# Patient Record
Sex: FEMALE | Race: WHITE | NOT HISPANIC OR LATINO | Employment: OTHER | URBAN - METROPOLITAN AREA
[De-identification: names, ages, dates, MRNs, and addresses within clinical notes are randomized per-mention and may not be internally consistent; named-entity substitution may affect disease eponyms.]

---

## 2017-06-22 ENCOUNTER — TRANSFERRED RECORDS (OUTPATIENT)
Dept: HEALTH INFORMATION MANAGEMENT | Facility: CLINIC | Age: 67
End: 2017-06-22

## 2018-06-28 ENCOUNTER — OFFICE VISIT (OUTPATIENT)
Dept: PEDIATRICS | Facility: CLINIC | Age: 68
End: 2018-06-28
Payer: COMMERCIAL

## 2018-06-28 ENCOUNTER — RADIANT APPOINTMENT (OUTPATIENT)
Dept: MAMMOGRAPHY | Facility: CLINIC | Age: 68
End: 2018-06-28
Attending: NURSE PRACTITIONER
Payer: COMMERCIAL

## 2018-06-28 VITALS
OXYGEN SATURATION: 97 % | WEIGHT: 108 LBS | TEMPERATURE: 97.5 F | HEART RATE: 68 BPM | SYSTOLIC BLOOD PRESSURE: 180 MMHG | DIASTOLIC BLOOD PRESSURE: 80 MMHG | HEIGHT: 56 IN | BODY MASS INDEX: 24.3 KG/M2

## 2018-06-28 DIAGNOSIS — Z12.31 ENCOUNTER FOR SCREENING MAMMOGRAM FOR BREAST CANCER: ICD-10-CM

## 2018-06-28 DIAGNOSIS — I10 HYPERTENSION WITH GOAL BLOOD PRESSURE LESS THAN 140/90: Primary | ICD-10-CM

## 2018-06-28 DIAGNOSIS — M81.0 OSTEOPOROSIS WITHOUT CURRENT PATHOLOGICAL FRACTURE, UNSPECIFIED OSTEOPOROSIS TYPE: ICD-10-CM

## 2018-06-28 DIAGNOSIS — E78.5 HYPERLIPIDEMIA WITH TARGET LDL LESS THAN 130: ICD-10-CM

## 2018-06-28 DIAGNOSIS — D50.9 IRON DEFICIENCY ANEMIA, UNSPECIFIED IRON DEFICIENCY ANEMIA TYPE: ICD-10-CM

## 2018-06-28 LAB
ALBUMIN SERPL-MCNC: 3.6 G/DL (ref 3.4–5)
ALP SERPL-CCNC: 78 U/L (ref 40–150)
ALT SERPL W P-5'-P-CCNC: 28 U/L (ref 0–50)
ANION GAP SERPL CALCULATED.3IONS-SCNC: 7 MMOL/L (ref 3–14)
AST SERPL W P-5'-P-CCNC: 23 U/L (ref 0–45)
BILIRUB SERPL-MCNC: 0.4 MG/DL (ref 0.2–1.3)
BUN SERPL-MCNC: 19 MG/DL (ref 7–30)
CALCIUM SERPL-MCNC: 8.7 MG/DL (ref 8.5–10.1)
CHLORIDE SERPL-SCNC: 106 MMOL/L (ref 94–109)
CHOLEST SERPL-MCNC: 208 MG/DL
CO2 SERPL-SCNC: 28 MMOL/L (ref 20–32)
CREAT SERPL-MCNC: 0.73 MG/DL (ref 0.52–1.04)
CREAT UR-MCNC: 88 MG/DL
ERYTHROCYTE [DISTWIDTH] IN BLOOD BY AUTOMATED COUNT: 12.4 % (ref 10–15)
FERRITIN SERPL-MCNC: 78 NG/ML (ref 8–252)
GFR SERPL CREATININE-BSD FRML MDRD: 79 ML/MIN/1.7M2
GLUCOSE SERPL-MCNC: 96 MG/DL (ref 70–99)
HCT VFR BLD AUTO: 39.4 % (ref 35–47)
HDLC SERPL-MCNC: 70 MG/DL
HGB BLD-MCNC: 12.6 G/DL (ref 11.7–15.7)
LDLC SERPL CALC-MCNC: 115 MG/DL
MCH RBC QN AUTO: 29.4 PG (ref 26.5–33)
MCHC RBC AUTO-ENTMCNC: 32 G/DL (ref 31.5–36.5)
MCV RBC AUTO: 92 FL (ref 78–100)
MICROALBUMIN UR-MCNC: 13 MG/L
MICROALBUMIN/CREAT UR: 14.94 MG/G CR (ref 0–25)
NONHDLC SERPL-MCNC: 138 MG/DL
PLATELET # BLD AUTO: 217 10E9/L (ref 150–450)
POTASSIUM SERPL-SCNC: 3.7 MMOL/L (ref 3.4–5.3)
PROT SERPL-MCNC: 7.7 G/DL (ref 6.8–8.8)
RBC # BLD AUTO: 4.28 10E12/L (ref 3.8–5.2)
SODIUM SERPL-SCNC: 141 MMOL/L (ref 133–144)
T4 FREE SERPL-MCNC: 0.96 NG/DL (ref 0.76–1.46)
TRIGL SERPL-MCNC: 113 MG/DL
TSH SERPL DL<=0.005 MIU/L-ACNC: 4.67 MU/L (ref 0.4–4)
WBC # BLD AUTO: 4.8 10E9/L (ref 4–11)

## 2018-06-28 PROCEDURE — 85027 COMPLETE CBC AUTOMATED: CPT | Performed by: NURSE PRACTITIONER

## 2018-06-28 PROCEDURE — 36415 COLL VENOUS BLD VENIPUNCTURE: CPT | Performed by: NURSE PRACTITIONER

## 2018-06-28 PROCEDURE — 82728 ASSAY OF FERRITIN: CPT | Performed by: NURSE PRACTITIONER

## 2018-06-28 PROCEDURE — 82043 UR ALBUMIN QUANTITATIVE: CPT | Performed by: NURSE PRACTITIONER

## 2018-06-28 PROCEDURE — 99204 OFFICE O/P NEW MOD 45 MIN: CPT | Performed by: NURSE PRACTITIONER

## 2018-06-28 PROCEDURE — 80061 LIPID PANEL: CPT | Performed by: NURSE PRACTITIONER

## 2018-06-28 PROCEDURE — 80053 COMPREHEN METABOLIC PANEL: CPT | Performed by: NURSE PRACTITIONER

## 2018-06-28 PROCEDURE — 84439 ASSAY OF FREE THYROXINE: CPT | Performed by: NURSE PRACTITIONER

## 2018-06-28 PROCEDURE — 77067 SCR MAMMO BI INCL CAD: CPT | Performed by: STUDENT IN AN ORGANIZED HEALTH CARE EDUCATION/TRAINING PROGRAM

## 2018-06-28 PROCEDURE — 84443 ASSAY THYROID STIM HORMONE: CPT | Performed by: NURSE PRACTITIONER

## 2018-06-28 RX ORDER — MOMETASONE FUROATE 1 MG/ML
SOLUTION TOPICAL
COMMUNITY
Start: 2015-06-02 | End: 2022-04-21

## 2018-06-28 RX ORDER — LISINOPRIL 10 MG/1
10 TABLET ORAL DAILY
Qty: 30 TABLET | Refills: 1 | Status: SHIPPED | OUTPATIENT
Start: 2018-06-28 | End: 2018-07-06

## 2018-06-28 RX ORDER — HYDROCHLOROTHIAZIDE 12.5 MG/1
CAPSULE ORAL
COMMUNITY
Start: 2007-09-28 | End: 2018-06-28

## 2018-06-28 RX ORDER — LYSINE HCL 500 MG
0.5 TABLET ORAL
COMMUNITY
Start: 2018-04-10 | End: 2018-11-09 | Stop reason: ALTCHOICE

## 2018-06-28 RX ORDER — LOVASTATIN 20 MG
20 TABLET ORAL AT BEDTIME
Qty: 90 TABLET | Refills: 1 | Status: SHIPPED | OUTPATIENT
Start: 2018-06-28 | End: 2018-07-09

## 2018-06-28 RX ORDER — LISINOPRIL 10 MG/1
10 TABLET ORAL DAILY
Qty: 30 TABLET | Refills: 1 | Status: SHIPPED | OUTPATIENT
Start: 2018-06-28 | End: 2018-06-28

## 2018-06-28 RX ORDER — LOVASTATIN 20 MG
20 TABLET ORAL
COMMUNITY
Start: 2018-03-29 | End: 2018-06-28

## 2018-06-28 NOTE — PROGRESS NOTES
SUBJECTIVE:   Bree Carrillo is a 67 year old female who presents to clinic today for the following health issues:    New Patient/Transfer of Care    Hyperlipidemia Follow-Up      Rate your low fat/cholesterol diet?: not monitoring fat    Taking statin?  Yes, no muscle aches from statin    Other lipid medications/supplements?:  none    Hypertension Follow-up      Outpatient blood pressures are not being checked. Patient has been off on BP medications for the last 2-3 years.    Low Salt Diet: low salt  Was on medications several years ago but has been off for several years       Amount of exercise or physical activity: house work    Problems taking medications regularly: No    Medication side effects: none    Diet: regular (no restrictions)      Problem list and histories reviewed & adjusted, as indicated.  Additional history: as documented    Patient Active Problem List   Diagnosis     Anemia     Closed right cuboid fracture     Falls     GERD (gastroesophageal reflux disease)     GI bleed     Hyperlipidemia     Hypertension     Iron deficiency anemia     Osteoporosis     Right foot pain     Vitamin D deficiency     No past surgical history on file.    Social History   Substance Use Topics     Smoking status: Never Smoker     Smokeless tobacco: Never Used     Alcohol use No     Family History   Problem Relation Age of Onset     No Known Problems Mother      No Known Problems Father      No Known Problems Maternal Grandmother      No Known Problems Maternal Grandfather      No Known Problems Paternal Grandmother      No Known Problems Paternal Grandfather      Coronary Artery Disease Brother      No Known Problems Sister      No Known Problems Son      No Known Problems Daughter      No Known Problems Maternal Half-Brother      No Known Problems Maternal Half-Sister      No Known Problems Paternal Half-Brother      No Known Problems Paternal Half-Sister      No Known Problems Niece      No Known Problems Nephew       No Known Problems Cousin      No Known Problems Other      Diabetes No family hx of      Hypertension No family hx of      Hyperlipidemia No family hx of      Cerebrovascular Disease No family hx of      Breast Cancer No family hx of      Colon Cancer No family hx of      Prostate Cancer No family hx of      Other Cancer No family hx of      Depression No family hx of      Anxiety Disorder No family hx of      Mental Illness No family hx of      Substance Abuse No family hx of      Anesthesia Reaction No family hx of      Asthma No family hx of      Osteoperosis No family hx of      Genetic Disorder No family hx of      Thyroid Disease No family hx of      Obesity No family hx of      Unknown/Adopted No family hx of          Current Outpatient Prescriptions   Medication Sig Dispense Refill     Calcium Carbonate-Vit D-Min (CALCIUM 600+D PLUS MINERALS) 600-400 MG-UNIT TABS Take 0.5 tablets by mouth       lovastatin (MEVACOR) 20 MG tablet Take 20 mg by mouth       denosumab (PROLIA) 60 MG/ML SOLN injection Inject 60 mg Subcutaneous       hydrochlorothiazide (MICROZIDE) 12.5 MG capsule        mometasone (ELOCON) 0.1 % solution (lotion)        Allergies   Allergen Reactions     Iohexol Hives     Pt experienced sneeze, itching followed by hives post 100cc Omni 350 for CT Scan.   Observed with IV in place x 45 minutes.   Given 25mg PO Benadryl with OK for 50mg to be taken every 8 hours for the next 24 hours per Dr Rausch.     JRS     BP Readings from Last 3 Encounters:   06/28/18 180/80    Wt Readings from Last 3 Encounters:   06/28/18 108 lb (49 kg)                  Labs reviewed in EPIC    Reviewed and updated as needed this visit by clinical staff  Tobacco  Allergies  Meds  Problems       Reviewed and updated as needed this visit by Provider  Problems         ROS:  CONSTITUTIONAL:NEGATIVE for fever, chills, change in weight  ENT/MOUTH: NEGATIVE for ear, mouth and throat problems  RESP:NEGATIVE for significant  "cough or SOB  CV: NEGATIVE for chest pain, palpitations or peripheral edema  GI: NEGATIVE for nausea, abdominal pain, heartburn, or change in bowel habits  MUSCULOSKELETAL: NEGATIVE for significant arthralgias or myalgia  NEURO: NEGATIVE for weakness, dizziness or paresthesias  ENDOCRINE: NEGATIVE for temperature intolerance, skin/hair changes  HEME/ALLERGY/IMMUNE: NEGATIVE for bleeding problems    OBJECTIVE:     /80 (BP Location: Right arm, Patient Position: Sitting, Cuff Size: Adult Regular)  Pulse 68  Temp 97.5  F (36.4  C) (Temporal)  Ht 4' 7.5\" (1.41 m)  Wt 108 lb (49 kg)  SpO2 97%  Breastfeeding? No  BMI 24.65 kg/m2  Body mass index is 24.65 kg/(m^2).   Wt Readings from Last 4 Encounters:   06/28/18 108 lb (49 kg)       GENERAL: Patient is well nourished, well developed, thin, frail,in no apparent distress, non-toxic, in no respiratory distress and acyanotic, alert, cooperative and well hydrated  EYES: Eyes grossly normal to inspection and conjunctivae and sclerae normal  HENT:ear canals and TM's normal and nose and mouth without ulcers or lesions   NECK:normal, supple and no adenopathy  CARDIAC:regular rates and rhythm, no murmur, click or rub and no irregular beats  without LE edema bilaterally  RESP: normal respiratory rate and rhythm, lungs clear to auscultation  unlabored respirations, no intercostal retractions or accessory muscle use  ABD:soft, nontender  SKIN: Skin color, texture, turgor normal. No rashes or lesions.  MS: extremities normal- no gross deformities noted, gait normal and normal muscle tone  NEURO: mentation intact and speech normal  PSYCH: Alert, oriented, thought content appropriate,mentation appears normal., affect and mood normal      Diagnostic Test Results:  Results for orders placed or performed in visit on 06/28/18 (from the past 24 hour(s))   Lipid panel reflex to direct LDL Fasting   Result Value Ref Range    Cholesterol 208 (H) <200 mg/dL    Triglycerides 113 <150 " mg/dL    HDL Cholesterol 70 >49 mg/dL    LDL Cholesterol Calculated 115 (H) <100 mg/dL    Non HDL Cholesterol 138 (H) <130 mg/dL   Comprehensive metabolic panel   Result Value Ref Range    Sodium 141 133 - 144 mmol/L    Potassium 3.7 3.4 - 5.3 mmol/L    Chloride 106 94 - 109 mmol/L    Carbon Dioxide 28 20 - 32 mmol/L    Anion Gap 7 3 - 14 mmol/L    Glucose 96 70 - 99 mg/dL    Urea Nitrogen 19 7 - 30 mg/dL    Creatinine 0.73 0.52 - 1.04 mg/dL    GFR Estimate 79 >60 mL/min/1.7m2    GFR Estimate If Black >90 >60 mL/min/1.7m2    Calcium 8.7 8.5 - 10.1 mg/dL    Bilirubin Total 0.4 0.2 - 1.3 mg/dL    Albumin 3.6 3.4 - 5.0 g/dL    Protein Total 7.7 6.8 - 8.8 g/dL    Alkaline Phosphatase 78 40 - 150 U/L    ALT 28 0 - 50 U/L    AST 23 0 - 45 U/L   TSH with free T4 reflex   Result Value Ref Range    TSH 4.67 (H) 0.40 - 4.00 mU/L   CBC with platelets   Result Value Ref Range    WBC 4.8 4.0 - 11.0 10e9/L    RBC Count 4.28 3.8 - 5.2 10e12/L    Hemoglobin 12.6 11.7 - 15.7 g/dL    Hematocrit 39.4 35.0 - 47.0 %    MCV 92 78 - 100 fl    MCH 29.4 26.5 - 33.0 pg    MCHC 32.0 31.5 - 36.5 g/dL    RDW 12.4 10.0 - 15.0 %    Platelet Count 217 150 - 450 10e9/L   Ferritin   Result Value Ref Range    Ferritin 78 8 - 252 ng/mL   T4 free   Result Value Ref Range    T4 Free 0.96 0.76 - 1.46 ng/dL   Albumin Random Urine Quantitative with Creat Ratio   Result Value Ref Range    Creatinine Urine 88 mg/dL    Albumin Urine mg/L 13 mg/L    Albumin Urine mg/g Cr 14.94 0 - 25 mg/g Cr       ASSESSMENT/PLAN:     Bree was seen today for establish care and lipids.    Diagnoses and all orders for this visit:    Hypertension with goal blood pressure less than 140/90  -     JUST IN CASE  -     Comprehensive metabolic panel  -     Albumin Random Urine Quantitative with Creat Ratio  -     Discontinue: lisinopril (PRINIVIL/ZESTRIL) 10 MG tablet; Take 1 tablet (10 mg) by mouth daily  -     lisinopril (PRINIVIL/ZESTRIL) 10 MG tablet; Take 1 tablet (10 mg) by  mouth daily  -     T4 free  HTN Plan:  1)  Medication: begin: lisinopril   2)  Dietary sodium restriction  3)  Regular aerobic exercise  4)  Recheck in 1 week, sooner should new symptoms or   problems arise.  5) See todays orders.    Patient Education: Reviewed risks of hypertension and principles of   treatment.      Hyperlipidemia with target LDL less than 130  -     lovastatin (MEVACOR) 20 MG tablet; Take 1 tablet (20 mg) by mouth At Bedtime  -     Lipid panel reflex to direct LDL Fasting  -     JUST IN CASE  -     TSH with free T4 reflex  Hyperlipidemia Plan:  Regular exercise and a low fat diet are encouraged.  A fasting lipid profile is obtained today.  Side effects of cholesterol medications discussed.  Patient is to follow-up in 12 months for a fasting lipid profile.    Iron deficiency anemia, unspecified iron deficiency anemia type  -     CBC with platelets  -     Ferritin  Will follow up and/or notify patient on results via phone or mail to determine further need for followup      Encounter for screening mammogram for breast cancer  -     MA Screening Digital Bilateral; Future    Osteoporosis without current pathological fracture, unspecified osteoporosis type  -     ENDOCRINOLOGY ADULT REFERRAL      PLAN:    Patient needs to follow up in if no improvement,or sooner if worsening of symptoms or other symptoms develop.  FURTHER TESTING:       - mammogram  CONSULTATION/REFERRAL to Endocrinology  Will follow up and/or notify patient on results via phone or mail to determine further need for followup  Schedule physical exam   Blood pressure check only in 1 week.   See Patient Instructions    AYANNA Bowser CNP  [facility]

## 2018-06-28 NOTE — NURSING NOTE
"Chief Complaint   Patient presents with     Establish Care     Lipids     fasting today       Initial /80 (BP Location: Right arm, Patient Position: Sitting, Cuff Size: Adult Regular)  Pulse 68  Temp 97.5  F (36.4  C) (Temporal)  Ht 4' 7.5\" (1.41 m)  Wt 108 lb (49 kg)  SpO2 97%  Breastfeeding? No  BMI 24.65 kg/m2 Estimated body mass index is 24.65 kg/(m^2) as calculated from the following:    Height as of this encounter: 4' 7.5\" (1.41 m).    Weight as of this encounter: 108 lb (49 kg).  Medication Reconciliation: complete      TG Kang      "

## 2018-06-28 NOTE — PATIENT INSTRUCTIONS
PLAN:   1.   Symptomatic therapy suggested: will start on lisinopril once a day.  2.  Orders Placed This Encounter   Medications     mometasone (ELOCON) 0.1 % solution (lotion)     denosumab (PROLIA) 60 MG/ML SOLN injection     Sig: Inject 60 mg Subcutaneous     DISCONTD: lovastatin (MEVACOR) 20 MG tablet     Sig: Take 20 mg by mouth     Calcium Carbonate-Vit D-Min (CALCIUM 600+D PLUS MINERALS) 600-400 MG-UNIT TABS     Sig: Take 0.5 tablets by mouth     DISCONTD: hydrochlorothiazide (MICROZIDE) 12.5 MG capsule     lovastatin (MEVACOR) 20 MG tablet     Sig: Take 1 tablet (20 mg) by mouth At Bedtime     Dispense:  90 tablet     Refill:  1     DISCONTD: lisinopril (PRINIVIL/ZESTRIL) 10 MG tablet     Sig: Take 1 tablet (10 mg) by mouth daily     Dispense:  30 tablet     Refill:  1     lisinopril (PRINIVIL/ZESTRIL) 10 MG tablet     Sig: Take 1 tablet (10 mg) by mouth daily     Dispense:  30 tablet     Refill:  1     Orders Placed This Encounter   Procedures     MA Screening Digital Bilateral     Lipid panel reflex to direct LDL Fasting     JUST IN CASE     Comprehensive metabolic panel     TSH with free T4 reflex     Albumin Random Urine Quantitative with Creat Ratio     CBC with platelets     Ferritin     ENDOCRINOLOGY ADULT REFERRAL       3. Patient needs to follow up in if no improvement,or sooner if worsening of symptoms or other symptoms develop.  FURTHER TESTING:       - mammogram  CONSULTATION/REFERRAL to Endocrinology  Will follow up and/or notify patient on results via phone or mail to determine further need for followup  Schedule physical exam   Blood pressure check only in 1 week.     It was a pleasure seeing you today at the [facility]. Thank you for allowing us to care for you today. We truly hope we provided you with the excellent service you deserve. Please let us know if there is anything else we can do for you so we can be sure you are leaving completley satisfied with  your care experience.       General information about your clinic   Clinic Hours Lab Hours (Appointments are required)   Mon-Thurs: 7:30 AM - 7 PM Mon-Thurs: 7:30 AM - 7 PM   Fri: 7:30 AM - 5 PM Fri: 7:30 AM - 5 PM        After Hours Nurse Advise & Appts:  [facility]: [phone]  [facility] On Call: to make appointments anytime: [phone] On Call Physician: call [phone] and answering service will page the on call physician.        For urgent appointments, please call [phone] and ask for the triage nurse or your care team clinic nurse.  How to contact my care team:  MyChart: [email or web address]   Phone: [phone]   Fax: [phone]       [facility]:   Phone: [phone]  Hours: 8:00 AM - 6:00 PM  Medication Refills:  Call your pharmacy and they will forward the refill to us. Please allow 3 business days for your refills to be completed.       Normal or non-critical lab and imaging results will be communicated to you by MyChart, letter or phone within 7 days.  If you do not hear from us within 10 days, please call the clinic. If you have a critical or abnormal lab result, we will notify you by phone as soon as possible.       We now have PWIC (Pediatric Walk in Care)  Monday-Friday from 7:30-4. Simply walk in and be seen for your urgent needs like cough, fever, rash, diarrhea or vomiting, pink eye, UTI. No appointments needed. Ask one of the team for more information      -Your Care Team:    Dr. Teresa Davis - Internal Medicine/Pediatrics   Dr. Tobias Carlos - Family Medicine  Dr. Minna Finley - Pediatrics  Dr. Paula Parada - Pediatrics  Reina Corley CNP - Family Practice Nurse Practitioner

## 2018-06-28 NOTE — MR AVS SNAPSHOT
After Visit Summary   6/28/2018    Bree Carrillo    MRN: [id number]           Patient Information     Date Of Birth          1950        Visit Information        Provider Department      6/28/2018 7:50 AM Reina Corley APRN CNP [facility]        Today's Diagnoses     Hypertension with goal blood pressure less than 140/90    -  1    Hyperlipidemia with target LDL less than 130        Iron deficiency anemia, unspecified iron deficiency anemia type        Encounter for screening mammogram for breast cancer        Osteoporosis without current pathological fracture, unspecified osteoporosis type          Care Instructions    PLAN:   1.   Symptomatic therapy suggested: will start on lisinopril once a day.  2.  Orders Placed This Encounter   Medications     mometasone (ELOCON) 0.1 % solution (lotion)     denosumab (PROLIA) 60 MG/ML SOLN injection     Sig: Inject 60 mg Subcutaneous     DISCONTD: lovastatin (MEVACOR) 20 MG tablet     Sig: Take 20 mg by mouth     Calcium Carbonate-Vit D-Min (CALCIUM 600+D PLUS MINERALS) 600-400 MG-UNIT TABS     Sig: Take 0.5 tablets by mouth     DISCONTD: hydrochlorothiazide (MICROZIDE) 12.5 MG capsule     lovastatin (MEVACOR) 20 MG tablet     Sig: Take 1 tablet (20 mg) by mouth At Bedtime     Dispense:  90 tablet     Refill:  1     DISCONTD: lisinopril (PRINIVIL/ZESTRIL) 10 MG tablet     Sig: Take 1 tablet (10 mg) by mouth daily     Dispense:  30 tablet     Refill:  1     lisinopril (PRINIVIL/ZESTRIL) 10 MG tablet     Sig: Take 1 tablet (10 mg) by mouth daily     Dispense:  30 tablet     Refill:  1     Orders Placed This Encounter   Procedures     MA Screening Digital Bilateral     Lipid panel reflex to direct LDL Fasting     JUST IN CASE     Comprehensive metabolic panel     TSH with free T4 reflex     Albumin Random Urine Quantitative with Creat Ratio     CBC with platelets     Ferritin     ENDOCRINOLOGY ADULT REFERRAL       3. Patient needs to  follow up in if no improvement,or sooner if worsening of symptoms or other symptoms develop.  FURTHER TESTING:       - mammogram  CONSULTATION/REFERRAL to Endocrinology  Will follow up and/or notify patient on results via phone or mail to determine further need for followup  Schedule physical exam   Blood pressure check only in 1 week.     It was a pleasure seeing you today at the [facility]. Thank you for allowing us to care for you today. We truly hope we provided you with the excellent service you deserve. Please let us know if there is anything else we can do for you so we can be sure you are leaving completley satisfied with your care experience.       General information about your clinic   Clinic Hours Lab Hours (Appointments are required)   Mon-Thurs: 7:30 AM - 7 PM Mon-Thurs: 7:30 AM - 7 PM   Fri: 7:30 AM - 5 PM Fri: 7:30 AM - 5 PM        After Hours Nurse Advise & Appts:  [facility] Advisors: [phone]  [facility] On Call: to make appointments anytime: [phone] On Call Physician: call [phone] and answering service will page the on call physician.        For urgent appointments, please call [phone] and ask for the triage nurse or your care team clinic nurse.  How to contact my care team:  MyChart: [email or web address]   Phone: [phone]   Fax: [phone]       [facility]:   Phone: [phone]  Hours: 8:00 AM - 6:00 PM  Medication Refills:  Call your pharmacy and they will forward the refill to us. Please allow 3 business days for your refills to be completed.       Normal or non-critical lab and imaging results will be communicated to you by MyChart, letter or phone within 7 days.  If you do not hear from us within 10 days, please call the clinic. If you have a critical or abnormal lab result, we will notify you by phone as soon as possible.       We now have PWIC (Pediatric Walk in Care)  Monday-Friday from 7:30-4. Simply walk in and be  seen for your urgent needs like cough, fever, rash, diarrhea or vomiting, pink eye, UTI. No appointments needed. Ask one of the team for more information      -Your Care Team:    Dr. Teresa Davis - Internal Medicine/Pediatrics   Dr. Tobias Carlos - Family Medicine  Dr. Minna Finley - Pediatrics  Dr. Paula Parada - Pediatrics  Reina Corley CNP - Family Practice Nurse Practitioner                         Follow-ups after your visit        Additional Services     ENDOCRINOLOGY ADULT REFERRAL       Your provider has referred you to: [facility]: [facility] [phone]   [email or web address]      Please be aware that coverage of these services is subject to the terms and limitations of your health insurance plan.  Call member services at your health plan with any benefit or coverage questions.      Please bring the following to your appointment:    >>   Any x-rays, CTs or MRIs which have been performed.  Contact the facility where they were done to arrange for  prior to your scheduled appointment.    >>   List of current medications   >>   This referral request   >>   Any documents/labs given to you for this referral                  Future tests that were ordered for you today     Open Future Orders        Priority Expected Expires Ordered    MA Screening Digital Bilateral Routine  6/28/2019 6/28/2018            Who to contact     If you have questions or need follow up information about today's clinic visit or your schedule please contact [facility] directly at [phone].  Normal or non-critical lab and imaging results will be communicated to you by MyChart, letter or phone within 4 business days after the clinic has received the results. If you do not hear from us within 7 days, please contact the clinic through MyChart or phone. If you have a critical or abnormal lab result, we will notify you by phone as soon  "as possible.  Submit refill requests through [employer] or call your pharmacy and they will forward the refill request to us. Please allow 3 business days for your refill to be completed.          Additional Information About Your Visit        Undo Software Information     [employer] is an electronic gateway that provides easy, online access to your medical records. With [employer], you can request a clinic appointment, read your test results, renew a prescription or communicate with your care team.     To sign up for [employer] visit the website at [email or web address] ([email or web address])/[email or web address] Like   You will be asked to enter the access code listed below, as well as some personal information. Please follow the directions to create your username and password.     Your access code is: [email or web address]  Expires: 2018  8:36 AM     Your access code will  in 90 days. If you need help or a new code, please contact your [facility] or call [phone] for assistance.        Care EveryWhere ID     This is your Care EveryWhere ID. This could be used by other organizations to access your [place] medical records  [id number]        Your Vitals Were     Pulse Temperature Height Pulse Oximetry Breastfeeding? BMI (Body Mass Index)    68 97.5  F (36.4  C) (Temporal) 4' 7.5\" (1.41 m) 97% No 24.65 kg/m2       Blood Pressure from Last 3 Encounters:   18 180/80    Weight from Last 3 Encounters:   18 108 lb (49 kg)              We Performed the Following     Albumin Random Urine Quantitative with Creat Ratio     CBC with platelets     Comprehensive metabolic panel     ENDOCRINOLOGY ADULT REFERRAL     Ferritin     JUST IN CASE     Lipid panel reflex to direct LDL Fasting     TSH with free T4 reflex          Today's Medication Changes          These changes are accurate as of 18  8:36 AM.  If you have any questions, ask your nurse or doctor.               Start taking these medicines.        Dose/Directions    lisinopril " 10 MG tablet   Commonly known as:  PRINIVIL/ZESTRIL   Used for:  Hypertension with goal blood pressure less than 140/90   Started by:  Reina Corley APRN CNP        Dose:  10 mg   Take 1 tablet (10 mg) by mouth daily   Quantity:  30 tablet   Refills:  1         These medicines have changed or have updated prescriptions.        Dose/Directions    lovastatin 20 MG tablet   Commonly known as:  MEVACOR   This may have changed:  when to take this   Used for:  Hyperlipidemia with target LDL less than 130   Changed by:  Reina Corley APRN CNP        Dose:  20 mg   Take 1 tablet (20 mg) by mouth At Bedtime   Quantity:  90 tablet   Refills:  1            Where to get your medicines      These medications were sent to [id number] - [place], [place] - [address]  [address], [facility] [address]     Phone:  [phone]     lisinopril 10 MG tablet         These medications were sent to [facility] - [facility] [address]  [address], [facility] [address]     Phone:  [phone]     lovastatin 20 MG tablet                Primary Care Provider Office Phone # Fax #    [facility] [phone]       [address]  [facility] [address]        Equal Access to Services     TAMIKO CHUNG AH: Hadii aad ku hadasho Soomaali, waaxda luqadaha, qaybta kaalmada adeegyada, waxay idiin hayaan josué khsherman strickland. So [facility] [phone].    ATENCIÓN: Si habla español, tiene a juarez disposición servicios gratuitos de asistencia lingüística. Llame al [phone].    We comply with applicable federal civil rights laws and [place] laws. We do not discriminate on the basis of race, color, national origin, age, disability, sex, sexual orientation, or gender identity.            Thank you!     Thank you for choosing [facility]  for your care. Our goal is always to provide you with excellent care. Hearing  back from our patients is one way we can continue to improve our services. Please take a few minutes to complete the written survey that you may receive in the mail after your visit with us. Thank you!             Your Updated Medication List - Protect others around you: Learn how to safely use, store and throw away your medicines at www.disposemymeds.org.          This list is accurate as of 6/28/18  8:36 AM.  Always use your most recent med list.                   Brand Name Dispense Instructions for use Diagnosis    CALCIUM 600+D PLUS MINERALS 600-400 MG-UNIT Tabs      Take 0.5 tablets by mouth        denosumab 60 MG/ML Soln injection    PROLIA     Inject 60 mg Subcutaneous        lisinopril 10 MG tablet    PRINIVIL/ZESTRIL    30 tablet    Take 1 tablet (10 mg) by mouth daily    Hypertension with goal blood pressure less than 140/90       lovastatin 20 MG tablet    MEVACOR    90 tablet    Take 1 tablet (20 mg) by mouth At Bedtime    Hyperlipidemia with target LDL less than 130       mometasone 0.1 % solution (lotion)    ELOCON

## 2018-06-29 ENCOUNTER — TELEPHONE (OUTPATIENT)
Dept: PEDIATRICS | Facility: CLINIC | Age: 68
End: 2018-06-29

## 2018-06-29 NOTE — TELEPHONE ENCOUNTER
Notes Recorded by Reina Corley, AYANNA CNP on 6/28/2018 at 11:52 PM  Please call   Labs are all in good range except thyroid slightly off will recheck at physical   Continue current medications.  Reina Corley, NP, APRN CNP    Ref Range & Units 1d ago        Creatinine Urine mg/dL 88     Albumin Urine mg/L mg/L 13     Albumin Urine mg/g Cr 0 - 25 mg/g Cr 14.94     Resulting Agency  [facility]          Specimen Collected: 06/28/18  9:34 AM Last Resulted: 06/28/18 10:04 AM         Ref Range & Units 1d ago        T4 Free 0.76 - 1.46 ng/dL 0.96     Resulting Agency  [facility]          Specimen Collected: 06/28/18  9:28 AM Last Resulted: 06/28/18 10:14 AM         Ref Range & Units 1d ago        TSH 0.40 - 4.00 mU/L 4.67 (H)     Resulting Agency  [facility]          Specimen Collected: 06/28/18  9:28 AM Last Resulted: 06/28/18 10:01 AM         Ref Range & Units 1d ago        Ferritin 8 - 252 ng/mL 78     Resulting Agency  [facility]          Specimen Collected: 06/28/18  9:28 AM Last Resulted: 06/28/18  9:56 AM         Ref Range & Units 1d ago        Cholesterol <200 mg/dL 208 (H)     Comments: Desirable:       <200 mg/dl     Triglycerides <150 mg/dL 113     Comments: Fasting specimen     HDL Cholesterol >49 mg/dL 70     LDL Cholesterol Calculated <100 mg/dL 115 (H)     Comments: Above desirable:  100-129 mg/dl   Borderline High:  130-159 mg/dL   High:             160-189 mg/dL   Very high:       >189 mg/dl        Non HDL Cholesterol <130 mg/dL 138 (H)     Comments: Above Desirable:  130-159 mg/dl   Borderline high:  160-189 mg/dl   High:             190-219 mg/dl   Very high:       >219 mg/dl        Resulting Agency  [facility]          Specimen Collected: 06/28/18  9:28 AM Last Resulted: 06/28/18  9:55 AM         Ref Range & Units 1d ago        Sodium 133 - 144 mmol/L 141     Potassium 3.4 - 5.3 mmol/L 3.7     Chloride 94 - 109 mmol/L 106     Carbon Dioxide 20 - 32 mmol/L 28     Anion Gap 3 - 14 mmol/L 7     Glucose 70 - 99 mg/dL 96      Comments: Fasting specimen     Urea Nitrogen 7 - 30 mg/dL 19     Creatinine 0.52 - 1.04 mg/dL 0.73     GFR Estimate >60 mL/min/1.7m2 79     Comments: Non  GFR Calc     GFR Estimate If Black >60 mL/min/1.7m2 >90     Comments:  GFR Calc     Calcium 8.5 - 10.1 mg/dL 8.7     Bilirubin Total 0.2 - 1.3 mg/dL 0.4     Albumin 3.4 - 5.0 g/dL 3.6     Protein Total 6.8 - 8.8 g/dL 7.7     Alkaline Phosphatase 40 - 150 U/L 78     ALT 0 - 50 U/L 28     AST 0 - 45 U/L 23     Resulting Agency  [facility]          Specimen Collected: 06/28/18  9:28 AM Last Resulted: 06/28/18  9:55 AM         Ref Range & Units 1d ago        WBC 4.0 - 11.0 10e9/L 4.8     RBC Count 3.8 - 5.2 10e12/L 4.28     Hemoglobin 11.7 - 15.7 g/dL 12.6     Hematocrit 35.0 - 47.0 % 39.4     MCV 78 - 100 fl 92     MCH 26.5 - 33.0 pg 29.4     MCHC 31.5 - 36.5 g/dL 32.0     RDW 10.0 - 15.0 % 12.4     Platelet Count 150 - 450 10e9/L 217     Resulting Agency  [facility]          Specimen Collected: 06/28/18  9:28 AM Last Resulted: 06/28/18  9:37 AM

## 2018-06-29 NOTE — TELEPHONE ENCOUNTER
Notified patient of lab result note below. Patient stated understanding and has scheduled annual physical with Reina Corley CNP on 07/23/18.     Appointment notes for 07/23/18 appointment have been updated to recheck TSH at that time.  Shadia Lunsford CMA

## 2018-07-02 ENCOUNTER — TELEPHONE (OUTPATIENT)
Dept: PEDIATRICS | Facility: CLINIC | Age: 68
End: 2018-07-02

## 2018-07-02 NOTE — TELEPHONE ENCOUNTER
[facility]    Phone Message    May a detailed message be left on voicemail: yes    Reason for Call: Patient called wanting to know the results of her Mammogram she had done on 6/28/18. Patient requesting a call back to go over those results. Thank you    Action Taken: Message routed to:  Primary Care p [phone]

## 2018-07-03 ENCOUNTER — TELEPHONE (OUTPATIENT)
Dept: PEDIATRICS | Facility: CLINIC | Age: 68
End: 2018-07-03

## 2018-07-03 DIAGNOSIS — I10 HYPERTENSION WITH GOAL BLOOD PRESSURE LESS THAN 140/90: Primary | ICD-10-CM

## 2018-07-03 NOTE — TELEPHONE ENCOUNTER
Received fax from pharmacy in regards to new rx for lisinopril 10 mg tab. Patient reports lisinopril tab 10 mg tab allergy and there is a potential for cross-sensitivity. Please confirm if okay to fill for this medication? Yes or no? Please advise    TG Kang

## 2018-07-03 NOTE — TELEPHONE ENCOUNTER
Recent Results (from the past 744 hour(s))   MA Screening Digital Bilateral    Narrative    Examination: Bilateral digital screening mammography with computer  aided detection.    Comparison: 6/22/2017, 6/22/2016, 6/17/2015    History/Family history: No symptoms, routine screening.    BREAST DENSITY: Heterogeneously dense.    COMMENTS: No significant change.      Impression    IMPRESSION: BI-RADS CATEGORY: 1 -  NEGATIVE.    RECOMMENDED FOLLOW-UP: Annual Mammography    Results to be sent to the patient.     LORIE LACEY MD     Mammogram is normal

## 2018-07-04 NOTE — TELEPHONE ENCOUNTER
Can we call and validate this and what type of issue she had with lisinopril as she said nothing of it at the visit   If just a cough we could use losartan

## 2018-07-05 ENCOUNTER — ALLIED HEALTH/NURSE VISIT (OUTPATIENT)
Dept: PEDIATRICS | Facility: CLINIC | Age: 68
End: 2018-07-05
Payer: COMMERCIAL

## 2018-07-05 VITALS — DIASTOLIC BLOOD PRESSURE: 72 MMHG | OXYGEN SATURATION: 97 % | HEART RATE: 65 BPM | SYSTOLIC BLOOD PRESSURE: 148 MMHG

## 2018-07-05 DIAGNOSIS — I10 HYPERTENSION WITH GOAL BLOOD PRESSURE LESS THAN 140/90: Primary | ICD-10-CM

## 2018-07-05 PROCEDURE — 99207 ZZC NO CHARGE NURSE ONLY: CPT

## 2018-07-05 NOTE — PROGRESS NOTES
Subjective:   Bree Carrillo is a 67 year old female with hypertension.    Current Outpatient Prescriptions   Medication Sig Dispense Refill     Calcium Carbonate-Vit D-Min (CALCIUM 600+D PLUS MINERALS) 600-400 MG-UNIT TABS Take 0.5 tablets by mouth       denosumab (PROLIA) 60 MG/ML SOLN injection Inject 60 mg Subcutaneous       lisinopril (PRINIVIL/ZESTRIL) 10 MG tablet Take 1 tablet (10 mg) by mouth daily 30 tablet 1     lovastatin (MEVACOR) 20 MG tablet Take 1 tablet (20 mg) by mouth At Bedtime 90 tablet 1     mometasone (ELOCON) 0.1 % solution (lotion)           Hypertension ROS: taking medications as instructed (patient states she takes  her lisinopril 10 mg every morning as directed) , no medication side effects noted, patient does not perform home BP monitoring,  Patient does have a home BP monitor but she states she does not know how to use it so she will bring it in to her next appointment and I advised her we will help show her how to use it and compare BP readings.  Patient stated understanding.  No TIA's, no chest pain on exertion, no dyspnea on exertion, no swelling of ankles.     New concerns: No.       /72 (BP Location: Right arm, Patient Position: Sitting, Cuff Size: Adult Regular)  Pulse 65  SpO2 97%     Misty Rausch CMA    Message routed to Senait Corley/provider nilo

## 2018-07-05 NOTE — MR AVS SNAPSHOT
After Visit Summary   7/5/2018    Bree Carrillo    MRN: [id number]           Patient Information     Date Of Birth          1950        Visit Information        Provider Department      7/5/2018 11:00 AM [facility]        Today's Diagnoses     Hypertension with goal blood pressure less than 140/90    -  1       Follow-ups after your visit        Your next 10 appointments already scheduled     Jul 23, 2018 10:10 AM CDT   PHYSICAL with AYANNA Bowser CNP   [facility])    [address] [place] [address]   [phone]            Aug 14, 2018  9:00 AM CDT   New Visit with Kenny Arcos MD, [facility] NURSE ONLY   [facility])    [address] [place] [id number] [phone]            Nov 09, 2018  2:15 PM CST   New Visit with Deanna Lundberg MD   [facility])    [address] [place] [id number] [phone]              Who to contact     If you have questions or need follow up information about today's clinic visit or your schedule please contact [facility] directly at [phone].  Normal or non-critical lab and imaging results will be communicated to you by Lumifichart, letter or phone within 4 business days after the clinic has received the results. If you do not hear from us within 7 days, please contact the clinic through MyChart or phone. If you have a critical or abnormal lab result, we will notify you by phone as soon as possible.  Submit refill requests through [employer] or call your pharmacy and they will forward the refill request to us. Please allow 3 business days for your refill to be completed.          Additional Information About Your Visit        LumificharCicer[employer]s Information     [employer] is an electronic gateway that provides easy, online access  to your medical records. With [employer], you can request a clinic appointment, read your test results, renew a prescription or communicate with your care team.     To sign up for [employer] visit the website at [email or web address]   You will be asked to enter the access code listed below, as well as some personal information. Please follow the directions to create your username and password.     Your access code is: [email or web address]  Expires: 2018  8:36 AM     Your access code will  in 90 days. If you need help or a new code, please contact your [facility] or call [phone] for assistance.        Care EveryWhere ID     This is your Care EveryWhere ID. This could be used by other organizations to access your [place] medical records  [id number]        Your Vitals Were     Pulse Pulse Oximetry                65 97%           Blood Pressure from Last 3 Encounters:   18 148/72   18 180/80    Weight from Last 3 Encounters:   18 108 lb (49 kg)              Today, you had the following     No orders found for display       Primary Care Provider Office Phone # Fax #    [facility] [phone]       [address]  [facility] [address]        Equal Access to Services     TAMIKO CHUNG : Hadii aad ku hadasho Soomaali, waaxda luqadaha, qaybta kaalmada adeegyada, waxay idiin hayaan adeeg kharaerin la'karlee strickland. So [facility] [phone].    ATENCIÓN: Si habla español, tiene a juarez disposición servicios gratuitos de asistencia lingüística. Llame al [phone].    We comply with applicable federal civil rights laws and [place] laws. We do not discriminate on the basis of race, color, national origin, age, disability, sex, sexual orientation, or gender identity.            Thank you!     Thank you for choosing [facility]  for your care. Our goal is always to provide you with excellent care. Hearing back from our patients  is one way we can continue to improve our services. Please take a few minutes to complete the written survey that you may receive in the mail after your visit with us. Thank you!             Your Updated Medication List - Protect others around you: Learn how to safely use, store and throw away your medicines at www.disposemymeds.org.          This list is accurate as of 7/5/18 11:02 AM.  Always use your most recent med list.                   Brand Name Dispense Instructions for use Diagnosis    CALCIUM 600+D PLUS MINERALS 600-400 MG-UNIT Tabs      Take 0.5 tablets by mouth        denosumab 60 MG/ML Soln injection    PROLIA     Inject 60 mg Subcutaneous        lisinopril 10 MG tablet    PRINIVIL/ZESTRIL    30 tablet    Take 1 tablet (10 mg) by mouth daily    Hypertension with goal blood pressure less than 140/90       lovastatin 20 MG tablet    MEVACOR    90 tablet    Take 1 tablet (20 mg) by mouth At Bedtime    Hyperlipidemia with target LDL less than 130       mometasone 0.1 % solution (lotion)    ELOCON

## 2018-07-05 NOTE — PROGRESS NOTES
Patient advised of information below per Senait Corley.  Patient states she has enough Lisinopril 10 mg to take (2) 10 mg pills in the am and that will get her through until she comes in for an ancillary BP check in 1-2 weeks and then she will ask for a refill.  Patient wants to make sure the lisinopril works before getting a refill.  Patient stated understanding.    Misty Rausch CMA

## 2018-07-05 NOTE — NURSING NOTE
Bree Gerardo comes into clinic today at the request of Reina Corley Ordering Provider for BP Check: BP at visit: 148/72.        This service provided today was under the supervising provider of the day Dr. Teresa Davis, who was available if needed.    Misty Rausch

## 2018-07-05 NOTE — Clinical Note
/72 (BP Location: Right arm, Patient Position: Sitting, Cuff Size: Adult Regular)  Pulse 65  SpO2 97%  Misty Rausch CMA

## 2018-07-06 RX ORDER — LISINOPRIL 20 MG/1
20 TABLET ORAL DAILY
Qty: 30 TABLET | Refills: 1 | Status: SHIPPED | OUTPATIENT
Start: 2018-07-06 | End: 2018-07-09

## 2018-07-06 NOTE — TELEPHONE ENCOUNTER
Patient advised of information below per Senait Corley.  Patient states she does have a slight cough during the day/night with the lisinopril but she states it is not bothering her and she would like to stay on it and then discuss it with Senait Corley at her appt on 07/23/18.    Patient was seen for a BP check on 07/03/18 and was advised to increase her Lisinopril to 20 mg daily and recheck BP in 1-2 weeks.  Patient is currently taking (2) 10 mg tablets but states she will not have enough to get her through until her appt with Senait Corley on 07/23/18.  She would like a new script for Lisinopril 20 mg sent to [facility].    Please call patient when script has been sent to [facility] pharmacy.    Misty Rausch [facility]    Message routed to Senait Corley

## 2018-07-06 NOTE — TELEPHONE ENCOUNTER
Patient advised Lisinopril 20 mg script was sent to [facility] pharmacy MG per Senait Corley.  Patient states she still has Lisinopril 10 mg left, about 15 days.  I advised patient she can take two 10 mg tablets every am until they run out and then when she picks up the new script I reminded her that it will be Lisinopril 20 mg tablets so she will only need to take one 20 mg tablet daily.  Patient stated understanding.      Misty Rausch CMA

## 2018-07-06 NOTE — TELEPHONE ENCOUNTER
[facility] Call Center    Phone Message    May a detailed message be left on voicemail: yes    Reason for Call: Other: Patient is calling about her lisinopril (PRINIVIL/ZESTRIL) 10 MG tablet [[id number]] (Order [id number])  and want Rx sent to [facility]. Please advise.  [id number] - [place], [place] - [address]    Action Taken: Message routed to:  Primary Care p [phone]

## 2018-07-09 DIAGNOSIS — E78.5 HYPERLIPIDEMIA WITH TARGET LDL LESS THAN 130: ICD-10-CM

## 2018-07-09 DIAGNOSIS — I10 HYPERTENSION WITH GOAL BLOOD PRESSURE LESS THAN 140/90: ICD-10-CM

## 2018-07-09 NOTE — TELEPHONE ENCOUNTER
Patient called wanting the refills sent to [facility] and refills/scripts to be faxed to [phone]. Thank you

## 2018-07-10 RX ORDER — LOVASTATIN 20 MG
20 TABLET ORAL AT BEDTIME
Qty: 90 TABLET | Refills: 0 | Status: SHIPPED | OUTPATIENT
Start: 2018-07-10 | End: 2018-07-23

## 2018-07-10 RX ORDER — LISINOPRIL 20 MG/1
20 TABLET ORAL DAILY
Qty: 90 TABLET | Refills: 0 | Status: SHIPPED | OUTPATIENT
Start: 2018-07-10 | End: 2018-07-23 | Stop reason: SINTOL

## 2018-07-10 NOTE — TELEPHONE ENCOUNTER
lisinopril (PRINIVIL/ZESTRIL) 20 MG tablet 30 tablet 1 7/6/2018  No   Sig - Route: Take 1 tablet (20 mg) by mouth daily - Oral     Last OV with Reina Corley CNP: 6/28/2018    Next 5 appointments (look out 90 days)     Jul 23, 2018 10:10 AM CDT   PHYSICAL with AYNANA Bowser CNP   [facility]    [address] [place] [address] [phone]                BP Readings from Last 3 Encounters:   07/05/18 148/72   06/28/18 180/80     Creatinine   Date Value Ref Range Status   06/28/2018 0.73 0.52 - 1.04 mg/dL Final     Potassium   Date Value Ref Range Status   06/28/2018 3.7 3.4 - 5.3 mmol/L Final     ACE Inhibitors (Including Combos) Protocol Failed7/10 9:55 AM   Blood pressure under 140/90 in past 12 months    Recent (12 mo) or future (30 days) visit within the authorizing provider's specialty    Patient is age 18 or older    No active pregnancy on record    Normal serum creatinine on file in past 12 months    Normal serum potassium on file in past 12 months    No positive pregnancy test in past 12 months     lovastatin (MEVACOR) 20 MG tablet 90 tablet 1 6/28/2018  No   Sig - Route: Take 1 tablet (20 mg) by mouth At Bedtime - Oral   Class: E-Prescribe     LDL Cholesterol Calculated   Date Value Ref Range Status   06/28/2018 115 (H) <100 mg/dL Final     Comment:     Above desirable:  100-129 mg/dl  Borderline High:  130-159 mg/dL  High:             160-189 mg/dL  Very high:       >189 mg/dl       Statins Protocol Passed7/10 9:55 AM   LDL on file in past 12 months    No abnormal creatine kinase in past 12 months    Recent (12 mo) or future (30 days) visit within the authorizing provider's specialty    Patient is age 18 or older    No active pregnancy on record    No positive pregnancy test in past 12 months     Refills forwarded to [facility] pharmacy per patient request.     Pau Fountain RN

## 2018-07-23 ENCOUNTER — OFFICE VISIT (OUTPATIENT)
Dept: PEDIATRICS | Facility: CLINIC | Age: 68
End: 2018-07-23
Payer: COMMERCIAL

## 2018-07-23 VITALS
BODY MASS INDEX: 24.32 KG/M2 | OXYGEN SATURATION: 100 % | SYSTOLIC BLOOD PRESSURE: 140 MMHG | DIASTOLIC BLOOD PRESSURE: 70 MMHG | TEMPERATURE: 97.7 F | WEIGHT: 108.1 LBS | HEIGHT: 56 IN | HEART RATE: 66 BPM

## 2018-07-23 DIAGNOSIS — I10 HYPERTENSION WITH GOAL BLOOD PRESSURE LESS THAN 140/90: ICD-10-CM

## 2018-07-23 DIAGNOSIS — Z11.59 NEED FOR HEPATITIS C SCREENING TEST: ICD-10-CM

## 2018-07-23 DIAGNOSIS — M81.0 OSTEOPOROSIS WITHOUT CURRENT PATHOLOGICAL FRACTURE, UNSPECIFIED OSTEOPOROSIS TYPE: ICD-10-CM

## 2018-07-23 DIAGNOSIS — E78.5 HYPERLIPIDEMIA LDL GOAL <130: Primary | ICD-10-CM

## 2018-07-23 DIAGNOSIS — Z00.00 ENCOUNTER FOR ROUTINE ADULT HEALTH EXAMINATION WITHOUT ABNORMAL FINDINGS: Primary | ICD-10-CM

## 2018-07-23 DIAGNOSIS — E78.5 HYPERLIPIDEMIA WITH TARGET LDL LESS THAN 130: ICD-10-CM

## 2018-07-23 DIAGNOSIS — D50.9 IRON DEFICIENCY ANEMIA, UNSPECIFIED IRON DEFICIENCY ANEMIA TYPE: ICD-10-CM

## 2018-07-23 DIAGNOSIS — E55.9 VITAMIN D DEFICIENCY: ICD-10-CM

## 2018-07-23 LAB
ALBUMIN SERPL-MCNC: 3.5 G/DL (ref 3.4–5)
ALP SERPL-CCNC: 77 U/L (ref 40–150)
ALT SERPL W P-5'-P-CCNC: 31 U/L (ref 0–50)
ANION GAP SERPL CALCULATED.3IONS-SCNC: 5 MMOL/L (ref 3–14)
AST SERPL W P-5'-P-CCNC: 24 U/L (ref 0–45)
BILIRUB SERPL-MCNC: 0.4 MG/DL (ref 0.2–1.3)
BUN SERPL-MCNC: 20 MG/DL (ref 7–30)
CALCIUM SERPL-MCNC: 8.3 MG/DL (ref 8.5–10.1)
CHLORIDE SERPL-SCNC: 107 MMOL/L (ref 94–109)
CHOLEST SERPL-MCNC: 185 MG/DL
CO2 SERPL-SCNC: 29 MMOL/L (ref 20–32)
CREAT SERPL-MCNC: 0.7 MG/DL (ref 0.52–1.04)
CREAT UR-MCNC: 124 MG/DL
ERYTHROCYTE [DISTWIDTH] IN BLOOD BY AUTOMATED COUNT: 12.8 % (ref 10–15)
GFR SERPL CREATININE-BSD FRML MDRD: 83 ML/MIN/1.7M2
GLUCOSE SERPL-MCNC: 90 MG/DL (ref 70–99)
HCT VFR BLD AUTO: 37.5 % (ref 35–47)
HDLC SERPL-MCNC: 78 MG/DL
HGB BLD-MCNC: 12.2 G/DL (ref 11.7–15.7)
LDLC SERPL CALC-MCNC: 92 MG/DL
MCH RBC QN AUTO: 29.8 PG (ref 26.5–33)
MCHC RBC AUTO-ENTMCNC: 32.5 G/DL (ref 31.5–36.5)
MCV RBC AUTO: 92 FL (ref 78–100)
MICROALBUMIN UR-MCNC: 12 MG/L
MICROALBUMIN/CREAT UR: 10 MG/G CR (ref 0–25)
NONHDLC SERPL-MCNC: 107 MG/DL
PLATELET # BLD AUTO: 225 10E9/L (ref 150–450)
POTASSIUM SERPL-SCNC: 4 MMOL/L (ref 3.4–5.3)
PROT SERPL-MCNC: 7.5 G/DL (ref 6.8–8.8)
RBC # BLD AUTO: 4.1 10E12/L (ref 3.8–5.2)
SODIUM SERPL-SCNC: 141 MMOL/L (ref 133–144)
TRIGL SERPL-MCNC: 76 MG/DL
TSH SERPL DL<=0.005 MIU/L-ACNC: 2.35 MU/L (ref 0.4–4)
WBC # BLD AUTO: 4.3 10E9/L (ref 4–11)

## 2018-07-23 PROCEDURE — G0439 PPPS, SUBSEQ VISIT: HCPCS | Performed by: NURSE PRACTITIONER

## 2018-07-23 PROCEDURE — 80053 COMPREHEN METABOLIC PANEL: CPT | Performed by: NURSE PRACTITIONER

## 2018-07-23 PROCEDURE — 82043 UR ALBUMIN QUANTITATIVE: CPT | Performed by: NURSE PRACTITIONER

## 2018-07-23 PROCEDURE — 85027 COMPLETE CBC AUTOMATED: CPT | Performed by: NURSE PRACTITIONER

## 2018-07-23 PROCEDURE — 80061 LIPID PANEL: CPT | Performed by: NURSE PRACTITIONER

## 2018-07-23 PROCEDURE — 86803 HEPATITIS C AB TEST: CPT | Performed by: NURSE PRACTITIONER

## 2018-07-23 PROCEDURE — 82306 VITAMIN D 25 HYDROXY: CPT | Performed by: NURSE PRACTITIONER

## 2018-07-23 PROCEDURE — 36415 COLL VENOUS BLD VENIPUNCTURE: CPT | Performed by: NURSE PRACTITIONER

## 2018-07-23 PROCEDURE — 84443 ASSAY THYROID STIM HORMONE: CPT | Performed by: NURSE PRACTITIONER

## 2018-07-23 RX ORDER — LOSARTAN POTASSIUM 25 MG/1
25 TABLET ORAL DAILY
Qty: 30 TABLET | Refills: 1 | Status: SHIPPED | OUTPATIENT
Start: 2018-07-23 | End: 2018-08-06

## 2018-07-23 RX ORDER — LOVASTATIN 20 MG
20 TABLET ORAL AT BEDTIME
Qty: 90 TABLET | Refills: 3 | Status: SHIPPED | OUTPATIENT
Start: 2018-07-23 | End: 2018-10-11

## 2018-07-23 NOTE — MR AVS SNAPSHOT
After Visit Summary   7/23/2018    Bree Carrillo    MRN: [id number]           Patient Information     Date Of Birth          1950        Visit Information        Provider Department      7/23/2018 10:10 AM Reina Corley APRN CNP [facility]        Today's Diagnoses     Encounter for routine adult health examination without abnormal findings    -  1    Iron deficiency anemia, unspecified iron deficiency anemia type        Vitamin D deficiency        Hypertension with goal blood pressure less than 140/90        Hyperlipidemia with target LDL less than 130        Need for hepatitis C screening test        Osteoporosis without current pathological fracture, unspecified osteoporosis type          Care Instructions    PLAN:   1.   Symptomatic therapy suggested: stop lisinopril and start losartan.    2.  Orders Placed This Encounter   Medications     Multiple Vitamins-Minerals (ALIVE WOMENS GUMMY PO)     losartan (COZAAR) 25 MG tablet     Sig: Take 1 tablet (25 mg) by mouth daily     Dispense:  30 tablet     Refill:  1     lovastatin (MEVACOR) 20 MG tablet     Sig: Take 1 tablet (20 mg) by mouth At Bedtime     Dispense:  90 tablet     Refill:  3     Orders Placed This Encounter   Procedures     Lipid panel reflex to direct LDL Fasting     Comprehensive metabolic panel     TSH with free T4 reflex     Vitamin D Deficiency     JUST IN CASE     Albumin Random Urine Quantitative with Creat Ratio     CBC with platelets     **Hepatitis C Screen Reflex to RNA FUTURE anytime     ENDOCRINOLOGY ADULT REFERRAL       3. Patient needs to follow up in if no improvement,or sooner if worsening of symptoms or other symptoms develop.  Will follow up and/or notify patient on results via phone or mail to determine further need for followup  Referral to endocrinology   Follow up nurse visit in 2 weeks for blood pressure check.  Follow up office visit in one year for annual health maintenance exam,  sooner PRN.    Preventive Health Recommendations  Female Ages 65 +    Yearly exam:     See your health care provider every year in order to  o Review health changes.   o Discuss preventive care.    o Review your medicines if your doctor has prescribed any.      You no longer need a yearly Pap test unless you've had an abnormal Pap test in the past 10 years. If you have vaginal symptoms, such as bleeding or discharge, be sure to talk with your provider about a Pap test.      Every 1 to 2 years, have a mammogram.  If you are over 69, talk with your health care provider about whether or not you want to continue having screening mammograms.      Every 10 years, have a colonoscopy. Or, have a yearly FIT test (stool test). These exams will check for colon cancer.       Have a cholesterol test every 5 years, or more often if your doctor advises it.       Have a diabetes test (fasting glucose) every three years. If you are at risk for diabetes, you should have this test more often.       At age 65, have a bone density scan (DEXA) to check for osteoporosis (brittle bone disease).    Shots:    Get a flu shot each year.    Get a tetanus shot every 10 years.    Talk to your doctor about your pneumonia vaccines. There are now two you should receive - Pneumovax (PPSV 23) and Prevnar (PCV 13).    Talk to your pharmacist about the shingles vaccine.    Talk to your doctor about the hepatitis B vaccine.    Nutrition:     Eat at least 5 servings of fruits and vegetables each day.      Eat whole-grain bread, whole-wheat pasta and brown rice instead of white grains and rice.      Get adequate about Calcium and Vitamin D.     Lifestyle    Exercise at least 150 minutes a week (30 minutes a day, 5 days a week). This will help you control your weight and prevent disease.      Limit alcohol to one drink per day.      No smoking.       Wear sunscreen to prevent skin cancer.       See your dentist twice a year for an exam and  cleaning.      See your eye doctor every 1 to 2 years to screen for conditions such as glaucoma, macular degeneration, cataracts, etc   It was a pleasure seeing you today at the [facility] - Primary [facility]. Thank you for allowing us to care for you today. We truly hope we provided you with the excellent service you deserve. Please let us know if there is anything else we can do for you so we can be sure you are leaving completley satisfied with your care experience.       General information about your clinic   Clinic Hours Lab Hours (Appointments are required)   Mon-Thurs: 7:30 AM - 7 PM Mon-Thurs: 7:30 AM - 7 PM   Fri: 7:30 AM - 5 PM Fri: 7:30 AM - 5 PM        After Hours Nurse Advise & Appts:  [facility] Nurse Advisors: [phone]  [facility] On Call: to make appointments anytime: [phone] On Call Physician: call [phone] and answering service will page the on call physician.        For urgent appointments, please call [phone] and ask for the triage nurse or your care team clinic nurse.  How to contact my care team:  MyChart: [email or web address]   Phone: [phone]   Fax: [phone]       [facility]:   Phone: [phone]  Hours: 8:00 AM - 6:00 PM  Medication Refills:  Call your pharmacy and they will forward the refill to us. Please allow 3 business days for your refills to be completed.       Normal or non-critical lab and imaging results will be communicated to you by MyChart, letter or phone within 7 days.  If you do not hear from us within 10 days, please call the clinic. If you have a critical or abnormal lab result, we will notify you by phone as soon as possible.       We now have PWIC (Pediatric Walk in Care)  Monday-Friday from 7:30-4. Simply walk in and be seen for your urgent needs like cough, fever, rash, diarrhea or vomiting, pink eye, UTI. No appointments needed. Ask one of the team for more information      -Your Care Team:    Dr. Teresa Davis - Internal  Medicine/Pediatrics   Dr. Tobias Carlos - Family Medicine  Dr. Minna Finley - Pediatrics  Dr. Paula Parada - Pediatrics  Reina Corley CNP - Family Practice Nurse Practitioner                         Follow-ups after your visit        Additional Services     ENDOCRINOLOGY ADULT REFERRAL       Your provider has referred you to: [facility]: [place] [phone]   [email or web address]      Please be aware that coverage of these services is subject to the terms and limitations of your health insurance plan.  Call member services at your health plan with any benefit or coverage questions.      Please bring the following to your appointment:    >>   Any x-rays, CTs or MRIs which have been performed.  Contact the facility where they were done to arrange for  prior to your scheduled appointment.    >>   List of current medications   >>   This referral request   >>   Any documents/labs given to you for this referral                  Your next 10 appointments already scheduled     Jul 23, 2018 10:50 AM CDT   LAB with LAB FIRST FLOOR [facility])    [address] [place] [address] [phone]           Please do not eat 10-12 hours before your appointment if you are coming in fasting for labs on lipids, cholesterol, or glucose (sugar). This does not apply to pregnant women. Water, hot tea and black coffee (with nothing added) are okay. Do not drink other fluids, diet soda or chew gum.            Aug 14, 2018  9:00 AM CDT   New Visit with Kenny Arcos MD, [facility] NURSE ONLY   [facility])    [address] [place] [id number] [phone]            Nov 09, 2018  2:15 PM CST   New Visit with Deanna Lundberg MD   [facility])    [address] [place] [address]  "[place]  [facility] [id number] [phone]              Who to contact     If you have questions or need follow up information about today's clinic visit or your schedule please contact [facility] directly at [phone].  Normal or non-critical lab and imaging results will be communicated to you by MyChart, letter or phone within 4 business days after the clinic has received the results. If you do not hear from us within 7 days, please contact the clinic through MyChart or phone. If you have a critical or abnormal lab result, we will notify you by phone as soon as possible.  Submit refill requests through [employer] or call your pharmacy and they will forward the refill request to us. Please allow 3 business days for your refill to be completed.          Additional Information About Your Visit        [employer] Information     [employer] is an electronic gateway that provides easy, online access to your medical records. With [employer], you can request a clinic appointment, read your test results, renew a prescription or communicate with your care team.     To sign up for [employer] visit the website at [email or web address]   You will be asked to enter the access code listed below, as well as some personal information. Please follow the directions to create your username and password.     Your access code is: [email or web address]  Expires: 2018  8:36 AM     Your access code will  in 90 days. If you need help or a new code, please contact your [facility] or call [phone] for assistance.        Care EveryWhere ID     This is your Care EveryWhere ID. This could be used by other organizations to access your [facility] medical records  [id number]        Your Vitals Were     Pulse Temperature Height Pulse Oximetry Breastfeeding? BMI (Body Mass Index)    66 97.7  F (36.5  C) (Temporal) 4' 7.5\" (1.41 m) 100% No 24.67 kg/m2       Blood Pressure from Last 3 Encounters: "   07/23/18 140/70   07/05/18 148/72   06/28/18 180/80    Weight from Last 3 Encounters:   07/23/18 108 lb 1.6 oz (49 kg)   06/28/18 108 lb (49 kg)              We Performed the Following     **Hepatitis C Screen Reflex to RNA FUTURE anytime     Albumin Random Urine Quantitative with Creat Ratio     CBC with platelets     Comprehensive metabolic panel     ENDOCRINOLOGY ADULT REFERRAL     JUST IN CASE     Lipid panel reflex to direct LDL Fasting     TSH with free T4 reflex     Vitamin D Deficiency          Today's Medication Changes          These changes are accurate as of 7/23/18 10:48 AM.  If you have any questions, ask your nurse or doctor.               Start taking these medicines.        Dose/Directions    losartan 25 MG tablet   Commonly known as:  COZAAR   Used for:  Hyperlipidemia with target LDL less than 130   Started by:  Reina Corley APRN CNP        Dose:  25 mg   Take 1 tablet (25 mg) by mouth daily   Quantity:  30 tablet   Refills:  1         Stop taking these medicines if you haven't already. Please contact your care team if you have questions.     lisinopril 20 MG tablet   Commonly known as:  PRINIVIL/ZESTRIL   Stopped by:  Reina Corley APRN CNP                Where to get your medicines      These medications were sent to [id number] - [place], [place] - [address] [address]  [address], [facility] [address]     Phone:  [phone]     losartan 25 MG tablet    lovastatin 20 MG tablet                Primary Care Provider Office Phone # Fax #    [facility] [phone]       [address]  [facility] [address]        Equal Access to Services     [facility]: Hadii aad ku hadasho Soomaali, waaxda luqadaha, qaybta kaalmada adeegyada, humphrey morley . So [facility] [phone].    ATENCIÓN: Si habla español, tiene a juarez disposición servicios gratuitos de asistencia lingüística. Llame al  [phone].    We comply with applicable federal civil rights laws and [place] laws. We do not discriminate on the basis of race, color, national origin, age, disability, sex, sexual orientation, or gender identity.            Thank you!     Thank you for choosing [facility]  for your care. Our goal is always to provide you with excellent care. Hearing back from our patients is one way we can continue to improve our services. Please take a few minutes to complete the written survey that you may receive in the mail after your visit with us. Thank you!             Your Updated Medication List - Protect others around you: Learn how to safely use, store and throw away your medicines at www.disposemymeds.org.          This list is accurate as of 7/23/18 10:48 AM.  Always use your most recent med list.                   Brand Name Dispense Instructions for use Diagnosis    ALIVE WOMENS GUMMY PO           CALCIUM 600+D PLUS MINERALS 600-400 MG-UNIT Tabs      Take 0.5 tablets by mouth        denosumab 60 MG/ML Soln injection    PROLIA     Inject 60 mg Subcutaneous        losartan 25 MG tablet    COZAAR    30 tablet    Take 1 tablet (25 mg) by mouth daily    Hyperlipidemia with target LDL less than 130       lovastatin 20 MG tablet    MEVACOR    90 tablet    Take 1 tablet (20 mg) by mouth At Bedtime    Hyperlipidemia with target LDL less than 130       mometasone 0.1 % solution (lotion)    ELOCON

## 2018-07-23 NOTE — PATIENT INSTRUCTIONS
PLAN:   1.   Symptomatic therapy suggested: stop lisinopril and start losartan.    2.  Orders Placed This Encounter   Medications     Multiple Vitamins-Minerals (ALIVE WOMENS GUMMY PO)     losartan (COZAAR) 25 MG tablet     Sig: Take 1 tablet (25 mg) by mouth daily     Dispense:  30 tablet     Refill:  1     lovastatin (MEVACOR) 20 MG tablet     Sig: Take 1 tablet (20 mg) by mouth At Bedtime     Dispense:  90 tablet     Refill:  3     Orders Placed This Encounter   Procedures     Lipid panel reflex to direct LDL Fasting     Comprehensive metabolic panel     TSH with free T4 reflex     Vitamin D Deficiency     JUST IN CASE     Albumin Random Urine Quantitative with Creat Ratio     CBC with platelets     **Hepatitis C Screen Reflex to RNA FUTURE anytime     ENDOCRINOLOGY ADULT REFERRAL       3. Patient needs to follow up in if no improvement,or sooner if worsening of symptoms or other symptoms develop.  Will follow up and/or notify patient on results via phone or mail to determine further need for followup  Referral to endocrinology   Follow up nurse visit in 2 weeks for blood pressure check.  Follow up office visit in one year for annual health maintenance exam, sooner PRN.    Preventive Health Recommendations  Female Ages 65 +    Yearly exam:     See your health care provider every year in order to  o Review health changes.   o Discuss preventive care.    o Review your medicines if your doctor has prescribed any.      You no longer need a yearly Pap test unless you've had an abnormal Pap test in the past 10 years. If you have vaginal symptoms, such as bleeding or discharge, be sure to talk with your provider about a Pap test.      Every 1 to 2 years, have a mammogram.  If you are over 69, talk with your health care provider about whether or not you want to continue having screening mammograms.      Every 10 years, have a colonoscopy. Or, have a yearly FIT test (stool test). These exams will check for colon cancer.        Have a cholesterol test every 5 years, or more often if your doctor advises it.       Have a diabetes test (fasting glucose) every three years. If you are at risk for diabetes, you should have this test more often.       At age 65, have a bone density scan (DEXA) to check for osteoporosis (brittle bone disease).    Shots:    Get a flu shot each year.    Get a tetanus shot every 10 years.    Talk to your doctor about your pneumonia vaccines. There are now two you should receive - Pneumovax (PPSV 23) and Prevnar (PCV 13).    Talk to your pharmacist about the shingles vaccine.    Talk to your doctor about the hepatitis B vaccine.    Nutrition:     Eat at least 5 servings of fruits and vegetables each day.      Eat whole-grain bread, whole-wheat pasta and brown rice instead of white grains and rice.      Get adequate about Calcium and Vitamin D.     Lifestyle    Exercise at least 150 minutes a week (30 minutes a day, 5 days a week). This will help you control your weight and prevent disease.      Limit alcohol to one drink per day.      No smoking.       Wear sunscreen to prevent skin cancer.       See your dentist twice a year for an exam and cleaning.      See your eye doctor every 1 to 2 years to screen for conditions such as glaucoma, macular degeneration, cataracts, etc   It was a pleasure seeing you today at the [facility] - Primary Care. Thank you for allowing us to care for you today. We truly hope we provided you with the excellent service you deserve. Please let us know if there is anything else we can do for you so we can be sure you are leaving completley satisfied with your care experience.       General information about your clinic   Clinic Hours Lab Hours (Appointments are required)   Mon-Thurs: 7:30 AM - 7 PM Mon-Thurs: 7:30 AM - 7 PM   Fri: 7:30 AM - 5 PM Fri: 7:30 AM - 5 PM        After Hours Nurse Advise & Appts:  [facility] Advisors: [phone]  [facility] On Call: to make  appointments anytime: [phone] On Call Physician: call [phone] and answering service will page the on call physician.        For urgent appointments, please call [phone] and ask for the triage nurse or your care team clinic nurse.  How to contact my care team:  [facility]: [email or web address]   Phone: [phone]   Fax: [phone]       [facility]:   Phone: [phone]  Hours: 8:00 AM - 6:00 PM  Medication Refills:  Call your pharmacy and they will forward the refill to us. Please allow 3 business days for your refills to be completed.       Normal or non-critical lab and imaging results will be communicated to you by MyChart, letter or phone within 7 days.  If you do not hear from us within 10 days, please call the clinic. If you have a critical or abnormal lab result, we will notify you by phone as soon as possible.       We now have PWIC (Pediatric Walk in Care)  Monday-Friday from 7:30-4. Simply walk in and be seen for your urgent needs like cough, fever, rash, diarrhea or vomiting, pink eye, UTI. No appointments needed. Ask one of the team for more information      -Your Care Team:    Dr. Teresa Davis - Internal Medicine/Pediatrics   Dr. Tobias Carlos - Family Medicine  Dr. Minna Finley - Pediatrics  Dr. Paula Parada - Pediatrics  Reina Corley CNP - Family Practice Nurse Practitioner

## 2018-07-23 NOTE — NURSING NOTE
"Chief Complaint   Patient presents with     Physical     Wellness Visit       Initial /70 (BP Location: Right arm, Patient Position: Sitting, Cuff Size: Adult Regular)  Pulse 66  Temp 97.7  F (36.5  C) (Temporal)  Ht 4' 7.5\" (1.41 m)  Wt 108 lb 1.6 oz (49 kg)  SpO2 100%  Breastfeeding? No  BMI 24.67 kg/m2 Estimated body mass index is 24.67 kg/(m^2) as calculated from the following:    Height as of this encounter: 4' 7.5\" (1.41 m).    Weight as of this encounter: 108 lb 1.6 oz (49 kg).  Medication Reconciliation: complete      TG Kang      "

## 2018-07-23 NOTE — PROGRESS NOTES
SUBJECTIVE:   Bree Carrillo is a 67 year old female who presents for Preventive Visit.    Are you in the first 12 months of your Medicare Part B coverage?  No    Healthy Habits:    Do you get at least three servings of calcium containing foods daily (dairy, green leafy vegetables, etc.)? no, taking calcium and/or vitamin D supplement: yes - calcium     Amount of exercise or daily activities, outside of work: 2-3 day(s) per week    Problems taking medications regularly No    Medication side effects: Yes cough due to lisinopril     Have you had an eye exam in the past two years? yes    Do you see a dentist twice per year? yes    Do you have sleep apnea, excessive snoring or daytime drowsiness?yes      Ability to successfully perform activities of daily living: Yes, no assistance needed    Home safety:  none identified     Hearing impairment: No    Fall risk:  Fallen 2 or more times in the past year?: No  Any fall with injury in the past year?: No        COGNITIVE SCREEN  1) Repeat 3 items (Leader, Season, Table)    2) Clock draw: NORMAL  3) 3 item recall: Recalls 3 objects  Results: 3 items recalled: COGNITIVE IMPAIRMENT LESS LIKELY    Mini-CogTM Copyright S Linda. Licensed by the author for use in [facility]; reprinted with permission (jamir@[facility]). All rights reserved.        Reviewed and updated as needed this visit by clinical staff  Tobacco  Allergies  Meds  Med Hx  Surg Hx  Fam Hx  Soc Hx        Reviewed and updated as needed this visit by Provider        Social History   Substance Use Topics     Smoking status: Never Smoker     Smokeless tobacco: Never Used     Alcohol use No       If you drink alcohol do you typically have >3 drinks per day or >7 drinks per week? No                        Today's PHQ-2 Score:   PHQ-2 ( 1999 [employer]) 7/23/2018 6/28/2018   Q1: Little interest or pleasure in doing things 0 0   Q2: Feeling down, depressed or hopeless 0 0   PHQ-2 Score 0 0       Do you feel  safe in your environment - Yes    Do you have a Health Care Directive?: No: Advance care planning reviewed with patient; information given to patient to review.    Current providers sharing in care for this patient include:   Patient Care Team:  [facility] as PCP - General    The following health maintenance items are reviewed in Epic and correct as of today:  Health Maintenance   Topic Date Due     HEPATITIS C SCREENING  07/24/1967     ADVANCE DIRECTIVE PLANNING Q5 YRS  07/24/2004     DEXA SCAN SCREENING (SYSTEM ASSIGNED)  07/24/2014     INFLUENZA VACCINE (1) 09/01/2018     FALL RISK ASSESSMENT  06/28/2019     PHQ-2 Q1 YR  06/28/2019     MAMMO SCREEN Q2 YR (SYSTEM ASSIGNED)  06/28/2020     TETANUS IMMUNIZATION (SYSTEM ASSIGNED)  08/29/2022     LIPID SCREEN Q5 YR FEMALE (SYSTEM ASSIGNED)  06/28/2023     COLON CANCER SCREEN (SYSTEM ASSIGNED)  09/19/2024     PNEUMOCOCCAL  Completed     Labs reviewed in EPIC  BP Readings from Last 3 Encounters:   07/23/18 140/70   07/05/18 148/72   06/28/18 180/80    Wt Readings from Last 3 Encounters:   07/23/18 108 lb 1.6 oz (49 kg)   06/28/18 108 lb (49 kg)            Patient Active Problem List   Diagnosis     Anemia     Closed right cuboid fracture     Falls     GERD (gastroesophageal reflux disease)     GI bleed     Hyperlipidemia with target LDL less than 130     Hypertension with goal blood pressure less than 140/90     Iron deficiency anemia     Osteoporosis     Right foot pain     Vitamin D deficiency     History reviewed. No pertinent surgical history.    Social History   Substance Use Topics     Smoking status: Never Smoker     Smokeless tobacco: Never Used     Alcohol use No     Family History   Problem Relation Age of Onset     No Known Problems Mother      No Known Problems Father      No Known Problems Maternal Grandmother      No Known Problems Maternal Grandfather      No Known Problems Paternal Grandmother      No Known Problems Paternal  Grandfather      Coronary Artery Disease Brother      No Known Problems Sister      No Known Problems Son      No Known Problems Daughter      No Known Problems Maternal Half-Brother      No Known Problems Maternal Half-Sister      No Known Problems Paternal Half-Brother      No Known Problems Paternal Half-Sister      No Known Problems Niece      No Known Problems Nephew      No Known Problems Cousin      No Known Problems Other      Diabetes No family hx of      Hypertension No family hx of      Hyperlipidemia No family hx of      Cerebrovascular Disease No family hx of      Breast Cancer No family hx of      Colon Cancer No family hx of      Prostate Cancer No family hx of      Other Cancer No family hx of      Depression No family hx of      Anxiety Disorder No family hx of      Mental Illness No family hx of      Substance Abuse No family hx of      Anesthesia Reaction No family hx of      Asthma No family hx of      Osteoperosis No family hx of      Genetic Disorder No family hx of      Thyroid Disease No family hx of      Obesity No family hx of      Unknown/Adopted No family hx of          Current Outpatient Prescriptions   Medication Sig Dispense Refill     denosumab (PROLIA) 60 MG/ML SOLN injection Inject 60 mg Subcutaneous       lisinopril (PRINIVIL/ZESTRIL) 20 MG tablet Take 1 tablet (20 mg) by mouth daily 90 tablet 0     lovastatin (MEVACOR) 20 MG tablet Take 1 tablet (20 mg) by mouth At Bedtime 90 tablet 0     Calcium Carbonate-Vit D-Min (CALCIUM 600+D PLUS MINERALS) 600-400 MG-UNIT TABS Take 0.5 tablets by mouth       mometasone (ELOCON) 0.1 % solution (lotion)        Multiple Vitamins-Minerals (ALIVE WOMENS GUMMY PO)        Allergies   Allergen Reactions     Iohexol Hives     Pt experienced sneeze, itching followed by hives post 100cc Omni 350 for CT Scan.   Observed with IV in place x 45 minutes.   Given 25mg PO Benadryl with OK for 50mg to be taken every 8 hours for the next 24 hours per   "Fabian STANFORD       Pneumonia Vaccine:Up to date  Mammogram Screening: Patient over age 50, mutual decision to screen reflected in health maintenance.  Last 3 Pap and HPV Results:      ROS:  CONSTITUTIONAL:NEGATIVE for fever, chills, change in weight  INTEGUMENTARY/SKIN: NEGATIVE for worrisome rashes, moles or lesions  EYES: NEGATIVE for vision changes or irritation  ENT: NEGATIVE for ear, mouth and throat problems  RESP:NEGATIVE for significant cough or SOB  BREAST: NEGATIVE for masses, tenderness or discharge  CV: NEGATIVE for chest pain, palpitations or peripheral edema  GI: NEGATIVE for nausea, abdominal pain, heartburn, or change in bowel habits   menopausal female: amenorrhea, no unusual urinary symptoms and no unusual vaginal symptoms  MUSCULOSKELETAL:NEGATIVE for significant arthralgias or myalgia  NEURO: NEGATIVE for weakness, dizziness or paresthesias  ENDOCRINE: NEGATIVE for temperature intolerance, skin/hair changes  HEME/ALLERGY/IMMUNE: NEGATIVE for bleeding problems  PSYCHIATRIC: NEGATIVE for changes in mood or affect     OBJECTIVE:   /70 (BP Location: Right arm, Patient Position: Sitting, Cuff Size: Adult Regular)  Pulse 66  Temp 97.7  F (36.5  C) (Temporal)  Ht 4' 7.5\" (1.41 m)  Wt 108 lb 1.6 oz (49 kg)  SpO2 100%  Breastfeeding? No  BMI 24.67 kg/m2 Estimated body mass index is 24.67 kg/(m^2) as calculated from the following:    Height as of this encounter: 4' 7.5\" (1.41 m).    Weight as of this encounter: 108 lb 1.6 oz (49 kg).   Wt Readings from Last 4 Encounters:   07/23/18 108 lb 1.6 oz (49 kg)   06/28/18 108 lb (49 kg)     BP Readings from Last 6 Encounters:   07/23/18 140/70   07/05/18 148/72   06/28/18 180/80       EXAM:   GENERAL APPEARANCE: healthy, alert, no distress and slender   EYES: Eyes grossly normal to inspection and conjunctivae and sclerae normal  HENT: ear canals and TM's normal, nose and mouth without ulcers or lesions, oropharynx clear and oral mucous membranes " moist  NECK: no adenopathy, no asymmetry, masses, or scars and thyroid normal to palpation  RESP: lungs clear to auscultation - no rales, rhonchi or wheezes  BREAST: normal without masses, tenderness or nipple discharge and no palpable axillary masses or adenopathy  CV: regular rates and rhythm, no murmur, click or rub, no peripheral edema and peripheral pulses strong  ABDOMEN: soft, nontender, no hepatosplenomegaly, no masses and bowel sounds normal   (female): normal female external genitalia, normal urethral meatus, vaginal mucosal atrophy noted, normal cervix, adnexae, and uterus without masses or abnormal discharge  MS: no musculoskeletal defects are noted and gait is age appropriate without ataxia  SKIN: no suspicious lesions or rashes  NEURO: Normal strength and tone, sensory exam grossly normal, mentation intact and speech normal  PSYCH: mentation appears normal and affect normal/bright    Diagnostic Test Results:  Results for orders placed or performed in visit on 07/23/18 (from the past 24 hour(s))   Lipid panel reflex to direct LDL Fasting   Result Value Ref Range    Cholesterol 185 <200 mg/dL    Triglycerides 76 <150 mg/dL    HDL Cholesterol 78 >49 mg/dL    LDL Cholesterol Calculated 92 <100 mg/dL    Non HDL Cholesterol 107 <130 mg/dL   Comprehensive metabolic panel   Result Value Ref Range    Sodium 141 133 - 144 mmol/L    Potassium 4.0 3.4 - 5.3 mmol/L    Chloride 107 94 - 109 mmol/L    Carbon Dioxide 29 20 - 32 mmol/L    Anion Gap 5 3 - 14 mmol/L    Glucose 90 70 - 99 mg/dL    Urea Nitrogen 20 7 - 30 mg/dL    Creatinine 0.70 0.52 - 1.04 mg/dL    GFR Estimate 83 >60 mL/min/1.7m2    GFR Estimate If Black >90 >60 mL/min/1.7m2    Calcium 8.3 (L) 8.5 - 10.1 mg/dL    Bilirubin Total 0.4 0.2 - 1.3 mg/dL    Albumin 3.5 3.4 - 5.0 g/dL    Protein Total 7.5 6.8 - 8.8 g/dL    Alkaline Phosphatase 77 40 - 150 U/L    ALT 31 0 - 50 U/L    AST 24 0 - 45 U/L   TSH with free T4 reflex   Result Value Ref Range    TSH  2.35 0.40 - 4.00 mU/L   CBC with platelets   Result Value Ref Range    WBC 4.3 4.0 - 11.0 10e9/L    RBC Count 4.10 3.8 - 5.2 10e12/L    Hemoglobin 12.2 11.7 - 15.7 g/dL    Hematocrit 37.5 35.0 - 47.0 %    MCV 92 78 - 100 fl    MCH 29.8 26.5 - 33.0 pg    MCHC 32.5 31.5 - 36.5 g/dL    RDW 12.8 10.0 - 15.0 %    Platelet Count 225 150 - 450 10e9/L   Albumin Random Urine Quantitative with Creat Ratio   Result Value Ref Range    Creatinine Urine 124 mg/dL    Albumin Urine mg/L 12 mg/L    Albumin Urine mg/g Cr 10.00 0 - 25 mg/g Cr       ASSESSMENT / PLAN:   Bree was seen today for physical and wellness visit.    Diagnoses and all orders for this visit:    Encounter for routine adult health examination without abnormal findings  -     Lipid panel reflex to direct LDL Fasting  -     Comprehensive metabolic panel  -     TSH with free T4 reflex  -     Vitamin D Deficiency  -     JUST IN CASE  -     Albumin Random Urine Quantitative with Creat Ratio  -     CBC with platelets    Iron deficiency anemia, unspecified iron deficiency anemia type  -     CBC with platelets    Vitamin D deficiency  -     Vitamin D Deficiency    Hypertension with goal blood pressure less than 140/90  -     Comprehensive metabolic panel  -     Albumin Random Urine Quantitative with Creat Ratio    Hyperlipidemia with target LDL less than 130  -     Lipid panel reflex to direct LDL Fasting  -     Comprehensive metabolic panel  -     TSH with free T4 reflex  -     losartan (COZAAR) 25 MG tablet; Take 1 tablet (25 mg) by mouth daily  -     lovastatin (MEVACOR) 20 MG tablet; Take 1 tablet (20 mg) by mouth At Bedtime    Need for hepatitis C screening test  -     **Hepatitis C Screen Reflex to RNA FUTURE anytime    Osteoporosis without current pathological fracture, unspecified osteoporosis type  -     ENDOCRINOLOGY ADULT REFERRAL      PLAN:   1.   Symptomatic therapy suggested: stop lisinopril and start losartan.    2.  Orders Placed This Encounter    Medications     Multiple Vitamins-Minerals (ALIVE WOMENS GUMMY PO)     losartan (COZAAR) 25 MG tablet     Sig: Take 1 tablet (25 mg) by mouth daily     Dispense:  30 tablet     Refill:  1     lovastatin (MEVACOR) 20 MG tablet     Sig: Take 1 tablet (20 mg) by mouth At Bedtime     Dispense:  90 tablet     Refill:  3     Orders Placed This Encounter   Procedures     Lipid panel reflex to direct LDL Fasting     Comprehensive metabolic panel     TSH with free T4 reflex     Vitamin D Deficiency     JUST IN CASE     Albumin Random Urine Quantitative with Creat Ratio     CBC with platelets     **Hepatitis C Screen Reflex to RNA FUTURE anytime     ENDOCRINOLOGY ADULT REFERRAL       3. Patient needs to follow up in if no improvement,or sooner if worsening of symptoms or other symptoms develop.  Will follow up and/or notify patient on results via phone or mail to determine further need for followup  Referral to endocrinology   Follow up nurse visit in 2 weeks for blood pressure check.  Follow up office visit in one year for annual health maintenance exam, sooner PRN.      End of Life Planning:  Patient currently has an advanced directive: No.  I have verified the patient's ablity to prepare an advanced directive/make health care decisions.  Literature was provided to assist patient in preparing an advanced directive.    COUNSELING:  Reviewed preventive health counseling, as reflected in patient instructions       Regular exercise       Healthy diet/nutrition       Vision screening       Immunizations     recommended Vaccinated for: Varicella             Osteoporosis Prevention/Bone Health       Colon cancer screening       Hepatitis C screening       HIV screening for high risk patient       The 10-year ASCVD risk score ([place] VIRAMONTES Jr, et al., 2013) is: 10.7%    Values used to calculate the score:      Age: 67 years      Sex: Female      Is Non- : No      Diabetic: No      Tobacco smoker: No       "Systolic Blood Pressure: 140 mmHg      Is BP treated: Yes      HDL Cholesterol: 78 mg/dL      Total Cholesterol: 185 mg/dL       Advanced Planning     BP Readings from Last 1 Encounters:   07/23/18 140/70     Estimated body mass index is 24.67 kg/(m^2) as calculated from the following:    Height as of this encounter: 4' 7.5\" (1.41 m).    Weight as of this encounter: 108 lb 1.6 oz (49 kg).           reports that she has never smoked. She has never used smokeless tobacco.      Appropriate preventive services were discussed with this patient, including applicable screening as appropriate for cardiovascular disease, diabetes, osteopenia/osteoporosis, and glaucoma.  As appropriate for age/gender, discussed screening for colorectal cancer, prostate cancer, breast cancer, and cervical cancer. Checklist reviewing preventive services available has been given to the patient.    Reviewed patients plan of care and provided an AVS. The Intermediate Care Plan ( asthma action plan, low back pain action plan, and migraine action plan) for Bree meets the Care Plan requirement. This Care Plan has been established and reviewed with the Patient.    Counseling Resources:  ATP IV Guidelines  Pooled Cohorts Equation Calculator  Breast Cancer Risk Calculator  FRAX Risk Assessment  ICSI Preventive Guidelines  Dietary Guidelines for Americans, 2010  USDA's MyPlate  ASA Prophylaxis  Lung CA Screening    AYANNA Bowser CNP  [facility]  "

## 2018-07-23 NOTE — LETTER
July 24, 2018      Bree Carrillo                                                                [address]  [place] [address]          Dear Bree,    The results of your recent   -Liver and gallbladder tests are normal. (ALT,AST, Alk phos, bilirubin), kidney function is normal (Cr, GFR), Sodium is normal, Potassium is normal, Calcium is normal, Glucose is normal (diabetes screening test).   -Cholesterol levels are at your goal levels.  ADVISE: Continuing your medication, a regular exercise program with at least 30 minutes of aerobic exercise 3-4 days/week ( 45 minutes 4-6 days/week if weight loss needed), and a low saturated fat,/low carbohydrate diet are helpful to maintain this.  Rechecking your fasting cholesterol panel in 12 months is recommended (Lipid w/ LDL reflex).  -TSH (thyroid stimulating hormone) level is normal which indicates normal thyroid function.  -Microalbumin (urine protein) test is normal.  ADVISE: recheck annually  -Normal red blood cell (hgb) levels, normal white blood cell count and normal platelet levels.  -Vitamin D level is normal, 1000 IU daily in diet or supplements is recommended.    Hepatitis C is negative   Results for orders placed or performed in visit on 07/23/18   Lipid panel reflex to direct LDL Fasting   Result Value Ref Range    Cholesterol 185 <200 mg/dL    Triglycerides 76 <150 mg/dL    HDL Cholesterol 78 >49 mg/dL    LDL Cholesterol Calculated 92 <100 mg/dL    Non HDL Cholesterol 107 <130 mg/dL   Comprehensive metabolic panel   Result Value Ref Range    Sodium 141 133 - 144 mmol/L    Potassium 4.0 3.4 - 5.3 mmol/L    Chloride 107 94 - 109 mmol/L    Carbon Dioxide 29 20 - 32 mmol/L    Anion Gap 5 3 - 14 mmol/L    Glucose 90 70 - 99 mg/dL    Urea Nitrogen 20 7 - 30 mg/dL    Creatinine 0.70 0.52 - 1.04 mg/dL    GFR Estimate 83 >60 mL/min/1.7m2    GFR Estimate If Black >90 >60 mL/min/1.7m2    Calcium 8.3 (L) 8.5 - 10.1 mg/dL    Bilirubin Total 0.4 0.2 - 1.3 mg/dL     Albumin 3.5 3.4 - 5.0 g/dL    Protein Total 7.5 6.8 - 8.8 g/dL    Alkaline Phosphatase 77 40 - 150 U/L    ALT 31 0 - 50 U/L    AST 24 0 - 45 U/L   TSH with free T4 reflex   Result Value Ref Range    TSH 2.35 0.40 - 4.00 mU/L   Vitamin D Deficiency   Result Value Ref Range    Vitamin D Deficiency screening 30 20 - 75 ug/L   Albumin Random Urine Quantitative with Creat Ratio   Result Value Ref Range    Creatinine Urine 124 mg/dL    Albumin Urine mg/L 12 mg/L    Albumin Urine mg/g Cr 10.00 0 - 25 mg/g Cr   CBC with platelets   Result Value Ref Range    WBC 4.3 4.0 - 11.0 10e9/L    RBC Count 4.10 3.8 - 5.2 10e12/L    Hemoglobin 12.2 11.7 - 15.7 g/dL    Hematocrit 37.5 35.0 - 47.0 %    MCV 92 78 - 100 fl    MCH 29.8 26.5 - 33.0 pg    MCHC 32.5 31.5 - 36.5 g/dL    RDW 12.8 10.0 - 15.0 %    Platelet Count 225 150 - 450 10e9/L   **Hepatitis C Screen Reflex to RNA FUTURE anytime   Result Value Ref Range    Hepatitis C Antibody Nonreactive NR^Nonreactive         Please make a follow-up appointment if you have additional questions.    Sincerely,       Reina Corley RN, CNP

## 2018-07-24 LAB
DEPRECATED CALCIDIOL+CALCIFEROL SERPL-MC: 30 UG/L (ref 20–75)
HCV AB SERPL QL IA: NONREACTIVE

## 2018-08-06 ENCOUNTER — ALLIED HEALTH/NURSE VISIT (OUTPATIENT)
Dept: PEDIATRICS | Facility: CLINIC | Age: 68
End: 2018-08-06
Payer: COMMERCIAL

## 2018-08-06 VITALS — SYSTOLIC BLOOD PRESSURE: 140 MMHG | DIASTOLIC BLOOD PRESSURE: 74 MMHG | HEART RATE: 68 BPM | OXYGEN SATURATION: 98 %

## 2018-08-06 DIAGNOSIS — E78.5 HYPERLIPIDEMIA WITH TARGET LDL LESS THAN 130: ICD-10-CM

## 2018-08-06 DIAGNOSIS — I10 HYPERTENSION WITH GOAL BLOOD PRESSURE LESS THAN 140/90: Primary | ICD-10-CM

## 2018-08-06 PROCEDURE — 99207 ZZC NO CHARGE NURSE ONLY: CPT

## 2018-08-06 RX ORDER — LOSARTAN POTASSIUM 25 MG/1
25 TABLET ORAL DAILY
Qty: 90 TABLET | Refills: 1 | Status: SHIPPED | OUTPATIENT
Start: 2018-08-06 | End: 2018-09-10

## 2018-08-06 NOTE — Clinical Note
/74 (BP Location: Right arm, Patient Position: Sitting, Cuff Size: Adult Small)  Pulse 68  SpO2 98%  Misty Rausch CMA

## 2018-08-06 NOTE — PROGRESS NOTES
BP Readings from Last 6 Encounters:   08/06/18 140/74   07/23/18 140/70   07/05/18 148/72   06/28/18 180/80     Can follow up in February and does not need lab at that time

## 2018-08-06 NOTE — MR AVS SNAPSHOT
After Visit Summary   8/6/2018    Bree Carrillo    MRN: [id number]           Patient Information     Date Of Birth          1950        Visit Information        Provider Department      8/6/2018 9:00 AM MG [facility]CILL[facility]        Today's Diagnoses     Hypertension with goal blood pressure less than 140/90    -  1       Follow-ups after your visit        Your next 10 appointments already scheduled     Sep 24, 2018  8:30 AM CDT   New Visit with Kenny Arcos MD, MG [facility] NURSE ONLY   [facility])    [address] [place] [id number] [phone]            Nov 09, 2018  2:15 PM CST   New Visit with Deanna Lundberg MD   [facility])    [address] [place] [address] [id number] [phone]              Who to contact     If you have questions or need follow up information about today's clinic visit or your schedule please contact [facility] directly at [phone].  Normal or non-critical lab and imaging results will be communicated to you by [employer], letter or phone within 4 business days after the clinic has received the results. If you do not hear from us within 7 days, please contact the clinic through [employer] or phone. If you have a critical or abnormal lab result, we will notify you by phone as soon as possible.  Submit refill requests through [employer] or call your pharmacy and they will forward the refill request to us. Please allow 3 business days for your refill to be completed.          Additional Information About Your Visit        [employer] Information     [employer] is an electronic gateway that provides easy, online access to your medical records. With [employer], you can request a clinic appointment, read your test results, renew a prescription or communicate with your care team.     To sign up for [employer] visit the website at  [email or web address]   You will be asked to enter the access code listed below, as well as some personal information. Please follow the directions to create your username and password.     Your access code is: [email or web address]  Expires: 2018  8:36 AM     Your access code will  in 90 days. If you need help or a new code, please contact your [facility] or call [phone] for assistance.        Care EveryWhere ID     This is your Care EveryWhere ID. This could be used by other organizations to access your [facility] medical records  [id number]        Your Vitals Were     Pulse Pulse Oximetry                68 98%           Blood Pressure from Last 3 Encounters:   18 140/74   18 140/70   18 148/72    Weight from Last 3 Encounters:   18 108 lb 1.6 oz (49 kg)   18 108 lb (49 kg)              Today, you had the following     No orders found for display       Primary Care Provider Office Phone # Fax #    [facility] [phone]       [address]  [facility] [address]        Equal Access to Services     TAMIKO CHUNG AH: Hadii aad ku hadasho Soomaali, waaxda luqadaha, qaybta kaalmada adeegyada, humphrey alvarezin constantinn josué farias lamax strickland. So [facility] [phone].    ATENCIÓN: Si habla español, tiene a juarez disposición servicios gratuitos de asistencia lingüística. Llame al [phone].    We comply with applicable federal civil rights laws and [place] laws. We do not discriminate on the basis of race, color, national origin, age, disability, sex, sexual orientation, or gender identity.            Thank you!     Thank you for choosing [facility]  for your care. Our goal is always to provide you with excellent care. Hearing back from our patients is one way we can continue to improve our services. Please take a few minutes to complete the written survey that you may receive in the mail after your  visit with us. Thank you!             Your Updated Medication List - Protect others around you: Learn how to safely use, store and throw away your medicines at www.disposemymeds.org.          This list is accurate as of 8/6/18  9:46 AM.  Always use your most recent med list.                   Brand Name Dispense Instructions for use Diagnosis    ALIVE WOMENS GUMMY PO           CALCIUM 600+D PLUS MINERALS 600-400 MG-UNIT Tabs      Take 0.5 tablets by mouth        denosumab 60 MG/ML Soln injection    PROLIA     Inject 60 mg Subcutaneous        losartan 25 MG tablet    COZAAR    30 tablet    Take 1 tablet (25 mg) by mouth daily    Hyperlipidemia with target LDL less than 130       lovastatin 20 MG tablet    MEVACOR    90 tablet    Take 1 tablet (20 mg) by mouth At Bedtime    Hyperlipidemia with target LDL less than 130       mometasone 0.1 % solution (lotion)    ELOCON

## 2018-08-06 NOTE — PROGRESS NOTES
Subjective:   Bree Carrillo is a 68 year old female with hypertension.    Current Outpatient Prescriptions   Medication Sig Dispense Refill     Calcium Carbonate-Vit D-Min (CALCIUM 600+D PLUS MINERALS) 600-400 MG-UNIT TABS Take 0.5 tablets by mouth       denosumab (PROLIA) 60 MG/ML SOLN injection Inject 60 mg Subcutaneous       losartan (COZAAR) 25 MG tablet Take 1 tablet (25 mg) by mouth daily 30 tablet 1     lovastatin (MEVACOR) 20 MG tablet Take 1 tablet (20 mg) by mouth At Bedtime 90 tablet 3     mometasone (ELOCON) 0.1 % solution (lotion)        Multiple Vitamins-Minerals (ALIVE WOMENS GUMMY PO)           Hypertension ROS: taking medications as instructed, no medication side effects noted, patient does not perform home BP monitoring, no TIA's, no chest pain on exertion, no dyspnea on exertion, no swelling of ankles.     New concerns: Patient states she had a dry cough when she first started the Losartan medication but now she states she is fine.         /74 (BP Location: Right arm, Patient Position: Sitting, Cuff Size: Adult Small)  Pulse 68  SpO2 98%      Patient states if she needs a new prescription she would [email or web address].    Misty Rausch [facility]      Message routed to Senait Corley to review.

## 2018-08-06 NOTE — NURSING NOTE
Bree Gerardo comes into clinic today at the request of Reina Corley Ordering Provider for BP Check: BP at visit: 140/74.        This service provided today was under the supervising provider of the day Dr. Teresa Davis, who was available if needed.    Misty Rausch

## 2018-08-06 NOTE — PROGRESS NOTES
Patient advised of information below per Senait Corley.  Patient stated understanding.      Patient is wondering if she needs to still be seen every 2 weeks for a BP check or she can wait until her 6 month Hypertension recheck?      Does patient need to have labs drawn at her appt in February?    Misty Rausch CMA

## 2018-08-07 NOTE — PROGRESS NOTES
Notified patient of provider note below. Patient stated understanding and will keep scheduled hypertension recheck appointment with Reina Corley CNP on 02/07/18.  Shadia Lunsford CMA

## 2018-08-09 ENCOUNTER — TELEPHONE (OUTPATIENT)
Dept: PEDIATRICS | Facility: CLINIC | Age: 68
End: 2018-08-09

## 2018-08-09 NOTE — TELEPHONE ENCOUNTER
Spoke to [facility] pharmacy and gave them Senait fish note below.    Christen Ribeiro RN,   [facility]

## 2018-08-09 NOTE — TELEPHONE ENCOUNTER
Received fax from pharmacy. They received a new rx for losartan 25 mg tab. Patient reports lisinopril tab 10 mg allergy and there is a potential for cross-sensitivity. Please confirm if ok to fill this med? Please advise.    TG Kang

## 2018-09-10 DIAGNOSIS — E78.5 HYPERLIPIDEMIA WITH TARGET LDL LESS THAN 130: ICD-10-CM

## 2018-09-10 RX ORDER — LOSARTAN POTASSIUM 25 MG/1
25 TABLET ORAL DAILY
Qty: 90 TABLET | Refills: 1 | Status: SHIPPED | OUTPATIENT
Start: 2018-09-10 | End: 2018-10-11

## 2018-09-10 NOTE — TELEPHONE ENCOUNTER
losartan (COZAAR) 25 MG tablet 90 tablet 1 8/6/2018  No   Sig - Route: Take 1 tablet (25 mg) by mouth daily - Oral       Angiotensin-II Receptors Failed9/10 12:54 PM   Blood pressure under 140/90 in past 12 months    Recent (12 mo) or future (30 days) visit within the authorizing provider's specialty    Patient is age 18 or older    No active pregnancy on record    Normal serum creatinine on file in past 12 months    Normal serum potassium on file in past 12 months    No positive pregnancy test in past 12 months     Pharmacy   [facility] - [place], [place] [address]     Please note the change in pharmacy. Sent remaining refills to new pharmacy. Pau Fountain RN

## 2018-10-08 ENCOUNTER — OFFICE VISIT (OUTPATIENT)
Dept: OPHTHALMOLOGY | Facility: CLINIC | Age: 68
End: 2018-10-08
Payer: COMMERCIAL

## 2018-10-08 DIAGNOSIS — H25.13 AGE-RELATED NUCLEAR CATARACT OF BOTH EYES: Primary | ICD-10-CM

## 2018-10-08 DIAGNOSIS — H52.4 HYPEROPIA WITH PRESBYOPIA OF BOTH EYES: ICD-10-CM

## 2018-10-08 DIAGNOSIS — H52.03 HYPEROPIA WITH PRESBYOPIA OF BOTH EYES: ICD-10-CM

## 2018-10-08 PROCEDURE — 92015 DETERMINE REFRACTIVE STATE: CPT | Performed by: OPHTHALMOLOGY

## 2018-10-08 PROCEDURE — 92004 COMPRE OPH EXAM NEW PT 1/>: CPT | Performed by: OPHTHALMOLOGY

## 2018-10-08 ASSESSMENT — VISUAL ACUITY
METHOD: SNELLEN - LINEAR
OD_SC+: -2
OD_SC: 20/20
OS_SC: 20/25
OS_SC+: +2

## 2018-10-08 ASSESSMENT — SLIT LAMP EXAM - LIDS
COMMENTS: NORMAL
COMMENTS: NORMAL

## 2018-10-08 ASSESSMENT — CUP TO DISC RATIO
OS_RATIO: 0.3
OD_RATIO: 0.2

## 2018-10-08 ASSESSMENT — REFRACTION_MANIFEST
OD_ADD: +2.25
OD_SPHERE: +0.75
OS_SPHERE: +1.00
OD_CYLINDER: +0.25
OS_ADD: +2.25
OS_CYLINDER: SPHERE
OD_AXIS: 045

## 2018-10-08 ASSESSMENT — TONOMETRY
IOP_METHOD: TONOPEN
OD_IOP_MMHG: 14
OS_IOP_MMHG: 15

## 2018-10-08 ASSESSMENT — EXTERNAL EXAM - LEFT EYE: OS_EXAM: NORMAL

## 2018-10-08 ASSESSMENT — EXTERNAL EXAM - RIGHT EYE: OD_EXAM: NORMAL

## 2018-10-08 ASSESSMENT — CONF VISUAL FIELD
OS_NORMAL: 1
OD_NORMAL: 1

## 2018-10-08 NOTE — PROGRESS NOTES
Assessment & Plan   Bree Carrillo is a 68 year old female who presents with:   Review of systems for the eyes was negative other than the pertinent positives and negatives noted in the HPI.  History is obtained from the patient.    Chief Complaint   Patient presents with     COMPREHENSIVE EYE EXAM     Pt presents for her yearly exam.  Had Lasik surgery OU about 10 Years ago.  Has been wearing readers at times to read.           Age-related nuclear cataract of both eyes  - Not visually significant at this time. Continue to observe    Hyperopia with presbyopia of both eyes  - Rx per MR for glasses  - REFRACTION    h/o LASIK OU    Return in about 1 year (around 10/8/2019) for Annual Eye Exam.    Documentation for today's encounter was performed by Pat Tony COA. OSC. Acting as a scribe in my presence. I have reviewed and verified that it is an accurate recording of today's encounter.    Attending Physician Attestation:  Complete documentation of historical and exam elements from today's encounter can be found in the full encounter summary report (not reduplicated in this progress note).  I personally obtained the chief complaint(s) and history of present illness.  I confirmed and edited as necessary the review of systems, past medical/surgical history, family history, social history, and examination findings as documented by others; and I examined the patient myself.  I personally reviewed the relevant tests, images, and reports as documented above.  I formulated and edited as necessary the assessment and plan and discussed the findings and management plan with the patient and family. - Kenny Arcos MD

## 2018-10-08 NOTE — NURSING NOTE
Patient presents with:  COMPREHENSIVE EYE EXAM: Pt presents for her yearly exam.  Had Lasik surgery OU about 10 Years ago.  Has been wearing readers at times to read.        Referring Provider:  Referred Self, MD  No address on file    HPI    Last Eye Exam:  11/8/17   Informant(s):  Per Diego notes.   Affected eye(s):  Both   Symptoms:     Blurred vision   Difficulty with reading         Do you have eye pain now?:  No      Comments:  Occasionally uses readers for close thing.  Pt using Blink tears BID each eye.                Angelic Poe COT

## 2018-10-08 NOTE — MR AVS SNAPSHOT
After Visit Summary   10/8/2018    Bree Carrillo    MRN: [id number]           Patient Information     Date Of Birth          1950        Visit Information        Provider Department      10/8/2018 7:30 AM Kenny Arcos MD;  OPHTH NURSE ONLY [facility]        Today's Diagnoses     Age-related nuclear cataract of both eyes    -  1    Hyperopia with presbyopia of both eyes          Care Instructions    In order for us to perform a thorough eye examination, your eyes were dilated.  The affects of the dilating drops last for 4- 6 hours.  You will be more sensitive to light and vision will be blurry up close.  Mydriatic sunglasses were given if needed.  What Are Cataracts?  A clear lens in the eye focuses light. This lets the eye see images sharply. With age, the lens slowly becomes cloudy. The cloudy lens is a cataract. A cataract scatters light and makes it hard for the eye to focus. Cataracts often form in both eyes. But one lens may cloud faster than the other.      The Aging of Your Lens   Your lens may cloud so slowly that you don`t notice any vision changes at first. But as the cataract gets worse, the eye has a harder time focusing. In early stages, glasses may help you see better. As the lens gets cloudier, your doctor may recommend surgery to restore your vision.                    Follow-ups after your visit        Follow-up notes from your care team     Return in about 1 year (around 10/8/2019) for Annual Eye Exam.      Your next 10 appointments already scheduled     Nov 09, 2018  2:15 PM CST   New Visit with Deanna Lundberg MD   [facility])    [address] [facility] [address]   [phone]            Feb 07, 2019  7:30 AM CST   Return Visit with AYANNA Bowser CNP   [facility])    [address] [facility] [address]   [phone]               Who to contact     If you have questions or need follow up information about today's clinic visit or your schedule please contact [facility] directly at [phone].  Normal or non-critical lab and imaging results will be communicated to you by MyChart, letter or phone within 4 business days after the clinic has received the results. If you do not hear from us within 7 days, please contact the clinic through MyChart or phone. If you have a critical or abnormal lab result, we will notify you by phone as soon as possible.  Submit refill requests through [employer] or call your pharmacy and they will forward the refill request to us. Please allow 3 business days for your refill to be completed.          Additional Information About Your Visit        [employer] Information     [employer] is an electronic gateway that provides easy, online access to your medical records. With [employer], you can request a clinic appointment, read your test results, renew a prescription or communicate with your care team.     To sign up for [employer] visit the website at [email or web address]   You will be asked to enter the access code listed below, as well as some personal information. Please follow the directions to create your username and password.     Your access code is: [email or web address]  Expires: 2019  8:30 AM     Your access code will  in 90 days. If you need help or a new code, please contact your [facility] or call [phone] for assistance.        Care EveryWhere ID     This is your Care EveryWhere ID. This could be used by other organizations to access your [facility] medical records  [id number]         Blood Pressure from Last 3 Encounters:   18 140/74   18 140/70   18 148/72    Weight from Last 3 Encounters:   18 49 kg (108 lb 1.6 oz)   18 49 kg (108 lb)              We Performed the Following     EYE EXAM, NEW PATIENT,COMPREHESV      MARK        Primary Care Provider Office Phone # Fax #    [facility] [phone] [address]  [facility] [address]        Equal Access to Services     TAMIKO CHUNG : Hadii mary lou ku ricardoo Sosandraali, waaxda luqadaha, qaybta kaalmada adecash, humphrey prasadkarlee strickland. So [facility] [phone].    ATENCIÓN: Si habla español, tiene a juarez disposición servicios gratuitos de asistencia lingüística. Llame al [phone].    We comply with applicable federal civil rights laws and [place] laws. We do not discriminate on the basis of race, color, national origin, age, disability, sex, sexual orientation, or gender identity.            Thank you!     Thank you for choosing [facility]  for your care. Our goal is always to provide you with excellent care. Hearing back from our patients is one way we can continue to improve our services. Please take a few minutes to complete the written survey that you may receive in the mail after your visit with us. Thank you!             Your Updated Medication List - Protect others around you: Learn how to safely use, store and throw away your medicines at www.disposemymeds.org.          This list is accurate as of 10/8/18  8:30 AM.  Always use your most recent med list.                   Brand Name Dispense Instructions for use Diagnosis    ALIVE WOMENS GUMMY PO           CALCIUM 600+D PLUS MINERALS 600-400 MG-UNIT Tabs      Take 0.5 tablets by mouth        denosumab 60 MG/ML Soln injection    PROLIA     Inject 60 mg Subcutaneous        losartan 25 MG tablet    COZAAR    90 tablet    Take 1 tablet (25 mg) by mouth daily    Hyperlipidemia with target LDL less than 130       lovastatin 20 MG tablet    MEVACOR    90 tablet    Take 1 tablet (20 mg) by mouth At Bedtime    Hyperlipidemia with target LDL less than 130       mometasone 0.1 % solution (lotion)    ELOCON

## 2018-10-08 NOTE — PATIENT INSTRUCTIONS
In order for us to perform a thorough eye examination, your eyes were dilated.  The affects of the dilating drops last for 4- 6 hours.  You will be more sensitive to light and vision will be blurry up close.  Mydriatic sunglasses were given if needed.  What Are Cataracts?  A clear lens in the eye focuses light. This lets the eye see images sharply. With age, the lens slowly becomes cloudy. The cloudy lens is a cataract. A cataract scatters light and makes it hard for the eye to focus. Cataracts often form in both eyes. But one lens may cloud faster than the other.      The Aging of Your Lens   Your lens may cloud so slowly that you don`t notice any vision changes at first. But as the cataract gets worse, the eye has a harder time focusing. In early stages, glasses may help you see better. As the lens gets cloudier, your doctor may recommend surgery to restore your vision.

## 2018-10-11 DIAGNOSIS — E78.5 HYPERLIPIDEMIA WITH TARGET LDL LESS THAN 130: ICD-10-CM

## 2018-10-11 RX ORDER — LOVASTATIN 20 MG
20 TABLET ORAL AT BEDTIME
Qty: 90 TABLET | Refills: 2 | Status: SHIPPED | OUTPATIENT
Start: 2018-10-11 | End: 2019-06-20

## 2018-10-11 RX ORDER — LOSARTAN POTASSIUM 25 MG/1
25 TABLET ORAL DAILY
Qty: 90 TABLET | Refills: 1 | Status: SHIPPED | OUTPATIENT
Start: 2018-10-11 | End: 2018-10-17

## 2018-10-11 NOTE — TELEPHONE ENCOUNTER
LOVASTATIN 20 MG TABLET   Last Written Prescription Date:  09/10/2018  Last Fill Quantity: 90 TABLET,  # refills: 3   Last office visit: 8/6/2018 with prescribing provider:    Last refill per Pharmacy   Future Office Visit:      LOSARTAN 25 MG TABLET  Last Written Prescription Date:  07/23/2018  Last Fill Quantity: 90 TABLET,  # refills: 1   Last office visit: 8/6/2018 with prescribing provider:    Last refill per Pharmacy   Future Office Visit:

## 2018-10-11 NOTE — TELEPHONE ENCOUNTER
Review of medication list indicates that patient has refills available from the original prescription AT [facility].    Medication order:   lovastatin (MEVACOR) 20 MG tablet 90 tablet 3 7/23/2018  No      Sig - Route: Take 1 tablet (20 mg) by mouth At Bedtime - Oral     Class: E-Prescribe     Order: [id number]     E-Prescribing Status: Receipt confirmed by pharmacy (7/23/2018 10:42 AM CDT)       Printout Tracking      External Result Report       Pharmacy      [facility] [id number] [facility] [place] - [address] [address]       losartan (COZAAR) 25 MG tablet 90 tablet 1 9/10/2018  No      Sig - Route: Take 1 tablet (25 mg) by mouth daily - Oral     Class: E-Prescribe     Order: [id number]     E-Prescribing Status: Receipt confirmed by pharmacy (9/10/2018  2:38 PM CDT)       Printout Tracking      External Result Report       Pharmacy      [facility] [address] [facility] [place] - [address] [address]          Remaining refills sent to pharmacy per original [facility]    Christen Ribeiro RN,   [facility]

## 2018-10-17 ENCOUNTER — TELEPHONE (OUTPATIENT)
Dept: PEDIATRICS | Facility: CLINIC | Age: 68
End: 2018-10-17

## 2018-10-17 DIAGNOSIS — E78.5 HYPERLIPIDEMIA WITH TARGET LDL LESS THAN 130: ICD-10-CM

## 2018-10-17 RX ORDER — LOSARTAN POTASSIUM 25 MG/1
25 TABLET ORAL DAILY
Qty: 90 TABLET | Refills: 0 | Status: SHIPPED | OUTPATIENT
Start: 2018-10-17 | End: 2018-11-13

## 2018-10-17 NOTE — TELEPHONE ENCOUNTER
[facility] Call Center    Phone Message    May a detailed message be left on voicemail: yes    Reason for Call: Medication Question or concern regarding medication   Prescription Clarification  Name of Medication: losartan (COZAAR) 25 MG tablet [62796] (Order [id number])     Prescribing Provider: Reina Corley   Pharmacy: [facility] [id number] [place], [place] - [address] [address]  Ph. [phone]   What on the order needs clarification? Rx dosage went from 25 mg to 20 mg. Patient would like to know why dosage was decreased. Please Advise          Action Taken: Primary Care P [id number]

## 2018-10-17 NOTE — TELEPHONE ENCOUNTER
"Patient states her  went to [facility] yesterday and picked up lovastatin and lisinopril.    She states that lisinopril was discontinued and changed to losartan.  She does not know what to do with the med that was given to her from the pharmacy.  Informed patient that yes, lisinopril is not on her current med list and it was discontinued and she should be on losartan.  An RX was sent for losartan to both the [facility] and then a few days later also sent to [facility] pharmacy.  Infomred patient she will need to call her [facility] pharmacy, she said they wont take it back.   It only cost her $4 this time but she doesn't want it to happen again.      She also needs a refill to her [facility] for the losartan since she has not received her med from [facility] yet.  Sent new RX for losartan to [facility] with note to pharmacy \"alternative therapy-lisinopril discontinued.\"    Christen Ribeiro RN,   [facility]        "

## 2018-11-09 ENCOUNTER — OFFICE VISIT (OUTPATIENT)
Dept: ENDOCRINOLOGY | Facility: CLINIC | Age: 68
End: 2018-11-09
Payer: COMMERCIAL

## 2018-11-09 VITALS
DIASTOLIC BLOOD PRESSURE: 79 MMHG | HEART RATE: 79 BPM | SYSTOLIC BLOOD PRESSURE: 130 MMHG | HEIGHT: 55 IN | OXYGEN SATURATION: 99 % | WEIGHT: 111.2 LBS | BODY MASS INDEX: 25.73 KG/M2

## 2018-11-09 DIAGNOSIS — M81.0 SENILE OSTEOPOROSIS: Primary | ICD-10-CM

## 2018-11-09 DIAGNOSIS — Z13.29 SCREENING FOR THYROID DISORDER: ICD-10-CM

## 2018-11-09 LAB
ANION GAP SERPL CALCULATED.3IONS-SCNC: 6 MMOL/L (ref 3–14)
BUN SERPL-MCNC: 24 MG/DL (ref 7–30)
CALCIUM SERPL-MCNC: 9.2 MG/DL (ref 8.5–10.1)
CHLORIDE SERPL-SCNC: 106 MMOL/L (ref 94–109)
CO2 SERPL-SCNC: 28 MMOL/L (ref 20–32)
CREAT SERPL-MCNC: 0.74 MG/DL (ref 0.52–1.04)
GFR SERPL CREATININE-BSD FRML MDRD: 78 ML/MIN/1.7M2
GLUCOSE SERPL-MCNC: 79 MG/DL (ref 70–99)
POTASSIUM SERPL-SCNC: 4.1 MMOL/L (ref 3.4–5.3)
SODIUM SERPL-SCNC: 140 MMOL/L (ref 133–144)
T4 FREE SERPL-MCNC: 1.03 NG/DL (ref 0.76–1.46)
TSH SERPL DL<=0.005 MIU/L-ACNC: 2.76 MU/L (ref 0.4–4)

## 2018-11-09 PROCEDURE — 84439 ASSAY OF FREE THYROXINE: CPT | Performed by: INTERNAL MEDICINE

## 2018-11-09 PROCEDURE — 82306 VITAMIN D 25 HYDROXY: CPT | Performed by: INTERNAL MEDICINE

## 2018-11-09 PROCEDURE — 36415 COLL VENOUS BLD VENIPUNCTURE: CPT | Performed by: INTERNAL MEDICINE

## 2018-11-09 PROCEDURE — 80048 BASIC METABOLIC PNL TOTAL CA: CPT | Performed by: INTERNAL MEDICINE

## 2018-11-09 PROCEDURE — 84443 ASSAY THYROID STIM HORMONE: CPT | Performed by: INTERNAL MEDICINE

## 2018-11-09 PROCEDURE — 99204 OFFICE O/P NEW MOD 45 MIN: CPT | Performed by: INTERNAL MEDICINE

## 2018-11-09 NOTE — LETTER
11/9/2018         RE: Bree Carrillo  [address]  [facility] [address]        Dear Colleague,    Thank you for referring your patient, Bree Carrillo, to the [facility]. Please see a copy of my visit note below.                                                                               - Endocrinology Initial Consultation -    Reason for visit/consult:  Osteoporosis    Primary care provider: Clinic, [facility]    HPI: A 68 female her for osteoporosis. Osteoporosis diagnosed in 2015 with T score -2.7 lumber, by bone density. Last few month left pain hip and hands. Last 2 years she has been on prolia, so far received 4 times. Last dose of Prolia was last winter. Calcium carbnate prescribed but could not swallow because of the size.   Currently switched to Calcium carbonate gummies OTC unknown dose (4 tab).  She has never tried Fosamax.     Prior fragility fracture: no  Parental history of hip fracture: no  Rheumatoid arthritis: no  Secondary cause of osteoporosis: no  Body habitus (BMI less than or equal to 20): no  Family history of osteoporosis: no  Sedentary lifestyle: no  Current tabacco smoking: no  History of thyroid disorder: no  Calcuim and Vitmin D supplements: calicum carbonate gummies  Other supplement or bone treatment: Prolia   Medication use (PPI, epileptic medications etc): no  Early menopause (female): around 47.     Past Medical/Surgical History:  Past Medical History:   Diagnosis Date     Hypertension      Past Surgical History:   Procedure Laterality Date     LASIK BILATERAL Bilateral 2012    Dr Chavez       Allergies:  Allergies   Allergen Reactions     Iohexol Hives     Pt experienced sneeze, itching followed by hives post 100cc Omni 350 for CT Scan.   Observed with IV in place x 45 minutes.   Given 25mg PO Benadryl with OK for 50mg to be taken every 8 hours for the next 24 hours per Dr Rausch.     JRS       Current Medications   Current Outpatient  Prescriptions   Medication     denosumab (PROLIA) 60 MG/ML SOLN injection     losartan (COZAAR) 25 MG tablet     lovastatin (MEVACOR) 20 MG tablet     mometasone (ELOCON) 0.1 % solution (lotion)     Multiple Vitamins-Minerals (ALIVE WOMENS GUMMY PO)     No current facility-administered medications for this visit.        Family History:  Family History   Problem Relation Age of Onset     No Known Problems Mother      No Known Problems Father      No Known Problems Maternal Grandmother      No Known Problems Maternal Grandfather      No Known Problems Paternal Grandmother      No Known Problems Paternal Grandfather      Coronary Artery Disease Brother      No Known Problems Sister      No Known Problems Son      No Known Problems Daughter      No Known Problems Maternal Half-Brother      No Known Problems Maternal Half-Sister      No Known Problems Paternal Half-Brother      No Known Problems Paternal Half-Sister      No Known Problems Niece      No Known Problems Nephew      No Known Problems Cousin      No Known Problems Other      Diabetes No family hx of      Hypertension No family hx of      Hyperlipidemia No family hx of      Cerebrovascular Disease No family hx of      Breast Cancer No family hx of      Colon Cancer No family hx of      Prostate Cancer No family hx of      Other Cancer No family hx of      Depression No family hx of      Anxiety Disorder No family hx of      Mental Illness No family hx of      Substance Abuse No family hx of      Anesthesia Reaction No family hx of      Asthma No family hx of      Osteoporosis No family hx of      Genetic Disorder No family hx of      Thyroid Disease No family hx of      Obesity No family hx of      Unknown/Adopted No family hx of        Social History:  Social History   Substance Use Topics     Smoking status: Never Smoker     Smokeless tobacco: Never Used     Alcohol use No   lives with , adult kids (37, 35). 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  "    ROS:  Full review of systems taken with the help of the intake sheet. Otherwise a complete 14 point review of systems was taken and is negative unless stated in the history above.    Physical Exam:   Blood pressure 130/79, pulse 79, height 1.403 m (4' 7.25\"), weight 50.4 kg (111 lb 3.2 oz), SpO2 99 %, not currently breastfeeding.    General: well appearing, no acute distress, pleasant and conversant,   Mental Status/neuro: alert and oriented  Face: symmetrical, normal facial color  Eyes: anicteric, PERRL, no proptosis or lid lag  Neck: suppler, no lymphadenopahty  Thyroid: normal size and texture, no nodule palpable, no bruits  Back: No scoliosis no kyphosis  Heart: regular rhythm, S1S2, no murmur appreciated  Lung: clear to auscultation bilaterally  Abdomen: soft, NT/ND, no hepatomegaly  Legs: no swelling or edema      Labs : I reviewed data from epic and extract and summarize the pertinent data here.   Lab Results   Component Value Date     07/23/2018      Lab Results   Component Value Date    POTASSIUM 4.0 07/23/2018     Lab Results   Component Value Date    CHLORIDE 107 07/23/2018     Lab Results   Component Value Date    DEVAN 8.3 07/23/2018     Lab Results   Component Value Date    CO2 29 07/23/2018     Lab Results   Component Value Date    BUN 20 07/23/2018     Lab Results   Component Value Date    CR 0.70 07/23/2018     Lab Results   Component Value Date    GLC 90 07/23/2018     Lab Results   Component Value Date    TSH 2.35 07/23/2018     Lab Results   Component Value Date    T4 0.96 06/28/2018       Bone Denisty: 8/4/2017: I also personally reviewed the original images and explained to the patient .      1.  This patient's T-score meets the World Health Organization (WHO)   criteria for osteoporosis at one or more measured sites (T-score -2.5 or   below).  The risk of osteoporotic fracture increases approximately   two-fold for each 1.0 SD decrease in T-score.    2.  Moderate levoscoliosis of " lumbar spine.       COMPARISON:  Comparison with previous study dated 06/17/2015 shows:    There was a statistically significant increase in the bone mineral density   in the spine.    There was no statistically significant change in the bone mineral density   in the right hip.    There was a statistically significant decrease in the bone mineral density   in the left hip.      Comparison with baseline study dated 09/05/2013 shows:    There was a statistically significant increase in the bone mineral density   in the spine.    There was no statistically significant change in the bone mineral density   in the right hip.    There was a statistically significant decrease in the bone mineral density   in the left hip.      Statistical significance is determined by the least significant change   (LSC) for the scanner and operators at this facility.    FRAX is not reported because this patient's T-score meets the World Health   Organization (WHO) criteria for osteoporosis and this patient is currently   taking medication for abnormal bone density.    The scan details are available in the patient's chart in [facility].    Radha Navarro M.D.    Breast/Diagnostic Radiologist  [facility]  [email or web address]  SJA:collin  R:8/4/2017 T:8/4/2017   Result Narrative   DIAGNOSTIC DXA BONE MINERAL DENSITY, 8/4/2017    CLINICAL HISTORY:  This is a 67-year-old female patient.      RISK FACTORS:  The patient has estrogen deficiency.  The patient has a   history of osteoporosis based on a prior BMD study.  The patient has a   body weight under 127 pounds.  The patient has the following medical   condition(s):  Vitamin D deficiency.  The patient has taken for at least   one month the following medications in the last year:  Medications for   osteoporosis.    TECHNIQUE:  Dual-energy x-ray absorptiometry system (axial skeleton).  The   patient was scanned on a GE Lunar Prodigy scanner, fast-array scan  mode.    The study was technically adequate.      FINDINGS:       BMD T-Score Z-Score   Lumbar Spine (L1 to L4) 0.896 g/cm2 -2.4 -0.2   Right Hip Femoral Neck 0.758 g/cm2 -2.0 -0.1   Right Total Hip (BMD for comparison to prior) 0.773 g/cm2 -1.9 -0.1   Left Hip Femoral Neck 0.688 g/cm2 -2.5 -0.6   Left Total Hip (BMD for comparison to prior) 0.757 g/cm2 -2.0 -0.2     6/17/2015    DIAGNOSTIC DXA BONE MINERAL DENSITY, 6/17/2015    CLINICAL HISTORY:  This is a 64-year-old female patient.      RISK FACTORS:  The patient has estrogen deficiency.  The patient has a   history of low bone density based on a prior BMD study (osteopenia).  The   patient has a body weight under 127 pounds.      TECHNIQUE:  Dual-energy x-ray absorptiometry system (axial skeleton).  The   patient was scanned on a GE Lunar Prodigy scanner, fast-array scan mode.    The study was technically adequate.      FINDINGS:       BMD T-Score Z-Score   Lumbar Spine (L1 to L4) 0.861 g/cm2 -2.7 -0.6   Right Hip Femoral Neck 0.748 g/cm2 -2.1 -0.3   Right Total Hip   0.739 g/cm2 -2.1 -0.6   Left Hip Femoral Neck 0.722 g/cm2 -2.3 -0.5   Left Total Hip   0.808 g/cm2 -1.6 0.0     Assessment and Plan  68 year old female with osteoporosis    # Osteoporosis  Compared to 2015, 2017 bone density showed lumbar slight improvement from a -2.7 up to -2.4.  However left hip decreased bone density, which she has had pain recently.  Prolia seems working, we will modify calcium supplement and continue Prolia for now.     - Continue current Prolia (#5) will be within this year  - Calcium citrate plus D petite (elemental Ca 800 mg, Vitamin D 1000IU), considered the size of pills  - Vitamin D level today  - BMP (Ca) today    #History of subclinical hypothyroidism   will check thyroid function today    - TSH, free T4    RTC with me in 1 year.    I spent 45 minutes with this patient face to face and explained the conditions and plans (more than 50% of time was  counseling/coordination of care, explained the concept with T score, explained the discussed the results of the bone density test with additional images in 2017 in 2015, discussed about the bone physiology, explained the treatment plan of osteoporosis, explained today's blood work.) . The patient understood and is satisfied with today's visit. Return to clinic with me in 1 year.         Deanna Lundberg MD  Staff Physician  Endocrinology and Metabolism  License: [id number]    Again, thank you for allowing me to participate in the care of your patient.        Sincerely,        Deanna Lundberg MD

## 2018-11-09 NOTE — MR AVS SNAPSHOT
After Visit Summary   11/9/2018    Bree Carrillo    MRN: [id number]           Patient Information     Date Of Birth          1950        Visit Information        Provider Department      11/9/2018 2:15 PM Deanna Lundberg MD [facility]        Today's Diagnoses     Senile osteoporosis    -  1    Screening for thyroid disorder          Care Instructions    Prolia injection before the end of the year, Dexa - September 2019, please call [phone].  Lab work today.  We will call you with results in 5-7 business days.   Labs before one year follow up with Dr Lundberg.          Follow-ups after your visit        Follow-up notes from your care team     Return in about 1 year (around 11/9/2019).      Your next 10 appointments already scheduled     Nov 21, 2018  2:00 PM CST   Level O with [address] ([facility])    [address] [place] [address]   [phone]            Feb 07, 2019  7:30 AM CST   Return Visit with AYANNA Bowser [facility] ([facility])    [address] [place] [id number]   [phone]            Nov 08, 2019  8:30 AM CST   Return Visit with Deanna Lundberg MD   [facility])    [address] [place] [id number]   [phone]              Future tests that were ordered for you today     Open Future Orders        Priority Expected Expires Ordered    Vitamin D Deficiency Routine  11/9/2019 11/9/2018    Basic metabolic panel Routine  11/9/2019 11/9/2018    TSH Routine  11/9/2019 11/9/2018    T4 free Routine 12/21/2018 1/28/2019 11/9/2018    DX Hip/Pelvis/Spine Routine  11/9/2019 11/9/2018            Who to contact     If you have questions or need follow up information about today's clinic visit or your schedule please contact [facility] directly at  "[phone].  Normal or non-critical lab and imaging results will be communicated to you by [employer], letter or phone within 4 business days after the clinic has received the results. If you do not hear from us within 7 days, please contact the clinic through [employer] or phone. If you have a critical or abnormal lab result, we will notify you by phone as soon as possible.  Submit refill requests through [employer] or call your pharmacy and they will forward the refill request to us. Please allow 3 business days for your refill to be completed.          Additional Information About Your Visit        [employer] Information     [employer] is an electronic gateway that provides easy, online access to your medical records. With [employer], you can request a clinic appointment, read your test results, renew a prescription or communicate with your care team.     To sign up for [employer] visit the website at [email or web address]   You will be asked to enter the access code listed below, as well as some personal information. Please follow the directions to create your username and password.     Your access code is: [email or web address]  Expires: 2019  7:30 AM     Your access code will  in 90 days. If you need help or a new code, please contact your [facility] or call [phone] for assistance.        Care EveryWhere ID     This is your Care EveryWhere ID. This could be used by other organizations to access your [facility] medical records  [id number]        Your Vitals Were     Pulse Height Pulse Oximetry BMI (Body Mass Index)          79 1.403 m (4' 7.25\") 99% 25.61 kg/m2         Blood Pressure from Last 3 Encounters:   18 130/79   18 140/74   18 140/70    Weight from Last 3 Encounters:   18 50.4 kg (111 lb 3.2 oz)   18 49 kg (108 lb 1.6 oz)   18 49 kg (108 lb)                 Today's Medication Changes          These changes are accurate as of 18  3:33 PM.  " If you have any questions, ask your nurse or doctor.               Stop taking these medicines if you haven't already. Please contact your care team if you have questions.     CALCIUM 600+D PLUS MINERALS 600-400 MG-UNIT Tabs   Stopped by:  Deanna Lundberg MD                    Primary Care Provider Office Phone # Fax #    L[facility]to [facility] [phone]       [address]  [facility] [address]        Equal Access to Services     [facility] : Hadii aad ku hadasho Soomaali, waaxda luqadaha, qaybta kaalmada adeegyada, waxay idiin hayaan adeeg khjemmaerin la'aan . So [place] -[phone].    ATENCIÓN: Si habla español, tiene a juarez disposición servicios gratuitos de asistencia lingüística. [facility] [phone].    We comply with applicable federal civil rights laws and [place] laws. We do not discriminate on the basis of race, color, national origin, age, disability, sex, sexual orientation, or gender identity.            Thank you!     Thank you for choosing [facility]  for your care. Our goal is always to provide you with excellent care. Hearing back from our patients is one way we can continue to improve our services. Please take a few minutes to complete the written survey that you may receive in the mail after your visit with us. Thank you!             Your Updated Medication List - Protect others around you: Learn how to safely use, store and throw away your medicines at www.disposemymeds.org.          This list is accurate as of 11/9/18  3:33 PM.  Always use your most recent med list.                   Brand Name Dispense Instructions for use Diagnosis    ALIVE WOMENS GUMMY PO           denosumab 60 MG/ML Soln injection    PROLIA     Inject 60 mg Subcutaneous        losartan 25 MG tablet    COZAAR    90 tablet    Take 1 tablet (25 mg) by mouth daily    Hyperlipidemia with target LDL less than 130       lovastatin 20 MG tablet    MEVACOR    90 tablet    Take 1 tablet  (20 mg) by mouth At Bedtime    Hyperlipidemia with target LDL less than 130       mometasone 0.1 % solution (lotion)    ELOCON

## 2018-11-09 NOTE — NURSING NOTE
"Bree Carrillo's goals for this visit include: follow up osteoporosis  She requests these members of her care team be copied on today's visit information: Yes    PCP: [facility]    Referring Provider:  Reina Corley, APRN CNP  [address]  [place] [address]    /79 (BP Location: Left arm, Patient Position: Sitting, Cuff Size: Adult Regular)  Pulse 79  Ht 1.403 m (4' 7.25\")  Wt 50.4 kg (111 lb 3.2 oz)  SpO2 99%  BMI 25.61 kg/m2    Do you need any medication refills at today's visit? No    "

## 2018-11-09 NOTE — PATIENT INSTRUCTIONS
Prolia injection before the end of the year, Dexa - September 2019, please call [phone].  Lab work today.  We will call you with results in 5-7 business days.   Labs before one year follow up with Dr Lundberg.

## 2018-11-09 NOTE — LETTER
Patient:  Bree Carrillo  :   1950  MRN:     [id number]        Ms.Kokila Carrillo  [address]  [facility] [address]        2018    Dear ,    We are writing to inform you of your test results.  Thyroid level, calcium level, kidney function all looks normal.      Please take care    Deanna Lundberg MD          Resulted Orders   Basic metabolic panel   Result Value Ref Range    Sodium 140 133 - 144 mmol/L    Potassium 4.1 3.4 - 5.3 mmol/L    Chloride 106 94 - 109 mmol/L    Carbon Dioxide 28 20 - 32 mmol/L    Anion Gap 6 3 - 14 mmol/L    Glucose 79 70 - 99 mg/dL      Comment:      Non Fasting    Urea Nitrogen 24 7 - 30 mg/dL    Creatinine 0.74 0.52 - 1.04 mg/dL    GFR Estimate 78 >60 mL/min/1.7m2      Comment:      Non  GFR Calc    GFR Estimate If Black >90 >60 mL/min/1.7m2      Comment:       GFR Calc    Calcium 9.2 8.5 - 10.1 mg/dL   TSH   Result Value Ref Range    TSH 2.76 0.40 - 4.00 mU/L   T4 free   Result Value Ref Range    T4 Free 1.03 0.76 - 1.46 ng/dL       Deanna Lundberg MD

## 2018-11-12 LAB — DEPRECATED CALCIDIOL+CALCIFEROL SERPL-MC: 51 UG/L (ref 20–75)

## 2018-11-13 ENCOUNTER — TELEPHONE (OUTPATIENT)
Dept: ENDOCRINOLOGY | Facility: CLINIC | Age: 68
End: 2018-11-13

## 2018-11-13 ENCOUNTER — TELEPHONE (OUTPATIENT)
Dept: PEDIATRICS | Facility: CLINIC | Age: 68
End: 2018-11-13

## 2018-11-13 DIAGNOSIS — E78.5 HYPERLIPIDEMIA WITH TARGET LDL LESS THAN 130: ICD-10-CM

## 2018-11-13 RX ORDER — LOSARTAN POTASSIUM 25 MG/1
25 TABLET ORAL DAILY
Qty: 90 TABLET | Refills: 0 | Status: SHIPPED | OUTPATIENT
Start: 2018-11-13 | End: 2019-02-07

## 2018-11-13 NOTE — TELEPHONE ENCOUNTER
[facility] Call Center    Phone Message    May a detailed message be left on voicemail: yes    Reason for Call: Medication Refill Request    Has the patient contacted the pharmacy for the refill? Yes   Name of medication being requested: losartan (COZAAR) 25 MG tablet [[id number]] (Order [id number])     Provider who prescribed the medication: Reina Corley  Pharmacy: [facility]  Date medication is needed: ASAP         Action Taken: Message routed to:  Primary Care p [id number]

## 2018-11-13 NOTE — TELEPHONE ENCOUNTER
Called patient, she is asking if she can get her losartan filled at [facility] pharmacy, the local pharmacy, [facility] gave her a 10 day supply temp fill.    Per RN protocol, refilled losartan at [facility].    She also wanted to know if she is able to take lovastatin in the morning.    Huddle with Senait Corley NP, ok to take in the morning.

## 2018-11-13 NOTE — TELEPHONE ENCOUNTER
Per Dr. Lundberg:    Her kidney function perfect, thyroid looks middle of normal, which is great. Vitamin D level perfect     Deanna         Patient advised. Patient verbalizes understanding and agrees to plan.     Yoselin Meza RN  Endocrine Care Coordinator  [facility]

## 2018-11-13 NOTE — TELEPHONE ENCOUNTER
[facility] Call Center    Phone Message    May a detailed message be left on voicemail: yes    Reason for Call: Other: Pt requesting call back with lab results. Pt is always wondering if she needs to have blood work again in a couple weeks. Please advise.      Action Taken: Message routed to:  Adult Clinics: Endocrinology p [phone]

## 2018-11-13 NOTE — TELEPHONE ENCOUNTER
Per 11/19/18 progress note from Dr. Lundberg:  - Continue current Prolia (#5) will be within this year  - Calcium citrate plus D petite (elemental Ca 800 mg, Vitamin D 1000IU), considered the size of pills  - Vitamin D level today  - BMP (Ca) today     #History of subclinical hypothyroidism   will check thyroid function today     - TSH, free T4     RTC with me in 1 year.        Component      Latest Ref Rng & Units 11/9/2018   Sodium      133 - 144 mmol/L 140   Potassium      3.4 - 5.3 mmol/L 4.1   Chloride      94 - 109 mmol/L 106   Carbon Dioxide      20 - 32 mmol/L 28   Anion Gap      3 - 14 mmol/L 6   Glucose      70 - 99 mg/dL 79   Urea Nitrogen      7 - 30 mg/dL 24   Creatinine      0.52 - 1.04 mg/dL 0.74   GFR Estimate      >60 mL/min/1.7m2 78   GFR Estimate If Black      >60 mL/min/1.7m2 >90   Calcium      8.5 - 10.1 mg/dL 9.2   Vitamin D Deficiency screening      20 - 75 ug/L 51   TSH      0.40 - 4.00 mU/L 2.76   T4 Free      0.76 - 1.46 ng/dL 1.03         Will send labs to Dr. Lundberg to review.         Yoselin Meza, RN  Endocrine Care Coordinator  [facility]

## 2018-11-20 DIAGNOSIS — T50.995S ADVERSE EFFECT OF OTHER DRUGS, MEDICAMENTS AND BIOLOGICAL SUBSTANCES, SEQUELA: ICD-10-CM

## 2018-11-20 DIAGNOSIS — M81.0 OSTEOPOROSIS WITHOUT CURRENT PATHOLOGICAL FRACTURE, UNSPECIFIED OSTEOPOROSIS TYPE: ICD-10-CM

## 2018-11-20 DIAGNOSIS — M81.0 SENILE OSTEOPOROSIS: Primary | ICD-10-CM

## 2018-11-20 NOTE — LETTER
December 2, 2019      Bree Carrillo  [address]  [place] [address]            Dear Bree Carrillo:    This is to remind you that your provider wanted you to return to the clinic for a Dexa Scan and follow up appointment.      You may call our office to schedule an appointment [phone].     Please disregard this notice if you have already had your Dexa done or made an appointment.    Sincerely,      Joy Dee [facility]  Adult Endocrinology  [facility]

## 2018-11-20 NOTE — PROGRESS NOTES
Per Dr. Toño todd to add T50.995S Adverse effect of other drugs, medicaments and biological substances, sequela diagnosis to Prolia order.      Prolia order updated.      Yoselin Meza, RN  Endocrine Care Coordinator  [facility]

## 2018-11-28 ENCOUNTER — INFUSION THERAPY VISIT (OUTPATIENT)
Dept: INFUSION THERAPY | Facility: CLINIC | Age: 68
End: 2018-11-28
Payer: COMMERCIAL

## 2018-11-28 ENCOUNTER — TELEPHONE (OUTPATIENT)
Dept: PEDIATRICS | Facility: CLINIC | Age: 68
End: 2018-11-28

## 2018-11-28 VITALS
TEMPERATURE: 98.1 F | SYSTOLIC BLOOD PRESSURE: 167 MMHG | HEART RATE: 79 BPM | OXYGEN SATURATION: 99 % | DIASTOLIC BLOOD PRESSURE: 79 MMHG

## 2018-11-28 DIAGNOSIS — E78.5 HYPERLIPIDEMIA WITH TARGET LDL LESS THAN 130: ICD-10-CM

## 2018-11-28 DIAGNOSIS — T50.995S ADVERSE EFFECT OF OTHER DRUGS, MEDICAMENTS AND BIOLOGICAL SUBSTANCES, SEQUELA: Primary | ICD-10-CM

## 2018-11-28 DIAGNOSIS — M81.0 SENILE OSTEOPOROSIS: ICD-10-CM

## 2018-11-28 PROCEDURE — 96372 THER/PROPH/DIAG INJ SC/IM: CPT | Performed by: INTERNAL MEDICINE

## 2018-11-28 PROCEDURE — 99207 ZZC NO CHARGE LOS: CPT

## 2018-11-28 NOTE — PROGRESS NOTES
Infusion Nursing Note:  Bree Carrillo presents today for Prolia.    Patient seen by provider today: No   present during visit today: Not Applicable.    Note: N/A.    Intravenous Access:  No Intravenous access/labs at this visit.    Treatment Conditions:  Not Applicable.      Post Infusion Assessment:  Patient tolerated injection without incident.    Discharge Plan:   Patient and/or family verbalized understanding of discharge instructions and all questions answered.  Patient discharged in stable condition accompanied by: self.  Departure Mode: Ambulatory.    Deysi Landaverde RN

## 2018-11-28 NOTE — TELEPHONE ENCOUNTER
[facility] Call Center    Phone Message  refill - losartan (COZAAR) 25 MG tablet [[id number]] please call patient when this has been called in or sent in would like 3 months at a time  [facility] - [place], [place] - [address]      May a detailed message be left on voicemail: yes    Reason for Call: Other: refill - losartan (COZAAR) 25 MG tablet [[id number]] please call patient when this has been called in or sent in     Action Taken: Message routed to:  Primary Care p [phone]

## 2018-11-28 NOTE — TELEPHONE ENCOUNTER
Per chart review, this was filled on 11/13/18 for a 90 day supply. Should not need refill at this time. Patient notified.    Aubree Aguirre RN   [facility]

## 2018-11-28 NOTE — MR AVS SNAPSHOT
After Visit Summary   11/28/2018    Bree Carrillo    MRN: [id number]           Patient Information     Date Of Birth          1950        Visit Information        Provider Department      11/28/2018 4:00 PM BAY 9 [facility]        Today's Diagnoses     Adverse effect of other drugs, medicaments and biological substances, sequela    -  1    Senile osteoporosis           Follow-ups after your visit        Your next 10 appointments already scheduled     Feb 07, 2019  7:30 AM CST   Return Visit with AYANNA Bowser CNP   [facility])    [address] [place] [id number] [phone]            Nov 08, 2019  8:30 AM CST   Return Visit with Deanna Lundberg MD   [facility])    [address] [place] [id number] [phone]              Who to contact     If you have questions or need follow up information about today's clinic visit or your schedule please contact [facility] directly at [phone].  Normal or non-critical lab and imaging results will be communicated to you by [employer], letter or phone within 4 business days after the clinic has received the results. If you do not hear from us within 7 days, please contact the clinic through [employer] or phone. If you have a critical or abnormal lab result, we will notify you by phone as soon as possible.  Submit refill requests through [employer] or call your pharmacy and they will forward the refill request to us. Please allow 3 business days for your refill to be completed.          Additional Information About Your Visit        [employer] Information     [employer] is an electronic gateway that provides easy, online access to your medical records. With [employer], you can request a clinic appointment, read your test results, renew a prescription or communicate with your care team.     To  sign up for [employer] visit the website at [email or web address]   You will be asked to enter the access code listed below, as well as some personal information. Please follow the directions to create your username and password.     Your access code is: [email or web address]  Expires: 2019  7:30 AM     Your access code will  in 90 days. If you need help or a new code, please contact your [facility] or call [phone] for assistance.        Care EveryWhere ID     This is your Care EveryWhere ID. This could be used by other organizations to access your [place] medical records  [id number]        Your Vitals Were     Pulse Temperature Pulse Oximetry             79 98.1  F (36.7  C) (Oral) 99%          Blood Pressure from Last 3 Encounters:   18 167/79   18 130/79   18 140/74    Weight from Last 3 Encounters:   18 50.4 kg (111 lb 3.2 oz)   18 49 kg (108 lb 1.6 oz)   18 49 kg (108 lb)              Today, you had the following     No orders found for display       Primary Care Provider Office Phone # Fax #    [facility] [phone]       [address]  [facility] [address]        Equal Access to Services     TAMIKO CHUNG AH: Hadii aad ku hadasho Soomaali, waaxda luqadaha, qaybta kaalmada adeegyada, humphrey strickland. So [facility] [phone].    ATENCIÓN: Si habla español, tiene a juarez disposición servicios gratuitos de asistencia lingüística. Llame al [phone].    We comply with applicable federal civil rights laws and [place] laws. We do not discriminate on the basis of race, color, national origin, age, disability, sex, sexual orientation, or gender identity.            Thank you!     Thank you for choosing [facility]  for your care. Our goal is always to provide you with excellent care. Hearing back from our patients is one way we can continue to improve our  services. Please take a few minutes to complete the written survey that you may receive in the mail after your visit with us. Thank you!             Your Updated Medication List - Protect others around you: Learn how to safely use, store and throw away your medicines at www.disposemymeds.org.          This list is accurate as of 11/28/18  4:22 PM.  Always use your most recent med list.                   Brand Name Dispense Instructions for use Diagnosis    ALIVE WOMENS GUMMY PO           denosumab 60 MG/ML Soln injection    PROLIA     Inject 60 mg Subcutaneous        losartan 25 MG tablet    COZAAR    90 tablet    Take 1 tablet (25 mg) by mouth daily    Hyperlipidemia with target LDL less than 130       lovastatin 20 MG tablet    MEVACOR    90 tablet    Take 1 tablet (20 mg) by mouth At Bedtime    Hyperlipidemia with target LDL less than 130       mometasone 0.1 % external solution    ELOCON

## 2019-01-14 ENCOUNTER — TELEPHONE (OUTPATIENT)
Dept: PEDIATRICS | Facility: CLINIC | Age: 69
End: 2019-01-14

## 2019-01-14 DIAGNOSIS — I10 HYPERTENSION WITH GOAL BLOOD PRESSURE LESS THAN 140/90: Primary | ICD-10-CM

## 2019-01-14 NOTE — TELEPHONE ENCOUNTER
"Patient has scheduled appointment with Reina Corley CNP on 02/07/19 for \"recheck hypertension\". Patient is asking if any lab work is needed this day?    Last labs ordered by PCP were completed 07/23/18 with recheck cholesterol levels again in one year. No labs overdue per Health Maintenance. Routing message to Reina Corley CNP for review.  Shadia Lunsford CMA    "

## 2019-01-14 NOTE — TELEPHONE ENCOUNTER
[facility] Call Center    Phone Message    May a detailed message be left on voicemail: yes    Reason for Call: Pt would like a call back to discuss if she is supposed to get labs done prior to her appt on 2/7/19. Please advise.     Action Taken: Message routed to:  Primary Care p [phone]

## 2019-01-14 NOTE — TELEPHONE ENCOUNTER
BP Readings from Last 6 Encounters:   11/28/18 167/79   11/09/18 130/79   08/06/18 140/74   07/23/18 140/70   07/05/18 148/72   06/28/18 180/80     nonfasting labs I will place the lab order for her for appointment

## 2019-02-04 NOTE — PROGRESS NOTES
SUBJECTIVE:   Bree Carrillo is a 68 year old female who presents to clinic today for the following health issues:      Hypertension Follow-up      Outpatient blood pressures are not being checked.    Low Salt Diet: not monitoring salt      Amount of exercise or physical activity: 2-3 days/week for an average of 15-30 minutes- walking around track at gym    Problems taking medications regularly: No    Medication side effects: none    Diet: regular (no restrictions)      Make sure glucose is ok, and labs ok    Problem list and histories reviewed & adjusted, as indicated.  Additional history: as documented    Patient Active Problem List   Diagnosis     Anemia     Closed right cuboid fracture     Falls     GERD (gastroesophageal reflux disease)     GI bleed     Hyperlipidemia with target LDL less than 130     Hypertension with goal blood pressure less than 140/90     Iron deficiency anemia     Osteoporosis     Right foot pain     Vitamin D deficiency     Senile osteoporosis     Adverse effect of other drugs, medicaments and biological substances, sequela     Past Surgical History:   Procedure Laterality Date     LASIK BILATERAL Bilateral 2012    Dr Chavez       Social History     Tobacco Use     Smoking status: Never Smoker     Smokeless tobacco: Never Used   Substance Use Topics     Alcohol use: No     Family History   Problem Relation Age of Onset     No Known Problems Mother      No Known Problems Father      No Known Problems Maternal Grandmother      No Known Problems Maternal Grandfather      No Known Problems Paternal Grandmother      No Known Problems Paternal Grandfather      Coronary Artery Disease Brother      No Known Problems Sister      No Known Problems Son      No Known Problems Daughter      No Known Problems Maternal Half-Brother      No Known Problems Maternal Half-Sister      No Known Problems Paternal Half-Brother      No Known Problems Paternal Half-Sister      No Known Problems Niece      No Known  Problems Nephew      No Known Problems Cousin      No Known Problems Other      Diabetes No family hx of      Hypertension No family hx of      Hyperlipidemia No family hx of      Cerebrovascular Disease No family hx of      Breast Cancer No family hx of      Colon Cancer No family hx of      Prostate Cancer No family hx of      Other Cancer No family hx of      Depression No family hx of      Anxiety Disorder No family hx of      Mental Illness No family hx of      Substance Abuse No family hx of      Anesthesia Reaction No family hx of      Asthma No family hx of      Osteoporosis No family hx of      Genetic Disorder No family hx of      Thyroid Disease No family hx of      Obesity No family hx of      Unknown/Adopted No family hx of          Current Outpatient Medications   Medication Sig Dispense Refill     Calcium-Phosphorus-Vitamin D (CITRACAL +D3 PO) Take 2 tablets by mouth 2 times daily       denosumab (PROLIA) 60 MG/ML SOLN injection Inject 60 mg Subcutaneous       losartan (COZAAR) 25 MG tablet Take 1 tablet (25 mg) by mouth daily 90 tablet 0     lovastatin (MEVACOR) 20 MG tablet Take 1 tablet (20 mg) by mouth At Bedtime 90 tablet 2     mometasone (ELOCON) 0.1 % solution (lotion)        Multiple Vitamins-Minerals (ALIVE WOMENS GUMMY PO)        Allergies   Allergen Reactions     Iohexol Hives     Pt experienced sneeze, itching followed by hives post 100cc Omni 350 for CT Scan.   Observed with IV in place x 45 minutes.   Given 25mg PO Benadryl with OK for 50mg to be taken every 8 hours for the next 24 hours per Dr Rausch.     JRS     BP Readings from Last 3 Encounters:   02/07/19 145/76   11/28/18 167/79   11/09/18 130/79    Wt Readings from Last 3 Encounters:   02/07/19 50.8 kg (112 lb)   11/09/18 50.4 kg (111 lb 3.2 oz)   07/23/18 49 kg (108 lb 1.6 oz)                  Labs reviewed in EPIC    Reviewed and updated as needed this visit by clinical staff       Reviewed and updated as needed this visit by  Provider         ROS:  CONSTITUTIONAL:NEGATIVE for fever, chills, change in weight  RESP:NEGATIVE for significant cough or SOB  CV: NEGATIVE for chest pain, palpitations or peripheral edema  GI: NEGATIVE for nausea, abdominal pain, heartburn, or change in bowel habits  MUSCULOSKELETAL: NEGATIVE for significant arthralgias or myalgia  NEURO: POSITIVE for dizziness/lightheadedness and NEGATIVE for HX CVA, HX TIA, HX seizure D/O, involuntary movements, gait disturbance, memory problems, paresthesias , syncope and tremor   ENDOCRINE: NEGATIVE for temperature intolerance, skin/hair changes    OBJECTIVE:     /76 (BP Location: Right arm, Patient Position: Sitting, Cuff Size: Adult Regular)   Pulse 76   Temp 97.4  F (36.3  C)   Wt 50.8 kg (112 lb)   BMI 25.80 kg/m    Body mass index is 25.8 kg/m .  GENERAL: Patient is well nourished, well developed,in no apparent distress  EYES: Eyes grossly normal to inspection and conjunctivae and sclerae normal  HENT:ear canals and TM's normal and nose and mouth without ulcers or lesions   NECK:normal, supple and no adenopathy  CARDIAC:regular rates and rhythm, no murmur, click or rub and no irregular beats  carotids with brisk upstroke/without bruit bilaterally and without LE edema bilaterally  RESP: clear to auscultation  ABD:soft, nontender  SKIN: Skin color, texture, turgor normal. No rashes or lesions.  MS: extremities normal- no gross deformities noted, gait normal and normal muscle tone  NEURO: mentation intact and speech normal  PSYCH: Alert, oriented, thought content appropriate,mentation appears normal., affect and mood normal    Diagnostic Test Results:  Results for orders placed or performed in visit on 02/07/19   **Basic metabolic panel FUTURE anytime   Result Value Ref Range    Sodium 142 133 - 144 mmol/L    Potassium 4.2 3.4 - 5.3 mmol/L    Chloride 106 94 - 109 mmol/L    Carbon Dioxide 30 20 - 32 mmol/L    Anion Gap 6 3 - 14 mmol/L    Glucose 97 70 - 99 mg/dL     Urea Nitrogen 18 7 - 30 mg/dL    Creatinine 0.77 0.52 - 1.04 mg/dL    GFR Estimate 78 >60 mL/min/[1.73_m2]    GFR Estimate If Black >90 >60 mL/min/[1.73_m2]    Calcium 9.0 8.5 - 10.1 mg/dL       ASSESSMENT/PLAN:     Bree was seen today for hypertension.    Diagnoses and all orders for this visit:    Hypertension with goal blood pressure less than 140/90  -     losartan (COZAAR) 50 MG tablet; Take 1 tablet (50 mg) by mouth daily  HTN Plan:  1)  Medication: dosage change: losartan 50 mg   2)  Dietary sodium restriction  3)  Regular aerobic exercise  4)  Recheck in 6 months, sooner should new symptoms or   problems arise.  5) See todays orders.    Patient Education: Reviewed risks of hypertension and principles of   treatment.    Hyperlipidemia with target LDL less than 130  Continue current medication regimen unchanged.      Patient Instructions     PLAN:   1.   Symptomatic therapy suggested: increase losartan to 50 mg a day  2.  Orders Placed This Encounter   Medications     Calcium-Phosphorus-Vitamin D (CITRACAL +D3 PO)     Sig: Take 2 tablets by mouth 2 times daily     losartan (COZAAR) 50 MG tablet     Sig: Take 1 tablet (50 mg) by mouth daily     Dispense:  90 tablet     Refill:  3       3. Patient needs to follow up in if no improvement,or sooner if worsening of symptoms or other symptoms develop.   physical exam in 6 months       See Patient Instructions    AYANNA Bowser CNP [facility]

## 2019-02-07 ENCOUNTER — OFFICE VISIT (OUTPATIENT)
Dept: PEDIATRICS | Facility: CLINIC | Age: 69
End: 2019-02-07
Payer: COMMERCIAL

## 2019-02-07 VITALS
SYSTOLIC BLOOD PRESSURE: 145 MMHG | WEIGHT: 112 LBS | BODY MASS INDEX: 25.8 KG/M2 | TEMPERATURE: 97.4 F | DIASTOLIC BLOOD PRESSURE: 76 MMHG | HEART RATE: 76 BPM

## 2019-02-07 DIAGNOSIS — I10 HYPERTENSION WITH GOAL BLOOD PRESSURE LESS THAN 140/90: ICD-10-CM

## 2019-02-07 DIAGNOSIS — E78.5 HYPERLIPIDEMIA WITH TARGET LDL LESS THAN 130: ICD-10-CM

## 2019-02-07 DIAGNOSIS — I10 HYPERTENSION WITH GOAL BLOOD PRESSURE LESS THAN 140/90: Primary | ICD-10-CM

## 2019-02-07 LAB
ANION GAP SERPL CALCULATED.3IONS-SCNC: 6 MMOL/L (ref 3–14)
BUN SERPL-MCNC: 18 MG/DL (ref 7–30)
CALCIUM SERPL-MCNC: 9 MG/DL (ref 8.5–10.1)
CHLORIDE SERPL-SCNC: 106 MMOL/L (ref 94–109)
CO2 SERPL-SCNC: 30 MMOL/L (ref 20–32)
CREAT SERPL-MCNC: 0.77 MG/DL (ref 0.52–1.04)
GFR SERPL CREATININE-BSD FRML MDRD: 78 ML/MIN/{1.73_M2}
GLUCOSE SERPL-MCNC: 97 MG/DL (ref 70–99)
POTASSIUM SERPL-SCNC: 4.2 MMOL/L (ref 3.4–5.3)
SODIUM SERPL-SCNC: 142 MMOL/L (ref 133–144)

## 2019-02-07 PROCEDURE — 99213 OFFICE O/P EST LOW 20 MIN: CPT | Performed by: NURSE PRACTITIONER

## 2019-02-07 PROCEDURE — 80048 BASIC METABOLIC PNL TOTAL CA: CPT | Performed by: NURSE PRACTITIONER

## 2019-02-07 PROCEDURE — 36415 COLL VENOUS BLD VENIPUNCTURE: CPT | Performed by: NURSE PRACTITIONER

## 2019-02-07 RX ORDER — LOSARTAN POTASSIUM 50 MG/1
50 TABLET ORAL DAILY
Qty: 90 TABLET | Refills: 3 | Status: SHIPPED | OUTPATIENT
Start: 2019-02-07 | End: 2019-07-25

## 2019-02-07 NOTE — PATIENT INSTRUCTIONS
PLAN:   1.   Symptomatic therapy suggested: increase losartan to 50 mg a day  2.  Orders Placed This Encounter   Medications     Calcium-Phosphorus-Vitamin D (CITRACAL +D3 PO)     Sig: Take 2 tablets by mouth 2 times daily     losartan (COZAAR) 50 MG tablet     Sig: Take 1 tablet (50 mg) by mouth daily     Dispense:  90 tablet     Refill:  3       3. Patient needs to follow up in if no improvement,or sooner if worsening of symptoms or other symptoms develop.   physical exam in 6 months

## 2019-02-21 ENCOUNTER — TELEPHONE (OUTPATIENT)
Dept: ENDOCRINOLOGY | Facility: CLINIC | Age: 69
End: 2019-02-21

## 2019-02-21 NOTE — TELEPHONE ENCOUNTER
Left message for patient to return call.    Edwina Carey LPN  Clinic Coordinator  Adult Endocrinology and Pediatric Specialty Clinics  [facility]

## 2019-02-21 NOTE — TELEPHONE ENCOUNTER
[facility] Call Center    Phone Message    May a detailed message be left on voicemail: yes    Reason for Call: Medication Refill Request    Has the patient contacted the pharmacy for the refill? Yes   Name of medication being requested: Calcium-Phosphorus-Vitamin D (CITRACAL +D3 PO)  Provider who prescribed the medication: Dr. Lundberg  Pharmacy:    [facility]/PHARMACY #[id number] - [place], [place] - [address] [place] AT [facility]  Date medication is needed: asap         Action Taken: Message routed to:  Adult Clinics: Endocrinology p [phone]

## 2019-02-25 NOTE — TELEPHONE ENCOUNTER
2.25.19  Patient calling endo back from 2.21.19 regarding her calcium refill. Please call back.        [facility]/PHARMACY #[id number] - [place], [place] - [address], [place] AT [facility]

## 2019-02-25 NOTE — TELEPHONE ENCOUNTER
Contacted patient to review. Patient reports that she had been getting her Citracal calcium plus d3 petites. Patient did not have specific dosing on hand. Patient states that she usually gets it on [employer] but now wants to get it from her pharmacy. Patient is going to bring bottle to pharmacy and they will send request to Dr. Lundberg to refill.       Yoselin Meza RN  Endocrine Care Coordinator  [facility]

## 2019-03-05 ENCOUNTER — TELEPHONE (OUTPATIENT)
Dept: ENDOCRINOLOGY | Facility: CLINIC | Age: 69
End: 2019-03-05

## 2019-03-05 DIAGNOSIS — M81.0 OSTEOPOROSIS WITHOUT CURRENT PATHOLOGICAL FRACTURE, UNSPECIFIED OSTEOPOROSIS TYPE: Primary | ICD-10-CM

## 2019-03-05 NOTE — TELEPHONE ENCOUNTER
Patient calling to provide the name and dose of the calcium she is taking at home. States she is taking Citracal Calcium 400mg 2 tabs(800mg) BID. Endocrinology team updated.  Bee Harris RN, BSN, OCN

## 2019-03-05 NOTE — TELEPHONE ENCOUNTER
Medication list updated.     Yoselin Meza, RN  Endocrine Care Coordinator  [facility]

## 2019-05-31 ENCOUNTER — INFUSION THERAPY VISIT (OUTPATIENT)
Dept: INFUSION THERAPY | Facility: CLINIC | Age: 69
End: 2019-05-31
Payer: COMMERCIAL

## 2019-05-31 VITALS
BODY MASS INDEX: 26.23 KG/M2 | WEIGHT: 113.9 LBS | RESPIRATION RATE: 18 BRPM | TEMPERATURE: 97.8 F | DIASTOLIC BLOOD PRESSURE: 79 MMHG | OXYGEN SATURATION: 98 % | HEART RATE: 73 BPM | SYSTOLIC BLOOD PRESSURE: 160 MMHG

## 2019-05-31 DIAGNOSIS — T50.995S ADVERSE EFFECT OF OTHER DRUGS, MEDICAMENTS AND BIOLOGICAL SUBSTANCES, SEQUELA: Primary | ICD-10-CM

## 2019-05-31 DIAGNOSIS — M81.0 SENILE OSTEOPOROSIS: ICD-10-CM

## 2019-05-31 PROCEDURE — 96372 THER/PROPH/DIAG INJ SC/IM: CPT | Performed by: INTERNAL MEDICINE

## 2019-05-31 PROCEDURE — 99207 ZZC NO CHARGE LOS: CPT

## 2019-05-31 NOTE — PROGRESS NOTES
"Infusion Nursing Note:  Bree Carrillo presents today for Prolia .    Patient seen by provider today: No   present during visit today: Not Applicable.    Note: when asked about \"pain level\" patient mentions having intermittent sharp pain in alternating sides of her groin.  She also states that she fell without reason.  I instructed her to call primary and make an appt.      Intravenous Access:  No Intravenous access/labs at this visit.    Treatment Conditions:  Not Applicable.    Post Infusion Assessment:  Patient tolerated injection without incident.     Discharge Plan:   Patient given scheduling # to call for 6 mo prolia appt.    GHAZALA SHEPARD RN                        "

## 2019-06-20 DIAGNOSIS — E78.5 HYPERLIPIDEMIA WITH TARGET LDL LESS THAN 130: ICD-10-CM

## 2019-06-20 RX ORDER — LOVASTATIN 20 MG
20 TABLET ORAL AT BEDTIME
Qty: 90 TABLET | Refills: 0 | Status: SHIPPED | OUTPATIENT
Start: 2019-06-20 | End: 2019-07-25

## 2019-06-20 NOTE — TELEPHONE ENCOUNTER
Pending Prescriptions:                       Disp   Refills    lovastatin (MEVACOR) 20 MG tablet         90 tab*2            Sig: Take 1 tablet (20 mg) by mouth At Bedtime      Last Written Prescription Date:  10/11/18  Last Fill Quantity: 90,  # refills: 2   Last office visit: 2/7/2019 with prescribing provider:  Reina Corley CNP   Future Office Visit:   Next 5 appointments (look out 90 days)    Jul 25, 2019  7:30 AM CDT  PHYSICAL with AYANNA Bowser CNP  [facility] ([facility]) [address] [place] [id number]  [phone]

## 2019-07-01 ENCOUNTER — ANCILLARY PROCEDURE (OUTPATIENT)
Dept: MAMMOGRAPHY | Facility: CLINIC | Age: 69
End: 2019-07-01
Payer: COMMERCIAL

## 2019-07-01 DIAGNOSIS — Z12.31 VISIT FOR SCREENING MAMMOGRAM: ICD-10-CM

## 2019-07-01 PROCEDURE — 77067 SCR MAMMO BI INCL CAD: CPT | Performed by: RADIOLOGY

## 2019-07-23 ENCOUNTER — DOCUMENTATION ONLY (OUTPATIENT)
Dept: LAB | Facility: CLINIC | Age: 69
End: 2019-07-23

## 2019-07-23 DIAGNOSIS — Z13.1 SCREENING FOR DIABETES MELLITUS (DM): ICD-10-CM

## 2019-07-23 DIAGNOSIS — E78.5 HYPERLIPIDEMIA WITH TARGET LDL LESS THAN 130: ICD-10-CM

## 2019-07-23 DIAGNOSIS — Z13.29 SCREENING FOR THYROID DISORDER: ICD-10-CM

## 2019-07-23 DIAGNOSIS — Z00.00 ENCOUNTER FOR ROUTINE ADULT HEALTH EXAMINATION WITHOUT ABNORMAL FINDINGS: Primary | ICD-10-CM

## 2019-07-23 DIAGNOSIS — I10 HYPERTENSION WITH GOAL BLOOD PRESSURE LESS THAN 140/90: ICD-10-CM

## 2019-07-25 ENCOUNTER — ANCILLARY PROCEDURE (OUTPATIENT)
Dept: GENERAL RADIOLOGY | Facility: CLINIC | Age: 69
End: 2019-07-25
Attending: NURSE PRACTITIONER
Payer: COMMERCIAL

## 2019-07-25 ENCOUNTER — OFFICE VISIT (OUTPATIENT)
Dept: PEDIATRICS | Facility: CLINIC | Age: 69
End: 2019-07-25
Payer: COMMERCIAL

## 2019-07-25 VITALS
WEIGHT: 112.4 LBS | BODY MASS INDEX: 25.28 KG/M2 | SYSTOLIC BLOOD PRESSURE: 111 MMHG | HEART RATE: 72 BPM | DIASTOLIC BLOOD PRESSURE: 70 MMHG | TEMPERATURE: 97.8 F | HEIGHT: 56 IN | OXYGEN SATURATION: 100 %

## 2019-07-25 DIAGNOSIS — Z13.1 SCREENING FOR DIABETES MELLITUS (DM): ICD-10-CM

## 2019-07-25 DIAGNOSIS — Z13.0 SCREENING FOR DISORDER OF BLOOD AND BLOOD-FORMING ORGANS: ICD-10-CM

## 2019-07-25 DIAGNOSIS — Z00.00 ENCOUNTER FOR MEDICARE ANNUAL WELLNESS EXAM: Primary | ICD-10-CM

## 2019-07-25 DIAGNOSIS — E78.5 HYPERLIPIDEMIA WITH TARGET LDL LESS THAN 130: ICD-10-CM

## 2019-07-25 DIAGNOSIS — R79.89 ELEVATED TSH: ICD-10-CM

## 2019-07-25 DIAGNOSIS — Z13.29 SCREENING FOR THYROID DISORDER: ICD-10-CM

## 2019-07-25 DIAGNOSIS — M54.50 BILATERAL LOW BACK PAIN WITHOUT SCIATICA, UNSPECIFIED CHRONICITY: ICD-10-CM

## 2019-07-25 DIAGNOSIS — D64.9 ANEMIA, UNSPECIFIED TYPE: ICD-10-CM

## 2019-07-25 DIAGNOSIS — Z00.00 ENCOUNTER FOR ROUTINE ADULT HEALTH EXAMINATION WITHOUT ABNORMAL FINDINGS: ICD-10-CM

## 2019-07-25 DIAGNOSIS — I10 HYPERTENSION WITH GOAL BLOOD PRESSURE LESS THAN 140/90: ICD-10-CM

## 2019-07-25 LAB
ALBUMIN SERPL-MCNC: 3.4 G/DL (ref 3.4–5)
ALP SERPL-CCNC: 85 U/L (ref 40–150)
ALT SERPL W P-5'-P-CCNC: 22 U/L (ref 0–50)
ANION GAP SERPL CALCULATED.3IONS-SCNC: 5 MMOL/L (ref 3–14)
AST SERPL W P-5'-P-CCNC: 17 U/L (ref 0–45)
BILIRUB SERPL-MCNC: 0.4 MG/DL (ref 0.2–1.3)
BUN SERPL-MCNC: 23 MG/DL (ref 7–30)
CALCIUM SERPL-MCNC: 8.8 MG/DL (ref 8.5–10.1)
CHLORIDE SERPL-SCNC: 109 MMOL/L (ref 94–109)
CHOLEST SERPL-MCNC: 169 MG/DL
CO2 SERPL-SCNC: 26 MMOL/L (ref 20–32)
CREAT SERPL-MCNC: 0.75 MG/DL (ref 0.52–1.04)
CREAT UR-MCNC: 177 MG/DL
ERYTHROCYTE [DISTWIDTH] IN BLOOD BY AUTOMATED COUNT: 12.7 % (ref 10–15)
GFR SERPL CREATININE-BSD FRML MDRD: 81 ML/MIN/{1.73_M2}
GLUCOSE SERPL-MCNC: 94 MG/DL (ref 70–99)
HCT VFR BLD AUTO: 35.3 % (ref 35–47)
HDLC SERPL-MCNC: 67 MG/DL
HGB BLD-MCNC: 11.4 G/DL (ref 11.7–15.7)
LDLC SERPL CALC-MCNC: 87 MG/DL
MCH RBC QN AUTO: 29.2 PG (ref 26.5–33)
MCHC RBC AUTO-ENTMCNC: 32.3 G/DL (ref 31.5–36.5)
MCV RBC AUTO: 91 FL (ref 78–100)
MICROALBUMIN UR-MCNC: 16 MG/L
MICROALBUMIN/CREAT UR: 9.27 MG/G CR (ref 0–25)
NONHDLC SERPL-MCNC: 102 MG/DL
PLATELET # BLD AUTO: 225 10E9/L (ref 150–450)
POTASSIUM SERPL-SCNC: 3.8 MMOL/L (ref 3.4–5.3)
PROT SERPL-MCNC: 7.2 G/DL (ref 6.8–8.8)
RBC # BLD AUTO: 3.9 10E12/L (ref 3.8–5.2)
SODIUM SERPL-SCNC: 140 MMOL/L (ref 133–144)
T4 FREE SERPL-MCNC: 1.11 NG/DL (ref 0.76–1.46)
TRIGL SERPL-MCNC: 77 MG/DL
TSH SERPL DL<=0.005 MIU/L-ACNC: 6.88 MU/L (ref 0.4–4)
WBC # BLD AUTO: 5.4 10E9/L (ref 4–11)

## 2019-07-25 PROCEDURE — 80061 LIPID PANEL: CPT | Performed by: NURSE PRACTITIONER

## 2019-07-25 PROCEDURE — 36415 COLL VENOUS BLD VENIPUNCTURE: CPT | Performed by: NURSE PRACTITIONER

## 2019-07-25 PROCEDURE — G0439 PPPS, SUBSEQ VISIT: HCPCS | Performed by: NURSE PRACTITIONER

## 2019-07-25 PROCEDURE — 84443 ASSAY THYROID STIM HORMONE: CPT | Performed by: NURSE PRACTITIONER

## 2019-07-25 PROCEDURE — 85027 COMPLETE CBC AUTOMATED: CPT | Performed by: NURSE PRACTITIONER

## 2019-07-25 PROCEDURE — 80053 COMPREHEN METABOLIC PANEL: CPT | Performed by: NURSE PRACTITIONER

## 2019-07-25 PROCEDURE — 82043 UR ALBUMIN QUANTITATIVE: CPT | Performed by: NURSE PRACTITIONER

## 2019-07-25 PROCEDURE — 72100 X-RAY EXAM L-S SPINE 2/3 VWS: CPT | Performed by: RADIOLOGY

## 2019-07-25 PROCEDURE — 84439 ASSAY OF FREE THYROXINE: CPT | Performed by: NURSE PRACTITIONER

## 2019-07-25 PROCEDURE — 99213 OFFICE O/P EST LOW 20 MIN: CPT | Mod: 25 | Performed by: NURSE PRACTITIONER

## 2019-07-25 RX ORDER — LOVASTATIN 20 MG
20 TABLET ORAL AT BEDTIME
Qty: 90 TABLET | Refills: 3 | Status: SHIPPED | OUTPATIENT
Start: 2019-07-25 | End: 2020-08-24

## 2019-07-25 RX ORDER — LOSARTAN POTASSIUM 50 MG/1
50 TABLET ORAL DAILY
Qty: 90 TABLET | Refills: 3 | Status: SHIPPED | OUTPATIENT
Start: 2019-07-25 | End: 2020-08-24

## 2019-07-25 ASSESSMENT — MIFFLIN-ST. JEOR: SCORE: 883.87

## 2019-07-25 NOTE — PATIENT INSTRUCTIONS
PLAN:   1.   Symptomatic therapy suggested: OTC tylenol arthritis as needed  and call prn if symptoms persist or worsen.  2.  Orders Placed This Encounter   Medications     lovastatin (MEVACOR) 20 MG tablet     Sig: Take 1 tablet (20 mg) by mouth At Bedtime     Dispense:  90 tablet     Refill:  3     losartan (COZAAR) 50 MG tablet     Sig: Take 1 tablet (50 mg) by mouth daily     Dispense:  90 tablet     Refill:  3     Orders Placed This Encounter   Procedures     XR Lumbar Spine 2/3 Views     CBC with platelets     TSH with free T4 reflex     3. Patient needs to follow up in if no improvement,or sooner if worsening of symptoms or other symptoms develop.  FUTURE LABS:       - Schedule a non-fasting blood draw 1 month to recheck thyroid   Lumbar spine xray   Will follow up and/or notify patient on results via phone or mail to determine further need for followup  Consider physical therapy   Follow up in 1 year.  Follow up office visit in one year for annual health maintenance exam, sooner PRN.    Patient Education   Personalized Prevention Plan  You are due for the preventive services outlined below.  Your care team is available to assist you in scheduling these services.  If you have already completed any of these items, please share that information with your care team to update in your medical record.  Health Maintenance Due   Topic Date Due     Osteoporosis Screening  1950     Discuss Advance Care Planning  1950     PHQ-2  01/01/2019     FALL RISK ASSESSMENT  07/23/2019     Annual Wellness Visit  07/23/2019

## 2019-07-25 NOTE — PROGRESS NOTES
"  SUBJECTIVE:   Bree Carrillo is a 69 year old female who presents for Preventive Visit.    Are you in the first 12 months of your Medicare Part B coverage?  No    Physical Health:    In general, how would you rate your overall physical health? good    Outside of work, how many days during the week do you exercise? 2-3 days/week    Outside of work, approximately how many minutes a day do you exercise?45-60 minutes    If you drink alcohol do you typically have >3 drinks per day or >7 drinks per week? No    Do you usually eat at least 4 servings of fruit and vegetables a day, include whole grains & fiber and avoid regularly eating high fat or \"junk\" foods? NO    Do you have any problems taking medications regularly?  No    Do you have any side effects from medications? none    Needs assistance for the following daily activities: no assistance needed    Which of the following safety concerns are present in your home?  none identified     Hearing impairment: No    In the past 6 months, have you been bothered by leaking of urine? no    Mental Health:    In general, how would you rate your overall mental or emotional health? good  PHQ-2 Score:      Do you feel safe in your environment? Yes    Do you have a Health Care Directive? No: Advance care planning was reviewed with patient; patient declined at this time.    Additional concerns to address?  YES-patient notes having some generalized pain around the back, pelvic region on and off for 1 month. Has been taking ibuprofen for the pain. She thinks she might have arthritis     Fall risk:  Fallen 2 or more times in the past year?: No  Any fall with injury in the past year?: No    Cognitive Screenin) Repeat 3 items (Leader, Season, Table)   2) Clock draw: NORMAL  3) 3 item recall: Recalls 1 object   Results: NORMAL clock, 1-2 items recalled: COGNITIVE IMPAIRMENT LESS LIKELY    Mini-CogTM Copyright ROMINA Mckeon. Licensed by the author for use in [facility]; " reprinted with permission ([email or web address]). All rights reserved.      Do you have sleep apnea, excessive snoring or daytime drowsiness?: no        PROBLEMS TO ADD ON...  Back Pain  Duration of complaint: for last couple weeks    Specific cause: None  Description:   Location of pain: low back bilateral  Character of pain: dull ache  Pain radiation:none  Intensity: mild, moderate  Accompanying Signs & Symptoms:  Fever: no  Numbness or weakness in legs:  no  Dysuria or Hematuria: no  Bowel or bladder incontinence: no  History:   Any injury (lifting, bending, twisting): no  Work Injury: no  History of back problems: no prior back problems  Any previous MRI or X-rays: no  Any history of back surgery: no  Any cancer history: no  Precipitating factors:   Worsened by: Nothing.  Alleviating factors:  Improved by: ibuprofen   Therapies Tried and outcome: NSAIDS    Reviewed and updated as needed this visit by clinical staff  Tobacco  Allergies  Meds  Med Hx  Surg Hx  Fam Hx  Soc Hx        Reviewed and updated as needed this visit by Provider        Social History     Tobacco Use     Smoking status: Never Smoker     Smokeless tobacco: Never Used   Substance Use Topics     Alcohol use: No                           Current providers sharing in care for this patient include:   Patient Care Team:  Reina Corley APRN CNP as PCP - General (Family Practice)    The following health maintenance items are reviewed in Epic and correct as of today:  Health Maintenance   Topic Date Due     DEXA  1950     ADVANCE CARE PLANNING  1950     PHQ-2  01/01/2019     FALL RISK ASSESSMENT  07/23/2019     MEDICARE ANNUAL WELLNESS VISIT  07/23/2019     INFLUENZA VACCINE (1) 09/01/2019     EYE EXAM  10/08/2019     MAMMO SCREENING  07/01/2021     DTAP/TDAP/TD IMMUNIZATION (2 - Td) 08/29/2022     LIPID  07/23/2023     COLONOSCOPY  09/19/2024     HEPATITIS C SCREENING  Completed     PNEUMOCOCCAL IMMUNIZATION 65+ LOW/MEDIUM RISK   Completed     ZOSTER IMMUNIZATION  Completed     IPV IMMUNIZATION  Aged Out     MENINGITIS IMMUNIZATION  Aged Out     Lab work is in process  Labs reviewed in EPIC  BP Readings from Last 3 Encounters:   07/25/19 111/70   05/31/19 160/79   02/07/19 145/76    Wt Readings from Last 3 Encounters:   07/25/19 51 kg (112 lb 6.4 oz)   05/31/19 51.7 kg (113 lb 14.4 oz)   02/07/19 50.8 kg (112 lb)                  Patient Active Problem List   Diagnosis     Anemia     Closed right cuboid fracture     Falls     GERD (gastroesophageal reflux disease)     GI bleed     Hyperlipidemia with target LDL less than 130     Hypertension with goal blood pressure less than 140/90     Iron deficiency anemia     Osteoporosis     Right foot pain     Vitamin D deficiency     Senile osteoporosis     Adverse effect of other drugs, medicaments and biological substances, sequela     Past Surgical History:   Procedure Laterality Date     LASIK BILATERAL Bilateral 2012    Dr Chavez       Social History     Tobacco Use     Smoking status: Never Smoker     Smokeless tobacco: Never Used   Substance Use Topics     Alcohol use: No     Family History   Problem Relation Age of Onset     No Known Problems Mother      No Known Problems Father      No Known Problems Maternal Grandmother      No Known Problems Maternal Grandfather      No Known Problems Paternal Grandmother      No Known Problems Paternal Grandfather      Coronary Artery Disease Brother      No Known Problems Sister      No Known Problems Son      No Known Problems Daughter      No Known Problems Maternal Half-Brother      No Known Problems Maternal Half-Sister      No Known Problems Paternal Half-Brother      No Known Problems Paternal Half-Sister      No Known Problems Niece      No Known Problems Nephew      No Known Problems Cousin      No Known Problems Other      Diabetes No family hx of      Hypertension No family hx of      Hyperlipidemia No family hx of      Cerebrovascular Disease No  family hx of      Breast Cancer No family hx of      Colon Cancer No family hx of      Prostate Cancer No family hx of      Other Cancer No family hx of      Depression No family hx of      Anxiety Disorder No family hx of      Mental Illness No family hx of      Substance Abuse No family hx of      Anesthesia Reaction No family hx of      Asthma No family hx of      Osteoporosis No family hx of      Genetic Disorder No family hx of      Thyroid Disease No family hx of      Obesity No family hx of      Unknown/Adopted No family hx of          Current Outpatient Medications   Medication Sig Dispense Refill     Calcium Citrate-Vitamin D (CITRACAL + D) 250-200 MG-UNIT TABS Citracal Calcium 400mg 2 tabs(800mg) BID       denosumab (PROLIA) 60 MG/ML SOLN injection Inject 60 mg Subcutaneous       losartan (COZAAR) 50 MG tablet Take 1 tablet (50 mg) by mouth daily 90 tablet 3     lovastatin (MEVACOR) 20 MG tablet Take 1 tablet (20 mg) by mouth At Bedtime 90 tablet 3     mometasone (ELOCON) 0.1 % solution (lotion)        Multiple Vitamins-Minerals (ALIVE WOMENS GUMMY PO)        Allergies   Allergen Reactions     Iohexol Hives     Pt experienced sneeze, itching followed by hives post 100cc Omni 350 for CT Scan.   Observed with IV in place x 45 minutes.   Given 25mg PO Benadryl with OK for 50mg to be taken every 8 hours for the next 24 hours per Dr Rausch.     ABDOULAYE     Pneumonia Vaccine:Up to date    Mammogram Screening: Mammogram Screening: Patient over age 50, mutual decision to screen reflected in health maintenance.    ROS:  CONSTITUTIONAL:NEGATIVE for fever, chills, change in weight  INTEGUMENTARY/SKIN: NEGATIVE for worrisome rashes, moles or lesions  EYES: NEGATIVE for vision changes or irritation  ENT: NEGATIVE for ear, mouth and throat problems  RESP:NEGATIVE for significant cough or SOB  BREAST: NEGATIVE for masses, tenderness or discharge  CV: NEGATIVE for chest pain, palpitations or peripheral edema  GI: NEGATIVE  "for nausea, abdominal pain, heartburn, or change in bowel habits   menopausal female: amenorrhea, no unusual urinary symptoms and no unusual vaginal symptoms  MUSCULOSKELETAL:POSITIVE  for back pain  and NEGATIVE for joint swelling  and joint warmth   NEURO: NEGATIVE for weakness, dizziness or paresthesias  ENDOCRINE: NEGATIVE for temperature intolerance, skin/hair changes  HEME/ALLERGY/IMMUNE: POSITIVE  for anemia and NEGATIVE for allergies, night sweats and weight loss  PSYCHIATRIC: NEGATIVE for changes in mood or affect     OBJECTIVE:   /70 (BP Location: Right arm, Patient Position: Sitting, Cuff Size: Adult Regular)   Pulse 72   Temp 97.8  F (36.6  C) (Temporal)   Ht 1.416 m (4' 7.75\")   Wt 51 kg (112 lb 6.4 oz)   SpO2 100%   Breastfeeding? No   BMI 25.43 kg/m   Estimated body mass index is 26.23 kg/m  as calculated from the following:    Height as of 11/9/18: 1.403 m (4' 7.25\").    Weight as of 5/31/19: 51.7 kg (113 lb 14.4 oz).  EXAM:   GENERAL APPEARANCE: healthy, alert and no distress  EYES: Eyes grossly normal to inspection and conjunctivae and sclerae normal  HENT: ear canals and TM's normal, nose and mouth without ulcers or lesions, oropharynx clear and oral mucous membranes moist  NECK: no adenopathy, no asymmetry, masses, or scars and thyroid normal to palpation  RESP: lungs clear to auscultation - no rales, rhonchi or wheezes  BREAST: normal without masses, tenderness or nipple discharge and no palpable axillary masses or adenopathy  CV: regular rate and rhythm, normal S1 S2, no S3 or S4, no murmur, click or rub, no peripheral edema and peripheral pulses strong  ABDOMEN: soft, nontender, no hepatosplenomegaly, no masses and bowel sounds normal   (female): deferred  MS: no musculoskeletal defects are noted and gait is age appropriate without ataxia  BACK: Lumbosacral spine area reveals local tenderness.  Painful and reduced LS ROM noted. Straight leg raise is negative  at 70 degrees on " bilaterally.  DTR's, motor strength and sensation normal, including heel and toe gait.  Perifpheral pulses are palpable.  SKIN: no suspicious lesions or rashes  NEURO: Normal strength and tone, sensory exam grossly normal, mentation intact and speech normal  PSYCH: mentation appears normal and affect normal/bright    Diagnostic Test Results:  Results for orders placed or performed in visit on 07/25/19   CBC with platelets   Result Value Ref Range    WBC 5.4 4.0 - 11.0 10e9/L    RBC Count 3.90 3.8 - 5.2 10e12/L    Hemoglobin 11.4 (L) 11.7 - 15.7 g/dL    Hematocrit 35.3 35.0 - 47.0 %    MCV 91 78 - 100 fl    MCH 29.2 26.5 - 33.0 pg    MCHC 32.3 31.5 - 36.5 g/dL    RDW 12.7 10.0 - 15.0 %    Platelet Count 225 150 - 450 10e9/L     Recent Results (from the past 744 hour(s))   XR Lumbar Spine 2/3 Views    Narrative    2 views lumbar spine radiographs 7/25/2019 10:46 AM    History: Bilateral low back pain without sciatica, unspecified  chronicity     Comparison: None    Findings:    AP and views of the lumbar spine were obtained.    5  lumbar type vertebral bodies are assumed for the purpose of this  dictation.    There is no acute osseous abnormality.  Elongated L1 transverse  processes versus accessory ribs. Rudimentary disc S1-S2. Mild convex  left curvature of the thoracolumbar/lumbar spine.    There is multilevel degenerative changes of the lumbar spine, most  pronounced at L4-L5 with severe disc space loss. There is also lower  lumbar predominant facet arthropathy. 8 mm of anterolisthesis of L4 on  L5.      The visualized bowel gas pattern is non-obstructive.      Impression    Impression:  1.  No acute osseous abnormality.  2.  Multilevel degenerative changes most pronounced at L4-L5. A  millimeters of anterolisthesis of L4 on L5.  3. Transitional lumbosacral anatomy, as above.    KHLOE GOMES MD (Joe)       ASSESSMENT / PLAN:   Bree was seen today for wellness visit and physical.    Diagnoses and all  orders for this visit:    Encounter for Medicare annual wellness exam  -     CBC with platelets    Bilateral low back pain without sciatica, unspecified chronicity  -     XR Lumbar Spine 2/3 Views; Future  -     PHYSICAL THERAPY REFERRAL; Future  OTC tylenol arthritis as needed  and call prn if symptoms persist or worsen.    Anemia, unspecified type  -     Iron and iron binding capacity; Future  -     Ferritin; Future  -     CBC with platelets; Future  -     Reticulocyte count; Future  -     Vitamin B12; Future  -     Folate; Future  -     Occult blood stool; Future  Continue current medication regimen unchanged.    Hypertension with goal blood pressure less than 140/90  -     losartan (COZAAR) 50 MG tablet; Take 1 tablet (50 mg) by mouth daily  Continue current medication regimen unchanged.    Hyperlipidemia with target LDL less than 130  -     lovastatin (MEVACOR) 20 MG tablet; Take 1 tablet (20 mg) by mouth At Bedtime  -     TSH with free T4 reflex; Future  -     TSH with free T4 reflex; Future  Continue current medication regimen unchanged.    Screening for disorder of blood and blood-forming organs  -     CBC with platelets    Elevated TSH  -     TSH with free T4 reflex; Future  -     TSH with free T4 reflex; Future    Screening for thyroid disorder  -     TSH with free T4 reflex; Future    PLAN:   1.   Symptomatic therapy suggested: OTC tylenol arthritis as needed  and call prn if symptoms persist or worsen.  2.  Orders Placed This Encounter   Medications     lovastatin (MEVACOR) 20 MG tablet     Sig: Take 1 tablet (20 mg) by mouth At Bedtime     Dispense:  90 tablet     Refill:  3     losartan (COZAAR) 50 MG tablet     Sig: Take 1 tablet (50 mg) by mouth daily     Dispense:  90 tablet     Refill:  3     Orders Placed This Encounter   Procedures     XR Lumbar Spine 2/3 Views     CBC with platelets     TSH with free T4 reflex     3. Patient needs to follow up in if no improvement,or sooner if worsening of  "symptoms or other symptoms develop.  FUTURE LABS:       - Schedule a non-fasting blood draw 1 month to recheck thyroid   Lumbar spine xray   Will follow up and/or notify patient on results via phone or mail to determine further need for followup  Consider physical therapy   Follow up in 1 year.  Follow up office visit in one year for annual health maintenance exam, sooner PRN.        End of Life Planning:  Patient currently has an advanced directive: No.  I have verified the patient's ablity to prepare an advanced directive/make health care decisions.  Literature was provided to assist patient in preparing an advanced directive.    COUNSELING:  Reviewed preventive health counseling, as reflected in patient instructions  Special attention given to:       Regular exercise       Healthy diet/nutrition       Osteoporosis Prevention/Bone Health       Colon cancer screening       Hepatitis C screening       The 10-year ASCVD risk score (Arpan VIRAMONTES Jr., et al., 2013) is: 8.8%    Values used to calculate the score:      Age: 69 years      Sex: Female      Is Non- : No      Diabetic: No      Tobacco smoker: No      Systolic Blood Pressure: 111 mmHg      Is BP treated: Yes      HDL Cholesterol: 67 mg/dL      Total Cholesterol: 169 mg/dL       Advanced Planning     Estimated body mass index is 26.23 kg/m  as calculated from the following:    Height as of 11/9/18: 1.403 m (4' 7.25\").    Weight as of 5/31/19: 51.7 kg (113 lb 14.4 oz).         reports that she has never smoked. She has never used smokeless tobacco.      Appropriate preventive services were discussed with this patient, including applicable screening as appropriate for cardiovascular disease, diabetes, osteopenia/osteoporosis, and glaucoma.  As appropriate for age/gender, discussed screening for colorectal cancer, prostate cancer, breast cancer, and cervical cancer. Checklist reviewing preventive services available has been given to the " patient.    Reviewed patients plan of care and provided an AVS. The Intermediate Care Plan ( asthma action plan, low back pain action plan, and migraine action plan) for Bree meets the Care Plan requirement. This Care Plan has been established and reviewed with the Patient.    Counseling Resources:  ATP IV Guidelines  Pooled Cohorts Equation Calculator  Breast Cancer Risk Calculator  FRAX Risk Assessment  ICSI Preventive Guidelines  Dietary Guidelines for Americans, 2010  USDA's MyPlate  ASA Prophylaxis  Lung CA Screening    AYANNA Bowser CNP  [facility]

## 2019-07-25 NOTE — NURSING NOTE
"Chief Complaint   Patient presents with     Wellness Visit     Physical       Initial /70 (BP Location: Right arm, Patient Position: Sitting, Cuff Size: Adult Regular)   Pulse 72   Temp 97.8  F (36.6  C) (Temporal)   Ht 1.416 m (4' 7.75\")   Wt 51 kg (112 lb 6.4 oz)   SpO2 100%   Breastfeeding? No   BMI 25.43 kg/m   Estimated body mass index is 25.43 kg/m  as calculated from the following:    Height as of this encounter: 1.416 m (4' 7.75\").    Weight as of this encounter: 51 kg (112 lb 6.4 oz).  Medication Reconciliation: complete      TG Kang      "

## 2019-07-27 ENCOUNTER — THERAPY VISIT (OUTPATIENT)
Dept: PHYSICAL THERAPY | Facility: CLINIC | Age: 69
End: 2019-07-27
Attending: NURSE PRACTITIONER
Payer: COMMERCIAL

## 2019-07-27 DIAGNOSIS — M54.50 BILATERAL LOW BACK PAIN WITHOUT SCIATICA, UNSPECIFIED CHRONICITY: ICD-10-CM

## 2019-07-27 DIAGNOSIS — M54.50 LOW BACK PAIN: ICD-10-CM

## 2019-07-27 PROCEDURE — 97162 PT EVAL MOD COMPLEX 30 MIN: CPT | Mod: GP | Performed by: PHYSICAL THERAPIST

## 2019-07-27 PROCEDURE — 97530 THERAPEUTIC ACTIVITIES: CPT | Mod: GP | Performed by: PHYSICAL THERAPIST

## 2019-07-27 PROCEDURE — 97110 THERAPEUTIC EXERCISES: CPT | Mod: GP | Performed by: PHYSICAL THERAPIST

## 2019-07-27 NOTE — LETTER
[facility]  [address] [facility]  [place] [id number] [phone]    2019    Re: Bree Carrillo   :   1950  MRN:  [id number]   REFERRING PHYSICIAN:   Reina Corley    [facility]    Date of Initial Evaluation:  2019  Visits:  Rxs Used: 1  Reason for Referral:     Bilateral low back pain without sciatica, unspecified chronicity  Low back pain    EVALUATION SUMMARY    [facility]  Subjective:  [facility]  The history is provided by the patient. No  was used (although need to be very clear - English is not first language).   Bree Carrillo being seen for LBP.   Date of Onset: 2019. Where condition occurred: for unknown reasons.Problem occurred: insidious onset  and reported as 6/10 on pain scale. General health as reported by patient is good. Pertinent medical history includes:  High blood pressure.    Surgeries: did not report.  Current medications:  High blood pressure medication.   Primary job tasks include:  Lifting/carrying. Other job/home tasks details: does cooking and housework.  Pain is described as aching and is intermittent. Pain is worse during the day. Since onset symptoms are unchanged. Special tests:  X-ray (significant DDD at L4-L5).     Patient is homemaker.   Barriers include:  None as reported by patient.  Red flags:  None as reported by patient.  Type of problem:  Lumbar   Condition occurred with:  Degenerative joint disease. This is a new condition    Patient reports pain:  Central lumbar spine. Radiates to:  No radiation. Associated symptoms:  Loss of motion/stiffness. Symptoms are exacerbated by walking, standing, lifting and certain positions and relieved by rest.  Objective:  Standing Alignment:    Lumbar:  Lordosis incr  Gait:    Assistive Devices:   None  Deviations:  Lumbar:  Trunk flexion  Lumbar/SI Evaluation  ROM:    AROM Lumbar:   Flexion:          Can toe touch  Ext:                    30% +   Side Bend:        Left:     Right:   Rotation:           Left:  50%    Right:  50%  Side Glide:        Left:     Right:   Lumbar Myotomes:  normal (for age and gender)  Lumbar DTR's:  not assessed  Cord Signs:  not assessed  Lumbar Dermtomes:  not assessed  Neural Tension/Mobility:    Left side:SLR; SLR w/DF or Slump  negative.   Right side:   SLR w/DF; Slump or SLR  negative.   Lumbar Palpation:    Tenderness present at Left:    Erector Spinae and Hip Flexors  Tenderness present at Right: Erector Spinae and Hip Flexors               Musculoskeletal:        Back:          Assessment/Plan:    Patient is a 69 year old female with lumbar complaints.    Patient has the following significant findings with corresponding treatment plan.                Diagnosis 1:  Low Back pain  Pain -  self management, education and home program  Decreased ROM/flexibility - therapeutic exercise and home program  Decreased strength - therapeutic exercise, therapeutic activities and home program  Impaired gait - home program  Impaired muscle performance - neuro re-education and home program  Decreased function - home program    Therapy Evaluation Codes:   1) History comprised of:   Personal factors that impact the plan of care:      None.    Comorbidity factors that impact the plan of care are:      High blood pressure.     Medications impacting care: High blood pressure.  2) Examination of Body Systems comprised of:   Body structures and functions that impact the plan of care:      Lumbar spine.   Activity limitations that impact the plan of care are:      Bending, Cooking, Lifting, Standing, Walking and Sleeping.  3) Clinical presentation characteristics are:   Evolving/Changing.  4) Decision-Making    Moderate complexity using standardized patient assessment instrument and/or  measureable assessment of functional outcome.  Cumulative Therapy Evaluation is: Moderate complexity.  Previous and current functional limitations:  (See Goal Flow Sheet for this information)    Short term and Long term goals: (See Goal Flow Sheet for this information)   Communication ability:  Patient appears to be able to clearly communicate and understand verbal and written communication and follow directions correctly.  Treatment Explanation - The following has been discussed with the patient:   RX ordered/plan of care  Anticipated outcomes  Possible risks and side effects  This patient would benefit from PT intervention to resume normal activities.   Rehab potential is good.    Frequency:  1 X week, once daily  Duration:  for 6 weeks  Discharge Plan:  Achieve all LTG.  Independent in home treatment program.  Reach maximal therapeutic benefit.      Thank you for your referral.    INQUIRIES  Therapist: Barb Butcher    [facility] PH[facility]ICAL THER[facility]  [address] [place] [address]. [id number]  [facility] [address]  Phone: [phone]  Fax: [phone]

## 2019-07-27 NOTE — PROGRESS NOTES
[facility] Initial Evaluation  Subjective:  [facility] Initial Evaluation    The history is provided by the patient. No  was used (although need to be very clear - English is not first language).   Bree Carrillo being seen for LBP.   Date of Onset: July 2019. Where condition occurred: for unknown reasons.Problem occurred: insidious onset  and reported as 6/10 on pain scale. General health as reported by patient is good. Pertinent medical history includes:  High blood pressure.    Surgeries: did not report.  Current medications:  High blood pressure medication.   Primary job tasks include:  Lifting/carrying. Other job/home tasks details: does cooking and housework.  Pain is described as aching and is intermittent. Pain is worse during the day. Since onset symptoms are unchanged. Special tests:  X-ray (significant DDD at L4-L5).     Patient is homemaker.   Barriers include:  None as reported by patient.  Red flags:  None as reported by patient.  Type of problem:  Lumbar   Condition occurred with:  Degenerative joint disease. This is a new condition    Patient reports pain:  Central lumbar spine. Radiates to:  No radiation. Associated symptoms:  Loss of motion/stiffness. Symptoms are exacerbated by walking, standing, lifting and certain positions and relieved by rest.                      Objective:  Standing Alignment:        Lumbar:  Lordosis incr            Gait:    Assistive Devices:  None  Deviations:  Lumbar:  Trunk flexion               Lumbar/SI Evaluation  ROM:    AROM Lumbar:   Flexion:          Can toe touch  Ext:                    30% +   Side Bend:        Left:     Right:   Rotation:           Left:  50%    Right:  50%  Side Glide:        Left:     Right:           Lumbar Myotomes:  normal (for age and gender)            Lumbar DTR's:  not assessed      Cord Signs:  not assessed    Lumbar Dermtomes:  not assessed                Neural  Tension/Mobility:      Left side:SLR; SLR w/DF or Slump  negative.     Right side:   SLR w/DF; Slump or SLR  negative.   Lumbar Palpation:    Tenderness present at Left:    Erector Spinae and Hip Flexors  Tenderness present at Right: Erector Spinae and Hip Flexors                                                                                          Musculoskeletal:        Back:        ROS    Assessment/Plan:    Patient is a 69 year old female with lumbar complaints.    Patient has the following significant findings with corresponding treatment plan.                Diagnosis 1:  Low Back pain  Pain -  self management, education and home program  Decreased ROM/flexibility - therapeutic exercise and home program  Decreased strength - therapeutic exercise, therapeutic activities and home program  Impaired gait - home program  Impaired muscle performance - neuro re-education and home program  Decreased function - home program    Therapy Evaluation Codes:   1) History comprised of:   Personal factors that impact the plan of care:      None.    Comorbidity factors that impact the plan of care are:      High blood pressure.     Medications impacting care: High blood pressure.  2) Examination of Body Systems comprised of:   Body structures and functions that impact the plan of care:      Lumbar spine.   Activity limitations that impact the plan of care are:      Bending, Cooking, Lifting, Standing, Walking and Sleeping.  3) Clinical presentation characteristics are:   Evolving/Changing.  4) Decision-Making    Moderate complexity using standardized patient assessment instrument and/or measureable assessment of functional outcome.  Cumulative Therapy Evaluation is: Moderate complexity.    Previous and current functional limitations:  (See Goal Flow Sheet for this information)    Short term and Long term goals: (See Goal Flow Sheet for this information)     Communication ability:  Patient appears to be able to clearly  communicate and understand verbal and written communication and follow directions correctly.  Treatment Explanation - The following has been discussed with the patient:   RX ordered/plan of care  Anticipated outcomes  Possible risks and side effects  This patient would benefit from PT intervention to resume normal activities.   Rehab potential is good.    Frequency:  1 X week, once daily  Duration:  for 6 weeks  Discharge Plan:  Achieve all LTG.  Independent in home treatment program.  Reach maximal therapeutic benefit.    Please refer to the daily flowsheet for treatment today, total treatment time and time spent performing 1:1 timed codes.

## 2019-08-02 ENCOUNTER — THERAPY VISIT (OUTPATIENT)
Dept: PHYSICAL THERAPY | Facility: CLINIC | Age: 69
End: 2019-08-02
Payer: COMMERCIAL

## 2019-08-02 DIAGNOSIS — M54.5 LOW BACK PAIN, UNSPECIFIED BACK PAIN LATERALITY, UNSPECIFIED CHRONICITY, WITH SCIATICA PRESENCE UNSPECIFIED: ICD-10-CM

## 2019-08-02 PROCEDURE — 97530 THERAPEUTIC ACTIVITIES: CPT | Mod: GP | Performed by: PHYSICAL THERAPIST

## 2019-08-02 PROCEDURE — 97110 THERAPEUTIC EXERCISES: CPT | Mod: GP | Performed by: PHYSICAL THERAPIST

## 2019-08-02 PROCEDURE — 97140 MANUAL THERAPY 1/> REGIONS: CPT | Mod: GP | Performed by: PHYSICAL THERAPIST

## 2019-08-07 ENCOUNTER — TELEPHONE (OUTPATIENT)
Dept: PEDIATRICS | Facility: CLINIC | Age: 69
End: 2019-08-07

## 2019-08-07 NOTE — TELEPHONE ENCOUNTER
[facility] Call Center    Phone Message    May a detailed message be left on voicemail: yes    Reason for Call:  Patient request a call back to discuss results of Occult blood stool (Order: [id number]) - 7/25/2019 . Please advise.      Action Taken: Message routed to:  Primary Care p [id number]

## 2019-08-07 NOTE — TELEPHONE ENCOUNTER
Patient states her  dropped off a stool sample to our lab on Friday at 2pm, but it is still marked as future.   Called our lab and they state they do not see it or see any notes about it. Called patient back to verify it wasn't brought to the hospital and she states she waited in the car in our clinic parking lot and she had dated it and put the time on it. I apologized. She has to do blood work soon anyway, so she will bring in a new sample.    Dinorah Jasso RN

## 2019-08-08 ENCOUNTER — THERAPY VISIT (OUTPATIENT)
Dept: PHYSICAL THERAPY | Facility: CLINIC | Age: 69
End: 2019-08-08
Payer: COMMERCIAL

## 2019-08-08 DIAGNOSIS — M54.5 LOW BACK PAIN, UNSPECIFIED BACK PAIN LATERALITY, UNSPECIFIED CHRONICITY, WITH SCIATICA PRESENCE UNSPECIFIED: ICD-10-CM

## 2019-08-08 PROCEDURE — 97110 THERAPEUTIC EXERCISES: CPT | Mod: GP | Performed by: PHYSICAL THERAPIST

## 2019-08-08 PROCEDURE — 97112 NEUROMUSCULAR REEDUCATION: CPT | Mod: GP | Performed by: PHYSICAL THERAPIST

## 2019-08-08 PROCEDURE — 97140 MANUAL THERAPY 1/> REGIONS: CPT | Mod: GP | Performed by: PHYSICAL THERAPIST

## 2019-08-09 DIAGNOSIS — E78.5 HYPERLIPIDEMIA WITH TARGET LDL LESS THAN 130: ICD-10-CM

## 2019-08-09 DIAGNOSIS — Z13.29 SCREENING FOR THYROID DISORDER: ICD-10-CM

## 2019-08-09 DIAGNOSIS — R79.89 ELEVATED TSH: ICD-10-CM

## 2019-08-09 DIAGNOSIS — D64.9 ANEMIA, UNSPECIFIED TYPE: ICD-10-CM

## 2019-08-09 LAB
ERYTHROCYTE [DISTWIDTH] IN BLOOD BY AUTOMATED COUNT: 12.5 % (ref 10–15)
FERRITIN SERPL-MCNC: 54 NG/ML (ref 8–252)
FOLATE SERPL-MCNC: 27.2 NG/ML
HCT VFR BLD AUTO: 36.2 % (ref 35–47)
HGB BLD-MCNC: 11.9 G/DL (ref 11.7–15.7)
IRON SATN MFR SERPL: 22 % (ref 15–46)
IRON SERPL-MCNC: 69 UG/DL (ref 35–180)
MCH RBC QN AUTO: 29.3 PG (ref 26.5–33)
MCHC RBC AUTO-ENTMCNC: 32.9 G/DL (ref 31.5–36.5)
MCV RBC AUTO: 89 FL (ref 78–100)
PLATELET # BLD AUTO: 233 10E9/L (ref 150–450)
RBC # BLD AUTO: 4.06 10E12/L (ref 3.8–5.2)
RETICS # AUTO: 52.8 10E9/L (ref 25–95)
RETICS/RBC NFR AUTO: 1.3 % (ref 0.5–2)
T4 FREE SERPL-MCNC: 1.07 NG/DL (ref 0.76–1.46)
TIBC SERPL-MCNC: 310 UG/DL (ref 240–430)
TSH SERPL DL<=0.005 MIU/L-ACNC: 4.13 MU/L (ref 0.4–4)
VIT B12 SERPL-MCNC: 368 PG/ML (ref 193–986)
WBC # BLD AUTO: 4.6 10E9/L (ref 4–11)

## 2019-08-09 PROCEDURE — 83540 ASSAY OF IRON: CPT | Performed by: NURSE PRACTITIONER

## 2019-08-09 PROCEDURE — 82607 VITAMIN B-12: CPT | Performed by: NURSE PRACTITIONER

## 2019-08-09 PROCEDURE — 82728 ASSAY OF FERRITIN: CPT | Performed by: NURSE PRACTITIONER

## 2019-08-09 PROCEDURE — 85045 AUTOMATED RETICULOCYTE COUNT: CPT | Performed by: NURSE PRACTITIONER

## 2019-08-09 PROCEDURE — 85027 COMPLETE CBC AUTOMATED: CPT | Performed by: NURSE PRACTITIONER

## 2019-08-09 PROCEDURE — 83550 IRON BINDING TEST: CPT | Performed by: NURSE PRACTITIONER

## 2019-08-09 PROCEDURE — 84439 ASSAY OF FREE THYROXINE: CPT | Performed by: NURSE PRACTITIONER

## 2019-08-09 PROCEDURE — 84443 ASSAY THYROID STIM HORMONE: CPT | Performed by: NURSE PRACTITIONER

## 2019-08-09 PROCEDURE — 82746 ASSAY OF FOLIC ACID SERUM: CPT | Performed by: NURSE PRACTITIONER

## 2019-08-09 PROCEDURE — 36415 COLL VENOUS BLD VENIPUNCTURE: CPT | Performed by: NURSE PRACTITIONER

## 2019-08-12 ENCOUNTER — TELEPHONE (OUTPATIENT)
Dept: PEDIATRICS | Facility: CLINIC | Age: 69
End: 2019-08-12

## 2019-08-12 NOTE — TELEPHONE ENCOUNTER
----- Message from Reina Corley, AYANNA CNP sent at 8/11/2019  7:23 PM CDT -----  Please call   All labs are normal except TSH is just slightly elevated and recheck again in a month to be sure completely normalized.   Reina Corley, NP, APRN CNP

## 2019-08-12 NOTE — TELEPHONE ENCOUNTER
Attempt # 1    Was call answered?  No.  Left message on home number to return call to clinic.  Jimbo TILLEY CMA

## 2019-08-13 DIAGNOSIS — D64.9 ANEMIA, UNSPECIFIED TYPE: ICD-10-CM

## 2019-08-13 LAB — HEMOCCULT STL QL: NEGATIVE

## 2019-08-13 PROCEDURE — 82272 OCCULT BLD FECES 1-3 TESTS: CPT | Performed by: NURSE PRACTITIONER

## 2019-08-13 NOTE — TELEPHONE ENCOUNTER
Called patient on home number to inform. Patient states understanding and agrees to plan. Scheduled lab appointment on 9/16/19.  Jimbo TILLEY CMA

## 2019-08-19 ENCOUNTER — TELEPHONE (OUTPATIENT)
Dept: PEDIATRICS | Facility: CLINIC | Age: 69
End: 2019-08-19

## 2019-08-19 NOTE — TELEPHONE ENCOUNTER
[facility] Call Center    Phone Message    May a detailed message be left on voicemail: yes    Reason for Call: Requesting Results   Name/type of test: Stool sample  Date of test: 08/13/2019  Was test done at a location other than [facility] (Please fill in the location if not [facility])?: No      Action Taken: Message routed to:  Primary Care p [id number]

## 2019-09-11 ENCOUNTER — TELEPHONE (OUTPATIENT)
Dept: PEDIATRICS | Facility: CLINIC | Age: 69
End: 2019-09-11

## 2019-09-11 NOTE — TELEPHONE ENCOUNTER
[facility] Call Center    Phone Message    May a detailed message be left on voicemail: no    Reason for Call: Medication Refill Request    Has the patient contacted the pharmacy for the refill? YES    LOVASTATIN 20mg       [id number] - [place], [place] - [address], [place] AT [facility]  Action Taken: Message routed to:  Primary Care p [id number]

## 2019-09-11 NOTE — TELEPHONE ENCOUNTER
Lovastatin was sent on 7/25 with 3 refills & receipt so should have refills remaining. Called patient -she hasn't called the pharmacy yet and will do so and call us back if they can't find it.   Dinorah Jasso RN

## 2019-09-11 NOTE — LETTER
September 11, 2019      Bree Carrillo  [address]  [place] [address]              Dear Bree,    All labs are normal except TSH is just slightly elevated and recheck again in a month to be sure completely normalized.     Results for orders placed or performed in visit on 08/13/19   Occult blood stool   Result Value Ref Range    Occult Blood Negative NEG^Negative       Sincerely,      Reina Corley CNP

## 2019-09-16 DIAGNOSIS — R79.89 ELEVATED TSH: ICD-10-CM

## 2019-09-16 DIAGNOSIS — E78.5 HYPERLIPIDEMIA WITH TARGET LDL LESS THAN 130: ICD-10-CM

## 2019-09-16 LAB — TSH SERPL DL<=0.005 MIU/L-ACNC: 3.84 MU/L (ref 0.4–4)

## 2019-09-16 PROCEDURE — 84443 ASSAY THYROID STIM HORMONE: CPT | Performed by: NURSE PRACTITIONER

## 2019-09-16 PROCEDURE — 36415 COLL VENOUS BLD VENIPUNCTURE: CPT | Performed by: NURSE PRACTITIONER

## 2019-10-21 ENCOUNTER — OFFICE VISIT (OUTPATIENT)
Dept: OPHTHALMOLOGY | Facility: CLINIC | Age: 69
End: 2019-10-21
Payer: COMMERCIAL

## 2019-10-21 DIAGNOSIS — H25.13 AGE-RELATED NUCLEAR CATARACT OF BOTH EYES: Primary | ICD-10-CM

## 2019-10-21 PROCEDURE — 92014 COMPRE OPH EXAM EST PT 1/>: CPT | Performed by: OPHTHALMOLOGY

## 2019-10-21 ASSESSMENT — VISUAL ACUITY
OS_SC+: +
OS_SC: 20/30
OD_SC+: +
OD_SC: 20/30
METHOD: SNELLEN - LINEAR

## 2019-10-21 ASSESSMENT — REFRACTION_MANIFEST
OD_SPHERE: +0.75
OS_ADD: +2.50
OD_CYLINDER: +0.25
OS_CYLINDER: SPHERE
OD_AXIS: 045
OD_ADD: +2.50
OS_SPHERE: +1.25

## 2019-10-21 ASSESSMENT — TONOMETRY
IOP_METHOD: TONOPEN
OS_IOP_MMHG: 15
OD_IOP_MMHG: 14

## 2019-10-21 ASSESSMENT — CONF VISUAL FIELD
OD_NORMAL: 1
METHOD: COUNTING FINGERS
OS_NORMAL: 1

## 2019-10-21 ASSESSMENT — CUP TO DISC RATIO
OD_RATIO: 0.2
OS_RATIO: 0.3

## 2019-10-21 ASSESSMENT — EXTERNAL EXAM - RIGHT EYE: OD_EXAM: NORMAL

## 2019-10-21 ASSESSMENT — EXTERNAL EXAM - LEFT EYE: OS_EXAM: NORMAL

## 2019-10-21 ASSESSMENT — SLIT LAMP EXAM - LIDS
COMMENTS: NORMAL
COMMENTS: NORMAL

## 2019-10-21 NOTE — NURSING NOTE
Chief Complaints and History of Present Illnesses   Patient presents with     Annual Eye Exam      Chief Complaint(s) and History of Present Illness(es)     Annual Eye Exam     Laterality: both eyes    Onset: gradual    Quality: blurred vision    Context: distance vision    Treatments tried: no treatments              Comments     Patient here for annual eye exam  Distance vision is decreased both eyes.  Feels she needs glasses.  History of LASIK each eye   Eye meds: none  ABIEL Edmonds 10/21/2019 7:59 AM

## 2019-10-24 PROBLEM — M54.50 LOW BACK PAIN: Status: RESOLVED | Noted: 2019-07-27 | Resolved: 2019-10-24

## 2019-10-24 NOTE — PROGRESS NOTES
Discharge Note    Bree failed to follow up and current status is unknown.  Please see information below for last relevant information on current status.  Patient seen for 3 visits.    SUBJECTIVE  Subjective changes noted by patient:  Ann reports that she is better.  Not using tylenol but she wants to start using heat.  Overall her days are better but can sometimes have pain in morning.  Painfree today.   .  Current pain level is 0/10.     Previous pain level was   .   Changes in function:  Yes (See Goal flowsheet attached for changes in current functional level)  Adverse reaction to treatment or activity: None    OBJECTIVE  Changes noted in objective findings: Lumbar AROM: flex no loss, ext min loss + pain in B LB, B SG min loss and no increase in pain.  Attempt to progress to pelvic tilt in standing with difficulty coordinating at wall.       ASSESSMENT/PLAN  Diagnosis: LBP   Updated problem list and treatment plan:   Unknown at this time.   STG/LTGs have been met or progress has been made towards goals:  Yes, please see goal flowsheet for most current information  Assessment of Progress: current status is unknown.    Last current status: Pt is progressing well   Self Management Plans:  HEP  I have re-evaluated this patient and find that the nature, scope, duration and intensity of the therapy is appropriate for the medical condition of the patient.  Bree continues to require the following intervention to meet STG and LTG's:  HEP.    Recommendations:  Discharge with current home program.  Patient to follow up with MD as needed.    Please refer to the daily flowsheet for treatment today, total treatment time and time spent performing 1:1 timed codes.

## 2019-12-02 NOTE — PROGRESS NOTES
Assessment & Plan   Bree Carrillo is a 69 year old female who presents with:   Review of systems for the eyes was negative other than the pertinent positives and negatives noted in the HPI.    Age-related nuclear cataract of both eyes  - Good vision  - Observe  - If interested in [employer] could consider A+K's, iTrace, Pentacam, OCT in [place].    Hyperopia  - Rx per MR (glasses)    Return in 12 months for annual exam with A+K's      Attending Physician Attestation:  Complete documentation of historical and exam elements from today's encounter can be found in the full encounter summary report (not reduplicated in this progress note).  I personally obtained the chief complaint(s) and history of present illness.  I confirmed and edited as necessary the review of systems, past medical/surgical history, family history, social history, and examination findings as documented by others; and I examined the patient myself.  I personally reviewed the relevant tests, images, and reports as documented above.  I formulated and edited as necessary the assessment and plan and discussed the findings and management plan with the patient and family. - Kenny Arcos MD

## 2019-12-09 ENCOUNTER — TELEPHONE (OUTPATIENT)
Dept: ENDOCRINOLOGY | Facility: CLINIC | Age: 69
End: 2019-12-09

## 2019-12-09 NOTE — TELEPHONE ENCOUNTER
[facility] Call Center    Phone Message    May a detailed message be left on voicemail: yes    Reason for Call: Other: patient needs a follow up for 6 mths but nothing avail until march for endo - non diabetic patients. patient wants to be seen sooner. please call to advise. patient was added to wait list and also scheduled for soonest return/endo spot in march. ok to leave vmx.,     Action Taken: Message routed to:  Adult Clinics: Endocrinology p [id number]

## 2019-12-09 NOTE — TELEPHONE ENCOUNTER
Patient was to scheduled for 11/18/19 appointment but cancelled. Last office visit on 11/9/18.     Per 11/9/18 progress note from Dr. Lundberg:    # Osteoporosis  Compared to 2015, 2017 bone density showed lumbar slight improvement from a -2.7 up to -2.4.  However left hip decreased bone density, which she has had pain recently.  Prolia seems working, we will modify calcium supplement and continue Prolia for now.      - Continue current Prolia (#5) will be within this year  - Calcium citrate plus D petite (elemental Ca 800 mg, Vitamin D 1000IU), considered the size of pills  - Vitamin D level today  - BMP (Ca) today     #History of subclinical hypothyroidism   will check thyroid function today     - TSH, free T4     RTC with me in 1 year.         Contacted patient to review. Patient reports that she had to cancel because she was traveling. Patient's last Prolia was 5/31/19 and she did not get her next injection in November.    Writer advised patient that note will be sent to Dr. Lundberg to determine if she would like to see patient before scheduling Prolia or ok to proceed with Prolia now and see patient in March.    Will contact patient next week when Dr. Lundberg returns to clinic. Patient verbalizes understanding and agrees to plan.       Yoselin Meza, RN  Endocrine Care Coordinator  [facility]

## 2019-12-18 NOTE — TELEPHONE ENCOUNTER
Patient calling back as she hasnt heard anything back re when to schedule appt.Pt informed doesn't appear doctor is even back in office yet.  Clinic states messages are being returned once reviewed by Doctor.   Please leave vmx if no answer.

## 2019-12-20 DIAGNOSIS — M81.0 AGE RELATED OSTEOPOROSIS, UNSPECIFIED PATHOLOGICAL FRACTURE PRESENCE: Primary | ICD-10-CM

## 2019-12-23 NOTE — TELEPHONE ENCOUNTER
I spoke with pt and scheduled her Dexa scan for March 2020.     Saad Parisi  Procedure , [place]  Peds Specialty and Adult Endocrinology

## 2019-12-24 ENCOUNTER — INFUSION THERAPY VISIT (OUTPATIENT)
Dept: INFUSION THERAPY | Facility: CLINIC | Age: 69
End: 2019-12-24
Payer: COMMERCIAL

## 2019-12-24 VITALS
DIASTOLIC BLOOD PRESSURE: 72 MMHG | HEART RATE: 75 BPM | WEIGHT: 117.7 LBS | HEIGHT: 56 IN | RESPIRATION RATE: 16 BRPM | OXYGEN SATURATION: 98 % | SYSTOLIC BLOOD PRESSURE: 158 MMHG | BODY MASS INDEX: 26.48 KG/M2 | TEMPERATURE: 97.7 F

## 2019-12-24 DIAGNOSIS — M81.0 SENILE OSTEOPOROSIS: ICD-10-CM

## 2019-12-24 DIAGNOSIS — T50.995S ADVERSE EFFECT OF OTHER DRUGS, MEDICAMENTS AND BIOLOGICAL SUBSTANCES, SEQUELA: ICD-10-CM

## 2019-12-24 DIAGNOSIS — T50.995S ADVERSE EFFECT OF OTHER DRUGS, MEDICAMENTS AND BIOLOGICAL SUBSTANCES, SEQUELA: Primary | ICD-10-CM

## 2019-12-24 DIAGNOSIS — M81.0 SENILE OSTEOPOROSIS: Primary | ICD-10-CM

## 2019-12-24 LAB
CALCIUM SERPL-MCNC: 9.3 MG/DL (ref 8.5–10.1)
CREAT SERPL-MCNC: 0.7 MG/DL (ref 0.52–1.04)
GFR SERPL CREATININE-BSD FRML MDRD: 88 ML/MIN/{1.73_M2}
PHOSPHATE SERPL-MCNC: 3.7 MG/DL (ref 2.5–4.5)

## 2019-12-24 PROCEDURE — 96372 THER/PROPH/DIAG INJ SC/IM: CPT | Performed by: INTERNAL MEDICINE

## 2019-12-24 PROCEDURE — 82310 ASSAY OF CALCIUM: CPT | Performed by: INTERNAL MEDICINE

## 2019-12-24 PROCEDURE — 36415 COLL VENOUS BLD VENIPUNCTURE: CPT | Performed by: INTERNAL MEDICINE

## 2019-12-24 PROCEDURE — 84100 ASSAY OF PHOSPHORUS: CPT | Performed by: INTERNAL MEDICINE

## 2019-12-24 PROCEDURE — 82565 ASSAY OF CREATININE: CPT | Performed by: INTERNAL MEDICINE

## 2019-12-24 PROCEDURE — 99207 ZZC NO CHARGE NURSE ONLY: CPT

## 2019-12-24 ASSESSMENT — PAIN SCALES - GENERAL: PAINLEVEL: NO PAIN (0)

## 2019-12-24 ASSESSMENT — MIFFLIN-ST. JEOR: SCORE: 907.88

## 2019-12-24 NOTE — PROGRESS NOTES
Infusion Nursing Note:  Bree Carrillo presents today for prolia injection.    Patient seen by provider today: No   present during visit today: Not Applicable.    Note: Patient assessed by Sara CHRIS RN prior to receiving injection.    Intravenous Access:  Labs drawn without difficulty.    Treatment Conditions:  Lab Results   Component Value Date     07/25/2019                   Lab Results   Component Value Date    POTASSIUM 3.8 07/25/2019           No results found for: MAG         Lab Results   Component Value Date    CR 0.70 12/24/2019                   Lab Results   Component Value Date    DEVAN 9.3 12/24/2019                Lab Results   Component Value Date    BILITOTAL 0.4 07/25/2019           Lab Results   Component Value Date    ALBUMIN 3.4 07/25/2019                    Lab Results   Component Value Date    ALT 22 07/25/2019           Lab Results   Component Value Date    AST 17 07/25/2019       Results reviewed, labs MET treatment parameters, ok to proceed with treatment.      Post Infusion Assessment:  Patient tolerated injection without incident.  Site patent and intact, free from redness, edema or discomfort.       Discharge Plan:   Patient discharged in stable condition accompanied by: self.  Departure Mode: Ambulatory.    Viji Hernandez LPN

## 2020-01-06 ENCOUNTER — TELEPHONE (OUTPATIENT)
Dept: ENDOCRINOLOGY | Facility: CLINIC | Age: 70
End: 2020-01-06

## 2020-01-06 NOTE — TELEPHONE ENCOUNTER
Per 12/26/19 result note from Dr. Lundberg (see lab tab for details):    Notes recorded by Deanna Lundberg MD on 12/26/2019 at 11:40 AM CST  Results are within normal limits.   Deanna Lundberg MD 12/26/2019 11:40 AM        Patient advised. Patient verbalizes understanding. Patient requested that [employer] set up text be sent to [phone]. Link sent to patient via DTVCast set up.        Yoselin Meza RN  Endocrine Care Coordinator  [facility]

## 2020-01-06 NOTE — TELEPHONE ENCOUNTER
[facility] Call Center    Phone Message    May a detailed message be left on voicemail: yes    Reason for Call: Requesting Results   Name/type of test: Lab Results  Date of test: 12/24/2019  Was test done at a location other than [facility] (Please fill in the location if not [facility])?: No      Action Taken: Message routed to:  Adult Clinics: Endocrinology p [id number]

## 2020-02-11 ENCOUNTER — TELEPHONE (OUTPATIENT)
Dept: PEDIATRICS | Facility: CLINIC | Age: 70
End: 2020-02-11

## 2020-02-11 DIAGNOSIS — Z12.31 ENCOUNTER FOR SCREENING MAMMOGRAM FOR BREAST CANCER: Primary | ICD-10-CM

## 2020-02-11 NOTE — TELEPHONE ENCOUNTER
Patient denies breast pain, lumps or concerns.  She is aware of the increased cost of the 3D and states her insurance covers it.   Ordered mammogram and she will call back in June to schedule. Patient verbalized understanding.    Dinorah Jasso RN

## 2020-02-11 NOTE — TELEPHONE ENCOUNTER
[facility] Call Center    Phone Message    May a detailed message be left on voicemail: yes     Reason for Call: Order(s): Other:   Reason for requested: 3D mammogram  Date needed: July  Provider name: Senait Corley    Please call pt once order is placed.    Action Taken: Message routed to:  Primary Care p [id number]    Travel Screening: Not Applicable

## 2020-02-18 ENCOUNTER — DOCUMENTATION ONLY (OUTPATIENT)
Dept: LAB | Facility: CLINIC | Age: 70
End: 2020-02-18

## 2020-02-18 DIAGNOSIS — I10 HYPERTENSION WITH GOAL BLOOD PRESSURE LESS THAN 140/90: ICD-10-CM

## 2020-02-18 DIAGNOSIS — D50.9 IRON DEFICIENCY ANEMIA, UNSPECIFIED IRON DEFICIENCY ANEMIA TYPE: ICD-10-CM

## 2020-02-18 DIAGNOSIS — E55.9 VITAMIN D DEFICIENCY: ICD-10-CM

## 2020-02-18 DIAGNOSIS — Z00.00 ROUTINE GENERAL MEDICAL EXAMINATION AT A HEALTH CARE FACILITY: Primary | ICD-10-CM

## 2020-02-18 DIAGNOSIS — E78.5 HYPERLIPIDEMIA WITH TARGET LDL LESS THAN 130: ICD-10-CM

## 2020-02-21 DIAGNOSIS — I10 HYPERTENSION WITH GOAL BLOOD PRESSURE LESS THAN 140/90: ICD-10-CM

## 2020-02-21 DIAGNOSIS — D50.9 IRON DEFICIENCY ANEMIA, UNSPECIFIED IRON DEFICIENCY ANEMIA TYPE: ICD-10-CM

## 2020-02-21 DIAGNOSIS — E55.9 VITAMIN D DEFICIENCY: ICD-10-CM

## 2020-02-21 DIAGNOSIS — E78.5 HYPERLIPIDEMIA WITH TARGET LDL LESS THAN 130: ICD-10-CM

## 2020-02-21 DIAGNOSIS — Z00.00 ROUTINE GENERAL MEDICAL EXAMINATION AT A HEALTH CARE FACILITY: ICD-10-CM

## 2020-02-21 LAB
ALBUMIN SERPL-MCNC: 3.7 G/DL (ref 3.4–5)
ALP SERPL-CCNC: 76 U/L (ref 40–150)
ALT SERPL W P-5'-P-CCNC: 19 U/L (ref 0–50)
ANION GAP SERPL CALCULATED.3IONS-SCNC: 6 MMOL/L (ref 3–14)
AST SERPL W P-5'-P-CCNC: 16 U/L (ref 0–45)
BILIRUB SERPL-MCNC: 0.4 MG/DL (ref 0.2–1.3)
BUN SERPL-MCNC: 22 MG/DL (ref 7–30)
CALCIUM SERPL-MCNC: 9.3 MG/DL (ref 8.5–10.1)
CHLORIDE SERPL-SCNC: 107 MMOL/L (ref 94–109)
CHOLEST SERPL-MCNC: 214 MG/DL
CO2 SERPL-SCNC: 28 MMOL/L (ref 20–32)
CREAT SERPL-MCNC: 0.81 MG/DL (ref 0.52–1.04)
CREAT UR-MCNC: 87 MG/DL
DEPRECATED CALCIDIOL+CALCIFEROL SERPL-MC: 33 UG/L (ref 20–75)
ERYTHROCYTE [DISTWIDTH] IN BLOOD BY AUTOMATED COUNT: 12.4 % (ref 10–15)
GFR SERPL CREATININE-BSD FRML MDRD: 73 ML/MIN/{1.73_M2}
GLUCOSE SERPL-MCNC: 93 MG/DL (ref 70–99)
HCT VFR BLD AUTO: 38 % (ref 35–47)
HDLC SERPL-MCNC: 80 MG/DL
HGB BLD-MCNC: 12.1 G/DL (ref 11.7–15.7)
LDLC SERPL CALC-MCNC: 111 MG/DL
MCH RBC QN AUTO: 29 PG (ref 26.5–33)
MCHC RBC AUTO-ENTMCNC: 31.8 G/DL (ref 31.5–36.5)
MCV RBC AUTO: 91 FL (ref 78–100)
MICROALBUMIN UR-MCNC: 11 MG/L
MICROALBUMIN/CREAT UR: 13.13 MG/G CR (ref 0–25)
NONHDLC SERPL-MCNC: 134 MG/DL
PLATELET # BLD AUTO: 253 10E9/L (ref 150–450)
POTASSIUM SERPL-SCNC: 4.2 MMOL/L (ref 3.4–5.3)
PROT SERPL-MCNC: 7.8 G/DL (ref 6.8–8.8)
RBC # BLD AUTO: 4.17 10E12/L (ref 3.8–5.2)
SODIUM SERPL-SCNC: 141 MMOL/L (ref 133–144)
T4 FREE SERPL-MCNC: 1.05 NG/DL (ref 0.76–1.46)
TRIGL SERPL-MCNC: 113 MG/DL
TSH SERPL DL<=0.005 MIU/L-ACNC: 4.42 MU/L (ref 0.4–4)
WBC # BLD AUTO: 4.9 10E9/L (ref 4–11)

## 2020-02-21 PROCEDURE — 84439 ASSAY OF FREE THYROXINE: CPT | Performed by: NURSE PRACTITIONER

## 2020-02-21 PROCEDURE — 80053 COMPREHEN METABOLIC PANEL: CPT | Performed by: NURSE PRACTITIONER

## 2020-02-21 PROCEDURE — 80061 LIPID PANEL: CPT | Performed by: NURSE PRACTITIONER

## 2020-02-21 PROCEDURE — 36415 COLL VENOUS BLD VENIPUNCTURE: CPT | Performed by: NURSE PRACTITIONER

## 2020-02-21 PROCEDURE — 82043 UR ALBUMIN QUANTITATIVE: CPT | Performed by: NURSE PRACTITIONER

## 2020-02-21 PROCEDURE — 85027 COMPLETE CBC AUTOMATED: CPT | Performed by: NURSE PRACTITIONER

## 2020-02-21 PROCEDURE — 84443 ASSAY THYROID STIM HORMONE: CPT | Performed by: NURSE PRACTITIONER

## 2020-02-21 PROCEDURE — 82306 VITAMIN D 25 HYDROXY: CPT | Performed by: NURSE PRACTITIONER

## 2020-02-24 NOTE — RESULT ENCOUNTER NOTE
Lalita Carrillo,    Attached are your test results.  -Normal red blood cell (hgb) levels, normal white blood cell count and normal platelet levels.  -Cholesterol levels are at your goal levels.  ADVISE: continuing your medication, a regular exercise program with at least 150 minutes of aerobic exercise per week, and eating a low saturated fat/low carbohydrate diet.  Also, you should recheck this fasting cholesterol panel in 12 months.  -Liver and gallbladder tests are normal (ALT,AST, Alk phos, bilirubin), kidney function is normal (Cr, GFR), sodium is normal, potassium is normal, calcium is normal, glucose is normal.  -TSH (thyroid stimulating hormone) level indicates hypothyroidism (an underactive thyroid).  This can cause a number of symptoms including weight gain, fatigue, high cholesterol, constipation, hair loss, cold intolerance).  ADVISE:rechecking your TSH in 1 month.   -Vitamin D level is normal and getting 1000 IU daily in your diet or supplements is recommended.   -Microalbumin (urine protein) test is normal.  ADVISE: rechecking this annually.   Please contact us if you have any questions.    Reina Corley, CNP

## 2020-02-26 ENCOUNTER — TELEPHONE (OUTPATIENT)
Dept: PEDIATRICS | Facility: CLINIC | Age: 70
End: 2020-02-26

## 2020-02-26 NOTE — TELEPHONE ENCOUNTER
[facility] Call Center    Phone Message    May a detailed message be left on voicemail: yes     Reason for Call: Requesting Results   Name/type of test: 2-21  Date of test: labs    Was test done at a location other than [facility] (Please fill in the location if not [facility])?: No      Action Taken: Message routed to:  Primary Care p [id number]    Travel Screening: Not Applicable

## 2020-02-26 NOTE — TELEPHONE ENCOUNTER
Notified per Senait Corley CNP to check MyChart. If unable to figure it will rc. Gloria Padilla LPN

## 2020-03-16 ENCOUNTER — ANCILLARY PROCEDURE (OUTPATIENT)
Dept: BONE DENSITY | Facility: CLINIC | Age: 70
End: 2020-03-16
Attending: INTERNAL MEDICINE
Payer: COMMERCIAL

## 2020-03-16 DIAGNOSIS — M81.0 AGE RELATED OSTEOPOROSIS, UNSPECIFIED PATHOLOGICAL FRACTURE PRESENCE: ICD-10-CM

## 2020-03-16 PROCEDURE — 77080 DXA BONE DENSITY AXIAL: CPT | Performed by: RADIOLOGY

## 2020-03-19 ENCOUNTER — TELEPHONE (OUTPATIENT)
Dept: ENDOCRINOLOGY | Facility: CLINIC | Age: 70
End: 2020-03-19

## 2020-03-19 NOTE — TELEPHONE ENCOUNTER
I spoke with Bree this morning to do her COVID 19 screening questions prior to her appt with Dr. Lundberg tomorrow, but patient informed me that she would like to cancel her appointment instead of coming in due to COVID-19. I asked the patient if she would like to do a phone visit with Dr Lundberg, but she said that her insurance will not cover it.   Bree is wondering if Dr Lundberg would be able to send her DEXA scan results via eLamat and then she will call back at a later time to reschedule.     Saad Parisi  Procedure , [facility] Specialty and Adult Endocrinology

## 2020-04-01 ENCOUNTER — TELEPHONE (OUTPATIENT)
Dept: ENDOCRINOLOGY | Facility: CLINIC | Age: 70
End: 2020-04-01

## 2020-04-01 NOTE — TELEPHONE ENCOUNTER
[facility] Call Center    Phone Message    May a detailed message be left on voicemail: yes     Reason for Call: Other: pt looking for results of last tests, very hard to understand her, please advise with her     Action Taken: Message routed to:  Adult Clinics: Endocrinology p [id number]    Travel Screening: Not Applicable

## 2020-04-01 NOTE — TELEPHONE ENCOUNTER
Contacted patent to review. Patient is questioning what her DEXA results because she had to cancel her office visit due to COVID.      Mipsohart results sent by Dr. Lundberg as follows:    Dear Bree     Here is your results. Still osteoporosis.  We will go over on 3/20 visit.     Please call nursing line, if you are [facility] patient at [phone], if you are [facility] patient at [phone],  if you have any questions.     Please take care   Deanna Lundberg MD           Patient advised of results and recommendations from Dr. Lundberg. Patient verbalizes understanding. Offered patient Video or Phone visit. Patient would rather wait and have an in person office visit when COVID has calmed down.      Patient notes that she is taking calcium but she could not take as much as Dr. Lundberg recommended due to giving her an upset stomach. Patient notes that she takes 2 tabs daily of calcium plus D Dr. Lundberg recommended rather than 4 tabs, then the next day she takes 1,000 mg of a calcium gummy and then alternates daily.     Patient is wanting to know if she is improving, getting worse or stable.       Will review all with Dr. Lundberg.         Yoselin Meza, RN  Endocrine Care Coordinator  [facility]

## 2020-04-03 NOTE — TELEPHONE ENCOUNTER
Per Dr. Lundberg:    Difficult to compare due to different facilty and machine but in my personal review, in my impression relatively stable.     1. Is she still on Prolia?     2. is that Citracal maximum or citracal petite?                    Contacted patient to review. Patient notes that her tablet form of calcium is Citracal 400 mg + D, taking two tabs daily, then on alternating with her Calcium gummies the next day which patient now reports that she takes two daily (1 serving) and that is a total of 500 mg calcium and 500 international units vitamin d.     Patient also confirms that she is still doing her every 6 month Prolia injection. Per Epic review, last injection was on 12/24/19.      Will update Dr. Lundberg.        .[facility]

## 2020-04-08 NOTE — TELEPHONE ENCOUNTER
Per Dr. Lundberg:    I see, stay the same dose, try to take dietary calium source.     Deanna           Patient advised. Patient verbalizes understanding and agrees to plan.      Yoselin Meza RN  Endocrine Care Coordinator  [facility]

## 2020-08-19 DIAGNOSIS — I10 HYPERTENSION WITH GOAL BLOOD PRESSURE LESS THAN 140/90: ICD-10-CM

## 2020-08-19 DIAGNOSIS — E78.5 HYPERLIPIDEMIA WITH TARGET LDL LESS THAN 130: ICD-10-CM

## 2020-08-24 RX ORDER — LOSARTAN POTASSIUM 50 MG/1
50 TABLET ORAL DAILY
Qty: 90 TABLET | Refills: 0 | Status: SHIPPED | OUTPATIENT
Start: 2020-08-24 | End: 2020-09-10

## 2020-08-24 RX ORDER — LOVASTATIN 20 MG
20 TABLET ORAL AT BEDTIME
Qty: 90 TABLET | Refills: 0 | Status: SHIPPED | OUTPATIENT
Start: 2020-08-24 | End: 2020-09-10

## 2020-09-08 ENCOUNTER — DOCUMENTATION ONLY (OUTPATIENT)
Dept: LAB | Facility: CLINIC | Age: 70
End: 2020-09-08

## 2020-09-08 DIAGNOSIS — Z00.00 ROUTINE HEALTH MAINTENANCE: Primary | ICD-10-CM

## 2020-09-10 ENCOUNTER — OFFICE VISIT (OUTPATIENT)
Dept: PEDIATRICS | Facility: CLINIC | Age: 70
End: 2020-09-10
Payer: COMMERCIAL

## 2020-09-10 VITALS
TEMPERATURE: 97.3 F | HEIGHT: 56 IN | OXYGEN SATURATION: 99 % | DIASTOLIC BLOOD PRESSURE: 60 MMHG | SYSTOLIC BLOOD PRESSURE: 125 MMHG | HEART RATE: 61 BPM | BODY MASS INDEX: 24.47 KG/M2 | WEIGHT: 108.8 LBS

## 2020-09-10 DIAGNOSIS — Z13.1 SCREENING FOR DIABETES MELLITUS (DM): ICD-10-CM

## 2020-09-10 DIAGNOSIS — Z13.29 SCREENING FOR THYROID DISORDER: ICD-10-CM

## 2020-09-10 DIAGNOSIS — Z00.00 ROUTINE GENERAL MEDICAL EXAMINATION AT A HEALTH CARE FACILITY: ICD-10-CM

## 2020-09-10 DIAGNOSIS — I10 HYPERTENSION WITH GOAL BLOOD PRESSURE LESS THAN 140/90: ICD-10-CM

## 2020-09-10 DIAGNOSIS — E78.5 HYPERLIPIDEMIA WITH TARGET LDL LESS THAN 130: ICD-10-CM

## 2020-09-10 DIAGNOSIS — Z00.00 ENCOUNTER FOR MEDICARE ANNUAL WELLNESS EXAM: Primary | ICD-10-CM

## 2020-09-10 DIAGNOSIS — R94.6 ABNORMAL FINDING ON THYROID FUNCTION TEST: ICD-10-CM

## 2020-09-10 DIAGNOSIS — R79.89 ELEVATED TSH: ICD-10-CM

## 2020-09-10 LAB
ALBUMIN SERPL-MCNC: 3.6 G/DL (ref 3.4–5)
ALP SERPL-CCNC: 96 U/L (ref 40–150)
ALT SERPL W P-5'-P-CCNC: 28 U/L (ref 0–50)
ANION GAP SERPL CALCULATED.3IONS-SCNC: 4 MMOL/L (ref 3–14)
AST SERPL W P-5'-P-CCNC: 20 U/L (ref 0–45)
BILIRUB SERPL-MCNC: 0.4 MG/DL (ref 0.2–1.3)
BUN SERPL-MCNC: 19 MG/DL (ref 7–30)
CALCIUM SERPL-MCNC: 9.3 MG/DL (ref 8.5–10.1)
CHLORIDE SERPL-SCNC: 107 MMOL/L (ref 94–109)
CHOLEST SERPL-MCNC: 196 MG/DL
CO2 SERPL-SCNC: 28 MMOL/L (ref 20–32)
CREAT SERPL-MCNC: 0.74 MG/DL (ref 0.52–1.04)
CREAT UR-MCNC: 139 MG/DL
GFR SERPL CREATININE-BSD FRML MDRD: 81 ML/MIN/{1.73_M2}
GLUCOSE SERPL-MCNC: 96 MG/DL (ref 70–99)
HDLC SERPL-MCNC: 73 MG/DL
LDLC SERPL CALC-MCNC: 103 MG/DL
MICROALBUMIN UR-MCNC: 23 MG/L
MICROALBUMIN/CREAT UR: 16.76 MG/G CR (ref 0–25)
NONHDLC SERPL-MCNC: 123 MG/DL
POTASSIUM SERPL-SCNC: 4 MMOL/L (ref 3.4–5.3)
PROT SERPL-MCNC: 7.8 G/DL (ref 6.8–8.8)
SODIUM SERPL-SCNC: 139 MMOL/L (ref 133–144)
T4 FREE SERPL-MCNC: 1.05 NG/DL (ref 0.76–1.46)
TRIGL SERPL-MCNC: 102 MG/DL
TSH SERPL DL<=0.005 MIU/L-ACNC: 4.17 MU/L (ref 0.4–4)

## 2020-09-10 PROCEDURE — 84443 ASSAY THYROID STIM HORMONE: CPT | Performed by: NURSE PRACTITIONER

## 2020-09-10 PROCEDURE — 36415 COLL VENOUS BLD VENIPUNCTURE: CPT | Performed by: NURSE PRACTITIONER

## 2020-09-10 PROCEDURE — 84439 ASSAY OF FREE THYROXINE: CPT | Performed by: NURSE PRACTITIONER

## 2020-09-10 PROCEDURE — 80053 COMPREHEN METABOLIC PANEL: CPT | Performed by: NURSE PRACTITIONER

## 2020-09-10 PROCEDURE — 82043 UR ALBUMIN QUANTITATIVE: CPT | Performed by: NURSE PRACTITIONER

## 2020-09-10 PROCEDURE — 80061 LIPID PANEL: CPT | Performed by: NURSE PRACTITIONER

## 2020-09-10 PROCEDURE — 99397 PER PM REEVAL EST PAT 65+ YR: CPT | Performed by: NURSE PRACTITIONER

## 2020-09-10 RX ORDER — LOSARTAN POTASSIUM 50 MG/1
50 TABLET ORAL DAILY
Qty: 90 TABLET | Refills: 3 | Status: SHIPPED | OUTPATIENT
Start: 2020-09-10 | End: 2021-05-26

## 2020-09-10 RX ORDER — LOVASTATIN 20 MG
20 TABLET ORAL AT BEDTIME
Qty: 90 TABLET | Refills: 3 | Status: SHIPPED | OUTPATIENT
Start: 2020-09-10 | End: 2021-05-26

## 2020-09-10 ASSESSMENT — MIFFLIN-ST. JEOR: SCORE: 858.57

## 2020-09-10 NOTE — PATIENT INSTRUCTIONS
PLAN:   1.   Symptomatic therapy suggested: Continue current medication regimen unchanged.  2.  Orders Placed This Encounter   Medications     losartan (COZAAR) 50 MG tablet     Sig: Take 1 tablet (50 mg) by mouth daily     Dispense:  90 tablet     Refill:  3     lovastatin (MEVACOR) 20 MG tablet     Sig: Take 1 tablet (20 mg) by mouth At Bedtime     Dispense:  90 tablet     Refill:  3     Orders Placed This Encounter   Procedures     JUST IN CASE     Lipid panel reflex to direct LDL Fasting     Albumin Random Urine Quantitative with Creat Ratio     Comprehensive metabolic panel     TSH with free T4 reflex     TSH with free T4 reflex     Thyroid peroxidase antibody       3. Patient needs to follow up in if no improvement,or sooner if worsening of symptoms or other symptoms develop.  FUTURE LABS:       - Schedule non-fasting labs in 1 to 2  Months  Will follow up and/or notify patient of  results via My Chart to determine further need for followup  Follow up office visit in one year for annual health maintenance exam, sooner PRN.    Patient Education   Personalized Prevention Plan  You are due for the preventive services outlined below.  Your care team is available to assist you in scheduling these services.  If you have already completed any of these items, please share that information with your care team to update in your medical record.  Health Maintenance Due   Topic Date Due     Discuss Advance Care Planning  1950     PHQ-2  01/01/2020     Annual Wellness Visit  07/25/2020     Flu Vaccine (1) 09/01/2020

## 2020-09-10 NOTE — NURSING NOTE
"Chief Complaint   Patient presents with     Physical     Wellness Visit       Initial /60 (BP Location: Right arm, Patient Position: Sitting, Cuff Size: Adult Regular)   Pulse 61   Temp 97.3  F (36.3  C) (Temporal)   Ht 1.41 m (4' 7.5\")   Wt 49.4 kg (108 lb 12.8 oz)   SpO2 99%   Breastfeeding No   BMI 24.83 kg/m   Estimated body mass index is 24.83 kg/m  as calculated from the following:    Height as of this encounter: 1.41 m (4' 7.5\").    Weight as of this encounter: 49.4 kg (108 lb 12.8 oz).  Medication Reconciliation: complete      TG Kang      "

## 2020-09-10 NOTE — PROGRESS NOTES
"  SUBJECTIVE:   Bree Carrillo is a 70 year old female who presents for Preventive Visit.    Are you in the first 12 months of your Medicare Part B coverage?  No    Physical Health:    In general, how would you rate your overall physical health? good    Outside of work, how many days during the week do you exercise? 4-5 days/week    Outside of work, approximately how many minutes a day do you exercise?45-60 minutes    If you drink alcohol do you typically have >3 drinks per day or >7 drinks per week? No    Do you usually eat at least 4 servings of fruit and vegetables a day, include whole grains & fiber and avoid regularly eating high fat or \"junk\" foods? Yes    Do you have any problems taking medications regularly?  No    Do you have any side effects from medications? none    Needs assistance for the following daily activities: no assistance needed    Which of the following safety concerns are present in your home?  none identified     Hearing impairment: No    In the past 6 months, have you been bothered by leaking of urine? no    Mental Health:    In general, how would you rate your overall mental or emotional health? good  PHQ-2 Score:      Do you feel safe in your environment? Yes    Have you ever done Advance Care Planning? (For example, a Health Directive, POLST, or a discussion with a medical provider or your loved ones about your wishes): No, advance care planning information given to patient to review.  Patient declined advance care planning discussion at this time.    Additional concerns to address?  No    Fall risk:  Fallen 2 or more times in the past year?: No  Any fall with injury in the past year?: No    Cognitive Screenin) Repeat 3 items (Leader, Season, Table)    2) Clock draw: NORMAL  3) 3 item recall: Recalls 2 objects   Results: NORMAL clock, 1-2 items recalled: COGNITIVE IMPAIRMENT LESS LIKELY    Mini-CogTM Copyright ROMINA Mckeon. Licensed by the author for use in [facility]; " reprinted with permission (jamir@.[facility]). All rights reserved.      Do you have sleep apnea, excessive snoring or daytime drowsiness?: no      Hyperlipidemia Follow-Up      Are you regularly taking any medication or supplement to lower your cholesterol?   Yes- lovastatin    Are you having muscle aches or other side effects that you think could be caused by your cholesterol lowering medication?  No    Hypertension Follow-up      Do you check your blood pressure regularly outside of the clinic? No     Are you following a low salt diet? Yes    Are your blood pressures ever more than 140 on the top number (systolic) OR more   than 90 on the bottom number (diastolic), for example 140/90? No      Reviewed and updated as needed this visit by clinical staff  Tobacco  Meds  Med Hx  Surg Hx  Fam Hx  Soc Hx        Reviewed and updated as needed this visit by Provider        Social History     Tobacco Use     Smoking status: Never Smoker     Smokeless tobacco: Never Used   Substance Use Topics     Alcohol use: No                           Current providers sharing in care for this patient include:   Patient Care Team:  Reina Corley APRN CNP as PCP - General (Family Practice)  Reina Corley APRN CNP as Assigned PCP    The following health maintenance items are reviewed in Epic and correct as of today:  Health Maintenance   Topic Date Due     ADVANCE CARE PLANNING  1950     PHQ-2  01/01/2020     MEDICARE ANNUAL WELLNESS VISIT  07/25/2020     INFLUENZA VACCINE (1) 09/01/2020     EYE EXAM  10/21/2020     FALL RISK ASSESSMENT  10/21/2020     MAMMO SCREENING  07/01/2021     DTAP/TDAP/TD IMMUNIZATION (2 - Td) 08/29/2022     COLORECTAL CANCER SCREENING  09/19/2024     LIPID  02/21/2025     DEXA  Completed     HEPATITIS C SCREENING  Completed     PNEUMOCOCCAL IMMUNIZATION 65+ LOW/MEDIUM RISK  Completed     ZOSTER IMMUNIZATION  Completed     IPV IMMUNIZATION  Aged Out     MENINGITIS IMMUNIZATION  Aged Out      HEPATITIS B IMMUNIZATION  Aged Out     Lab work is in process  Labs reviewed in EPIC  BP Readings from Last 3 Encounters:   09/10/20 125/60   12/24/19 (!) 158/72   07/25/19 111/70    Wt Readings from Last 3 Encounters:   09/10/20 49.4 kg (108 lb 12.8 oz)   12/24/19 53.4 kg (117 lb 11.2 oz)   07/25/19 51 kg (112 lb 6.4 oz)                  Patient Active Problem List   Diagnosis     Anemia     Closed right cuboid fracture     Falls     GERD (gastroesophageal reflux disease)     GI bleed     Hyperlipidemia with target LDL less than 130     Hypertension with goal blood pressure less than 140/90     Iron deficiency anemia     Osteoporosis     Right foot pain     Vitamin D deficiency     Senile osteoporosis     Adverse effect of other drugs, medicaments and biological substances, sequela     Past Surgical History:   Procedure Laterality Date     LASIK BILATERAL Bilateral 2012    Dr Chavez       Social History     Tobacco Use     Smoking status: Never Smoker     Smokeless tobacco: Never Used   Substance Use Topics     Alcohol use: No     Family History   Problem Relation Age of Onset     No Known Problems Mother      No Known Problems Father      No Known Problems Maternal Grandmother      No Known Problems Maternal Grandfather      No Known Problems Paternal Grandmother      No Known Problems Paternal Grandfather      Coronary Artery Disease Brother      No Known Problems Sister      No Known Problems Son      No Known Problems Daughter      No Known Problems Maternal Half-Brother      No Known Problems Maternal Half-Sister      No Known Problems Paternal Half-Brother      No Known Problems Paternal Half-Sister      No Known Problems Niece      No Known Problems Nephew      No Known Problems Cousin      No Known Problems Other      Diabetes No family hx of      Hypertension No family hx of      Hyperlipidemia No family hx of      Cerebrovascular Disease No family hx of      Breast Cancer No family hx of      Colon Cancer  No family hx of      Prostate Cancer No family hx of      Other Cancer No family hx of      Depression No family hx of      Anxiety Disorder No family hx of      Mental Illness No family hx of      Substance Abuse No family hx of      Anesthesia Reaction No family hx of      Asthma No family hx of      Osteoporosis No family hx of      Genetic Disorder No family hx of      Thyroid Disease No family hx of      Obesity No family hx of      Unknown/Adopted No family hx of          Current Outpatient Medications   Medication Sig Dispense Refill     Calcium Citrate-Vitamin D (CITRACAL + D) 250-200 MG-UNIT TABS Citracal Calcium 400mg 2 tabs(800mg) BID       losartan (COZAAR) 50 MG tablet Take 1 tablet (50 mg) by mouth daily 90 tablet 0     lovastatin (MEVACOR) 20 MG tablet Take 1 tablet (20 mg) by mouth At Bedtime 90 tablet 0     Multiple Vitamins-Minerals (ALIVE WOMENS GUMMY PO)        denosumab (PROLIA) 60 MG/ML SOLN injection Inject 60 mg Subcutaneous       mometasone (ELOCON) 0.1 % solution (lotion)        Allergies   Allergen Reactions     Iohexol Hives     Pt experienced sneeze, itching followed by hives post 100cc Omni 350 for CT Scan.   Observed with IV in place x 45 minutes.   Given 25mg PO Benadryl with OK for 50mg to be taken every 8 hours for the next 24 hours per Dr Rausch.     ABDOULAYE     Pneumonia Vaccine:Up to date    Mammogram Screening: Mammogram Screening: Patient over age 50, mutual decision to screen reflected in health maintenance.    ROS:  CONSTITUTIONAL:NEGATIVE for fever, chills, change in weight  INTEGUMENTARY/SKIN: NEGATIVE for worrisome rashes, moles or lesions  EYES: NEGATIVE for vision changes or irritation  ENT: NEGATIVE for ear, mouth and throat problems  RESP:NEGATIVE for significant cough or SOB  BREAST: NEGATIVE for masses, tenderness or discharge  CV: NEGATIVE for chest pain, palpitations or peripheral edema  GI: NEGATIVE for nausea, abdominal pain, heartburn, or change in bowel habits    "menopausal female: amenorrhea, no unusual urinary symptoms and no unusual vaginal symptoms  MUSCULOSKELETAL:NEGATIVE for significant arthralgias or myalgia  NEURO: NEGATIVE for weakness, dizziness or paresthesias  ENDOCRINE: NEGATIVE for temperature intolerance, skin/hair changes  HEME/ALLERGY/IMMUNE: NEGATIVE for bleeding problems  PSYCHIATRIC: NEGATIVE for changes in mood or affect     OBJECTIVE:   /60 (BP Location: Right arm, Patient Position: Sitting, Cuff Size: Adult Regular)   Pulse 61   Temp 97.3  F (36.3  C) (Temporal)   Ht 1.41 m (4' 7.5\")   Wt 49.4 kg (108 lb 12.8 oz)   SpO2 99%   Breastfeeding No   BMI 24.83 kg/m   Estimated body mass index is 26.63 kg/m  as calculated from the following:    Height as of 12/24/19: 1.416 m (4' 7.75\").    Weight as of 12/24/19: 53.4 kg (117 lb 11.2 oz).  EXAM:   GENERAL APPEARANCE: healthy, alert and no distress  EYES: Eyes grossly normal to inspection and conjunctivae and sclerae normal  HENT: ear canals and TM's normal, nose and mouth without ulcers or lesions, oropharynx clear and oral mucous membranes moist  NECK: no adenopathy, no asymmetry, masses, or scars and thyroid normal to palpation  RESP: lungs clear to auscultation - no rales, rhonchi or wheezes  BREAST: normal without masses, tenderness or nipple discharge and no palpable axillary masses or adenopathy  CV: regular rates and rhythm, no murmur, click or rub, no peripheral edema and peripheral pulses strong  ABDOMEN: soft, nontender, no hepatosplenomegaly, no masses and bowel sounds normal   (female): deferred  MS: no musculoskeletal defects are noted and gait is age appropriate without ataxia  SKIN: no suspicious lesions or rashes  NEURO: Normal strength and tone, sensory exam grossly normal, mentation intact and speech normal  PSYCH: mentation appears normal and affect normal/bright    Diagnostic Test Results:  Labs reviewed in Epic  Results for orders placed or performed in visit on 09/10/20 "   TSH with free T4 reflex     Status: Abnormal   Result Value Ref Range    TSH 4.17 (H) 0.40 - 4.00 mU/L   Comprehensive metabolic panel     Status: None   Result Value Ref Range    Sodium 139 133 - 144 mmol/L    Potassium 4.0 3.4 - 5.3 mmol/L    Chloride 107 94 - 109 mmol/L    Carbon Dioxide 28 20 - 32 mmol/L    Anion Gap 4 3 - 14 mmol/L    Glucose 96 70 - 99 mg/dL    Urea Nitrogen 19 7 - 30 mg/dL    Creatinine 0.74 0.52 - 1.04 mg/dL    GFR Estimate 81 >60 mL/min/[1.73_m2]    GFR Estimate If Black >90 >60 mL/min/[1.73_m2]    Calcium 9.3 8.5 - 10.1 mg/dL    Bilirubin Total 0.4 0.2 - 1.3 mg/dL    Albumin 3.6 3.4 - 5.0 g/dL    Protein Total 7.8 6.8 - 8.8 g/dL    Alkaline Phosphatase 96 40 - 150 U/L    ALT 28 0 - 50 U/L    AST 20 0 - 45 U/L   Lipid panel reflex to direct LDL Fasting     Status: Abnormal   Result Value Ref Range    Cholesterol 196 <200 mg/dL    Triglycerides 102 <150 mg/dL    HDL Cholesterol 73 >49 mg/dL    LDL Cholesterol Calculated 103 (H) <100 mg/dL    Non HDL Cholesterol 123 <130 mg/dL   T4 free     Status: None   Result Value Ref Range    T4 Free 1.05 0.76 - 1.46 ng/dL       ASSESSMENT / PLAN:   Bree was seen today for physical and wellness visit.    Diagnoses and all orders for this visit:    Encounter for Medicare annual wellness exam    Routine general medical examination at a health care facility  -     JUST IN CASE; Future  -     Lipid panel reflex to direct LDL Fasting; Future  -     Albumin Random Urine Quantitative with Creat Ratio; Future  -     Comprehensive metabolic panel; Future  -     TSH with free T4 reflex; Future    Hypertension with goal blood pressure less than 140/90  -     Albumin Random Urine Quantitative with Creat Ratio; Future  -     Comprehensive metabolic panel; Future  -     losartan (COZAAR) 50 MG tablet; Take 1 tablet (50 mg) by mouth daily  Continue current medication regimen unchanged.    Hyperlipidemia with target LDL less than 130  -     Lipid panel reflex to  "direct LDL Fasting; Future  -     lovastatin (MEVACOR) 20 MG tablet; Take 1 tablet (20 mg) by mouth At Bedtime  Continue current medication regimen unchanged.    Screening for thyroid disorder  -     TSH with free T4 reflex; Future  -     TSH with free T4 reflex; Future  -     Thyroid peroxidase antibody; Future    Screening for diabetes mellitus (DM)  -     JUST IN CASE; Future  -     Comprehensive metabolic panel; Future    Elevated TSH  -     TSH with free T4 reflex; Future  -     Thyroid peroxidase antibody; Future    Abnormal finding on thyroid function test  -     TSH with free T4 reflex; Future  -     Thyroid peroxidase antibody; Future    PLAN:    Patient needs to follow up in if no improvement,or sooner if worsening of symptoms or other symptoms develop.  FUTURE LABS:       - Schedule non-fasting labs in 1 to 2  Months  Will follow up and/or notify patient of  results via My Chart to determine further need for followup  Follow up office visit in one year for annual health maintenance exam, sooner PRN.      COUNSELING:  Reviewed preventive health counseling, as reflected in patient instructions  Special attention given to:       Regular exercise       Healthy diet/nutrition       Vision screening       Osteoporosis Prevention/Bone Health       Colon cancer screening       Hepatitis C screening       The 10-year ASCVD risk score (Pingree ANA M Jr., et al., 2013) is: 12.8%    Values used to calculate the score:      Age: 70 years      Sex: Female      Is Non- : No      Diabetic: No      Tobacco smoker: No      Systolic Blood Pressure: 125 mmHg      Is BP treated: Yes      HDL Cholesterol: 73 mg/dL      Total Cholesterol: 196 mg/dL       Advanced Planning     Estimated body mass index is 24.83 kg/m  as calculated from the following:    Height as of this encounter: 1.41 m (4' 7.5\").    Weight as of this encounter: 49.4 kg (108 lb 12.8 oz).        She reports that she has never smoked. She has " never used smokeless tobacco.    Appropriate preventive services were discussed with this patient, including applicable screening as appropriate for cardiovascular disease, diabetes, osteopenia/osteoporosis, and glaucoma.  As appropriate for age/gender, discussed screening for colorectal cancer, prostate cancer, breast cancer, and cervical cancer. Checklist reviewing preventive services available has been given to the patient.    Reviewed patients plan of care and provided an AVS. The Intermediate Care Plan ( asthma action plan, low back pain action plan, and migraine action plan) for Bree meets the Care Plan requirement. This Care Plan has been established and reviewed with the Patient.    Counseling Resources:  ATP IV Guidelines  Pooled Cohorts Equation Calculator  Breast Cancer Risk Calculator  BRCA-Related Cancer Risk Assessment: FHS-7 Tool  FRAX Risk Assessment  ICSI Preventive Guidelines  Dietary Guidelines for Americans, 2010  USDA's MyPlate  ASA Prophylaxis  Lung CA Screening    AYANNA Bowser [facility]

## 2020-09-10 NOTE — RESULT ENCOUNTER NOTE
Lalita Carrillo,    Attached are your test results.  -Cholesterol levels are at your goal levels.  ADVISE: continuing your medication, a regular exercise program with at least 150 minutes of aerobic exercise per week, and eating a low saturated fat/low carbohydrate diet.  Also, you should recheck this fasting cholesterol panel in 12 months.  -Liver and gallbladder tests are normal (ALT,AST, Alk phos, bilirubin), kidney function is normal (Cr, GFR), sodium is normal, potassium is normal, calcium is normal, glucose is normal.  -TSH (thyroid stimulating hormone) level can indicate hypothyroidism (an underactive thyroid).  This can cause a number of symptoms including weight gain, fatigue, high cholesterol, constipation, hair loss, cold intolerance).  ADVISE:rechecking your TSH in 1 month.   -Microalbumin (urine protein) test is normal.  ADVISE: rechecking this annually.   Please contact us if you have any questions.    Reina Corley, CNP

## 2020-10-14 DIAGNOSIS — Z13.29 SCREENING FOR THYROID DISORDER: ICD-10-CM

## 2020-10-14 DIAGNOSIS — R79.89 ELEVATED TSH: ICD-10-CM

## 2020-10-14 DIAGNOSIS — E78.5 HYPERLIPIDEMIA WITH TARGET LDL LESS THAN 130: ICD-10-CM

## 2020-10-14 DIAGNOSIS — R94.6 ABNORMAL FINDING ON THYROID FUNCTION TEST: ICD-10-CM

## 2020-10-14 LAB
T4 FREE SERPL-MCNC: 0.9 NG/DL (ref 0.76–1.46)
TSH SERPL DL<=0.005 MIU/L-ACNC: 5.22 MU/L (ref 0.4–4)

## 2020-10-14 PROCEDURE — 86376 MICROSOMAL ANTIBODY EACH: CPT | Performed by: NURSE PRACTITIONER

## 2020-10-14 PROCEDURE — 84443 ASSAY THYROID STIM HORMONE: CPT | Performed by: NURSE PRACTITIONER

## 2020-10-14 PROCEDURE — 36415 COLL VENOUS BLD VENIPUNCTURE: CPT | Performed by: NURSE PRACTITIONER

## 2020-10-14 PROCEDURE — 84439 ASSAY OF FREE THYROXINE: CPT | Performed by: NURSE PRACTITIONER

## 2020-10-14 NOTE — RESULT ENCOUNTER NOTE
Lalita Carrillo,    Attached are your test results.  -TSH (thyroid stimulating hormone) level still mildly elevated. Waiting on thyroid antibody   Reina Corley, CNP

## 2020-10-15 LAB — THYROPEROXIDASE AB SERPL-ACNC: 47 IU/ML

## 2020-10-16 DIAGNOSIS — R79.89 ELEVATED TSH: Primary | ICD-10-CM

## 2020-10-16 NOTE — RESULT ENCOUNTER NOTE
Lalita Carrillo,    Attached are your test results.  -TSH (thyroid stimulating hormone) level indicates hypothyroidism (an underactive thyroid).  Also you antibody is elevated   With it just being mildly elevated likely would be considered subclinical hypothyroidism   However we could consider refer you to endocrinology and see what they recommend    Please call [phone] to make appointment  if you do not hear from referrals in the next few days.      Please contact us if you have any questions.    Reina Corley, CNP

## 2020-10-26 ENCOUNTER — TELEPHONE (OUTPATIENT)
Dept: PEDIATRICS | Facility: CLINIC | Age: 70
End: 2020-10-26

## 2020-10-26 ENCOUNTER — TELEPHONE (OUTPATIENT)
Dept: ENDOCRINOLOGY | Facility: CLINIC | Age: 70
End: 2020-10-26

## 2020-10-26 NOTE — TELEPHONE ENCOUNTER
[facility] Call Center    Phone Message    May a detailed message be left on voicemail: yes     Reason for Call: Requesting Results   Name/type of test: TSH  Date of test: 10/16/2020  Was test done at a location other than [facility] (Please fill in the location if not [facility])?: No      Action Taken: Message routed to:  Primary Care p [id number]    Travel Screening: Not Applicable

## 2020-10-26 NOTE — TELEPHONE ENCOUNTER
[facility] Call Center    Phone Message    May a detailed message be left on voicemail: yes     Reason for Call:  Patient called to schedule new patient appointment from a referral. Writer offered first available virtual appointment per protocols. Patient declined virtual visit. Please call back to discuss.    Action Taken: Message routed to:  Adult Clinics: Endocrinology p [id number]    Travel Screening: Not Applicable

## 2020-10-26 NOTE — TELEPHONE ENCOUNTER
Reina Corley APRN CNP   10/16/2020 11:16 AM      Lalita Carrillo,     Attached are your test results.  -TSH (thyroid stimulating hormone) level indicates hypothyroidism (an underactive thyroid).  Also you antibody is elevated   With it just being mildly elevated likely would be considered subclinical hypothyroidism   However we could consider refer you to endocrinology and see what they recommend     Please call [phone] to make appointment  if you do not hear from referrals in the next few days.       Please contact us if you have any questions.     KYRIE Méndez Loretta Louisa, APRN CNP   10/14/2020  9:11 AM      Lalita Carrillo,     Attached are your test results.  -TSH (thyroid stimulating hormone) level still mildly elevated. Waiting on thyroid antibody   Reina Corley CNP        Results were released to My Chart message above.   >called and advised of note above. She will call and schedule Endo appt. Razia MAS

## 2020-10-27 ENCOUNTER — TELEPHONE (OUTPATIENT)
Dept: PEDIATRICS | Facility: CLINIC | Age: 70
End: 2020-10-27

## 2020-10-27 DIAGNOSIS — R79.89 ELEVATED TSH: Primary | ICD-10-CM

## 2020-10-27 DIAGNOSIS — R94.6 ABNORMAL RESULTS OF THYROID FUNCTION STUDIES: ICD-10-CM

## 2020-10-27 NOTE — TELEPHONE ENCOUNTER
[facility] Call Center    Phone Message    May a detailed message be left on voicemail: yes     Reason for Call: Patient called inquiring how often she should be having lab work done? She also wanted Reina Corley to know she has appointment with Endocrinology on 1/4/20. Please advise. Thank you.    Action Taken: Message routed to:  Primary Care p [id number]    Travel Screening: Not Applicable

## 2020-10-27 NOTE — TELEPHONE ENCOUNTER
Patient is scheduled for 1st available in person visit, she again refused virtual. Appointment is 1/4/2021 at 9am with Dr. Mcnally.    Joy Dee, [facility]  Adult Endocrinology  [facility]

## 2020-10-28 ENCOUNTER — TELEPHONE (OUTPATIENT)
Dept: PEDIATRICS | Facility: CLINIC | Age: 70
End: 2020-10-28

## 2020-10-28 NOTE — TELEPHONE ENCOUNTER
Patient contacted, informed that there should be no need to follow up with AYANNA Real CNP following a dental implant.  Advised if there were any issues or concerns regarding the implant it would be advised to follow up with the dentist or oral surgeon.      Patient verbalizes understanding.      Bee Shea RN

## 2020-10-28 NOTE — TELEPHONE ENCOUNTER
[facility] Call Center    Phone Message    May a detailed message be left on voicemail: yes     Reason for Call: Patient called stating that she is going to the dentist this week to have an implant put in. Patient wants to know how long she needs to wait to make an appointment with Senait Corley for a follow up after the implant. Please advise. Thank you.    Action Taken: Message routed to:  Primary Care p [phone]    Travel Screening: Not Applicable

## 2020-10-28 NOTE — TELEPHONE ENCOUNTER
Could recheck in a month  Her level is just mildly elevated so no urgency to see endocrinology  as often at this level may just be monitored

## 2020-10-28 NOTE — TELEPHONE ENCOUNTER
Patient informed of provider advice.  She agrees to schedule a lab appointment in one month to recheck thyroid labs.  States she will keep the appointment scheduled with endocrinology.    Bee Shea RN

## 2020-11-10 ENCOUNTER — ANCILLARY PROCEDURE (OUTPATIENT)
Dept: MAMMOGRAPHY | Facility: CLINIC | Age: 70
End: 2020-11-10
Attending: NURSE PRACTITIONER
Payer: COMMERCIAL

## 2020-11-10 DIAGNOSIS — Z12.31 ENCOUNTER FOR SCREENING MAMMOGRAM FOR BREAST CANCER: ICD-10-CM

## 2020-11-10 PROCEDURE — 77067 SCR MAMMO BI INCL CAD: CPT | Performed by: STUDENT IN AN ORGANIZED HEALTH CARE EDUCATION/TRAINING PROGRAM

## 2020-11-10 PROCEDURE — 77063 BREAST TOMOSYNTHESIS BI: CPT | Performed by: STUDENT IN AN ORGANIZED HEALTH CARE EDUCATION/TRAINING PROGRAM

## 2020-11-13 DIAGNOSIS — R79.89 ELEVATED TSH: ICD-10-CM

## 2020-11-13 DIAGNOSIS — R94.6 ABNORMAL RESULTS OF THYROID FUNCTION STUDIES: ICD-10-CM

## 2020-11-13 LAB
T4 FREE SERPL-MCNC: 1 NG/DL (ref 0.76–1.46)
TSH SERPL DL<=0.005 MIU/L-ACNC: 2.65 MU/L (ref 0.4–4)

## 2020-11-13 PROCEDURE — 36415 COLL VENOUS BLD VENIPUNCTURE: CPT | Performed by: NURSE PRACTITIONER

## 2020-11-13 PROCEDURE — 84439 ASSAY OF FREE THYROXINE: CPT | Performed by: NURSE PRACTITIONER

## 2020-11-13 PROCEDURE — 84443 ASSAY THYROID STIM HORMONE: CPT | Performed by: NURSE PRACTITIONER

## 2020-11-16 NOTE — RESULT ENCOUNTER NOTE
Lalita Carrillo,    Attached are your test results.  -TSH (thyroid stimulating hormone) level is normal which indicates normal thyroid function.   Please contact us if you have any questions.    Reina Corley, CNP

## 2020-11-18 ENCOUNTER — TELEPHONE (OUTPATIENT)
Dept: PEDIATRICS | Facility: CLINIC | Age: 70
End: 2020-11-18

## 2020-11-18 NOTE — TELEPHONE ENCOUNTER
Patient contacted, informed of normal thyroid labs and normal mammogram.  Patient wonders if she needs to keep the lab appointment in December, and would like AYANNA Real CNP to advise if she should see endocrinology.      Please advise.    Bee Shea RN

## 2020-11-18 NOTE — TELEPHONE ENCOUNTER
[facility] Call Center    Phone Message    May a detailed message be left on voicemail: yes     Reason for Call: Patient requesting a call back to discuss results from mammogram (11/10) and labs (11/13).  Please call patient back to discuss.    Action Taken: Message routed to:  Primary Care p [id number]    Travel Screening: Not Applicable

## 2020-11-27 ENCOUNTER — TELEPHONE (OUTPATIENT)
Dept: PEDIATRICS | Facility: CLINIC | Age: 70
End: 2020-11-27

## 2020-11-27 NOTE — TELEPHONE ENCOUNTER
Routing to provider to please advise on mammogram questions.     Upon chart review, patient is scheduled for a lab only apt on 12/14/2020 but not seeing future lab orders placed.     Please advise.    Eulalia Serra RN  [facility]

## 2020-11-27 NOTE — TELEPHONE ENCOUNTER
[facility] Call Center    Phone Message    May a detailed message be left on voicemail: yes     Reason for Call: The patient called asking about the mammogram report and would like to speak with care team regarding results. She says the report she received states thickened tissue but does not say which side, left or right? Also, she is wondering if she needs to keep her lab appointment on 12/14/20? Please advise. Thank you.     Action Taken: Message routed to:  Primary Care p [id number]    Travel Screening: Not Applicable

## 2020-11-27 NOTE — TELEPHONE ENCOUNTER
Called patient and gave message per Senait Corley NP.  Patient verbalized understanding and agreement to plan.     Canceled the lab appt.      Fatou Haji RN, [facility]

## 2020-11-27 NOTE — TELEPHONE ENCOUNTER
Please let her know she has dense tissue but normal appearing.   No lab appointment needed in December   Thanks     EXAM: MA SCREENING BILATERAL W/ OZIEL and CAD, 11/10/2020 7:55 AM     COMPARISONS: 7/1/2019, 6/28/2018, 6/22/2017, 6/22/2016, 3/6/2015     HISTORY/family history: No current breast symptom.     Technique: Tomosynthesis     FINDINGS: No significant change.     BREAST DENSITY: Heterogeneously dense.                                                                      IMPRESSION: BI-RADS CATEGORY: 1 -  Negative.     RECOMMENDED FOLLOW-UP: Annual Mammography.     Results to be sent to the patient.        LORIE LACEY MD

## 2021-02-12 ENCOUNTER — OFFICE VISIT (OUTPATIENT)
Dept: ENDOCRINOLOGY | Facility: CLINIC | Age: 71
End: 2021-02-12
Payer: COMMERCIAL

## 2021-02-12 VITALS
WEIGHT: 111.3 LBS | SYSTOLIC BLOOD PRESSURE: 166 MMHG | OXYGEN SATURATION: 99 % | BODY MASS INDEX: 25.4 KG/M2 | HEART RATE: 85 BPM | DIASTOLIC BLOOD PRESSURE: 80 MMHG

## 2021-02-12 DIAGNOSIS — E03.8 SUBCLINICAL HYPOTHYROIDISM: ICD-10-CM

## 2021-02-12 DIAGNOSIS — I10 ESSENTIAL HYPERTENSION: ICD-10-CM

## 2021-02-12 DIAGNOSIS — M81.0 OSTEOPOROSIS, UNSPECIFIED OSTEOPOROSIS TYPE, UNSPECIFIED PATHOLOGICAL FRACTURE PRESENCE: Primary | ICD-10-CM

## 2021-02-12 LAB
T4 FREE SERPL-MCNC: 0.97 NG/DL (ref 0.76–1.46)
TSH SERPL DL<=0.005 MIU/L-ACNC: 4.27 MU/L (ref 0.4–4)

## 2021-02-12 PROCEDURE — 84439 ASSAY OF FREE THYROXINE: CPT | Performed by: INTERNAL MEDICINE

## 2021-02-12 PROCEDURE — 84443 ASSAY THYROID STIM HORMONE: CPT | Performed by: INTERNAL MEDICINE

## 2021-02-12 PROCEDURE — 36415 COLL VENOUS BLD VENIPUNCTURE: CPT | Performed by: INTERNAL MEDICINE

## 2021-02-12 PROCEDURE — 99214 OFFICE O/P EST MOD 30 MIN: CPT | Performed by: INTERNAL MEDICINE

## 2021-02-12 RX ORDER — LEVOTHYROXINE SODIUM 50 UG/1
50 TABLET ORAL DAILY
Qty: 90 TABLET | Refills: 3 | Status: SHIPPED | OUTPATIENT
Start: 2021-02-12 | End: 2022-01-21

## 2021-02-12 NOTE — NURSING NOTE
Bree Carrillo's goals for this visit include:   Chief Complaint   Patient presents with     Osteoporosis     She requests these members of her care team be copied on today's visit information: Yes    PCP: Reina Corley    Referring Provider:  Reina Corley, AYANNA CNP  [address] [place] [address]    BP (!) 166/80 (BP Location: Left arm, Patient Position: Sitting, Cuff Size: Adult Small)   Pulse 85   Wt 50.5 kg (111 lb 4.8 oz)   SpO2 99%   BMI 25.40 kg/m      Do you need any medication refills at today's visit? No

## 2021-02-12 NOTE — PROGRESS NOTES
- Endocrinology Follow up -    Reason for visit/consult:  Osteoporosis    Primary care provider: Clinic, [facility]      Assessment and Plan  70 year old female with osteoporosis    # Osteoporosis  Compared to 2015, 2017 bone density showed lumbar slight improvement from a -2.7 up to -2.4.  However left hip decreased bone density, which she has had pain recently.  Prolia seems working, we will modify calcium supplement and continue Prolia for now.       - Continue current Prolia (#5) will be resumed this year   - Calcium citrate plus D petite 2 tab daily, patient mentioned bothering GI but recommended to try with meal twice a day    - Bone density prior to next visit      #Subclinical hypothyroidism   will check thyroid function today again    - TSH, free T4    # Mammogram  Patient requested to read the results. No abnormal finding except dense tissues.     # Essential HTN  160s SBP, will repeat.     RTC with me in 1 year.    Addendum  Start LT4 50 mcg    Ref. Range 2/12/2021 16:14   T4 Free Latest Ref Range: 0.76 - 1.46 ng/dL 0.97   TSH Latest Ref Range: 0.40 - 4.00 mU/L 4.27 (H)          Deanna Lundberg MD  Staff Physician  Endocrinology and Metabolism  License: [id number]    Interval History as of 2/12/2021 : Patient has been doing well. Last year skipped prolia since COVID. No fractures. Taking citracal petite 2 tab but started to have bothering GI and stopped.   HPI: A 68 female her for osteoporosis. Osteoporosis diagnosed in 2015 with T score -2.7 lumber, by bone density. Last few month left pain hip and hands. Last 2 years she has been on prolia, so far received 4 times. Last dose of Prolia was last winter. Calcium carbnate prescribed but could not swallow because of the size.   Currently switched to Calcium carbonate gummies OTC unknown dose (4 tab).  She has never tried Fosamax.     Prior fragility fracture: no  Parental  history of hip fracture: no  Rheumatoid arthritis: no  Secondary cause of osteoporosis: no  Body habitus (BMI less than or equal to 20): no  Family history of osteoporosis: no  Sedentary lifestyle: no  Current tabacco smoking: no  History of thyroid disorder: no  Calcuim and Vitmin D supplements: calicum carbonate gummies  Other supplement or bone treatment: Prolia   Medication use (PPI, epileptic medications etc): no  Early menopause (female): around 47.     Past Medical/Surgical History:  Past Medical History:   Diagnosis Date     Hypertension      Past Surgical History:   Procedure Laterality Date     LASIK BILATERAL Bilateral 2012    Dr Chavez       Allergies:  Allergies   Allergen Reactions     Iohexol Hives     Pt experienced sneeze, itching followed by hives post 100cc Omni 350 for CT Scan.   Observed with IV in place x 45 minutes.   Given 25mg PO Benadryl with OK for 50mg to be taken every 8 hours for the next 24 hours per Dr Rausch.     JRS       Current Medications   Current Outpatient Medications   Medication     denosumab (PROLIA) 60 MG/ML SOLN injection     losartan (COZAAR) 50 MG tablet     lovastatin (MEVACOR) 20 MG tablet     mometasone (ELOCON) 0.1 % solution (lotion)     Calcium Citrate-Vitamin D (CITRACAL + D) 250-200 MG-UNIT TABS     Multiple Vitamins-Minerals (ALIVE WOMENS GUMMY PO)     No current facility-administered medications for this visit.        Family History:  Family History   Problem Relation Age of Onset     No Known Problems Mother      No Known Problems Father      No Known Problems Maternal Grandmother      No Known Problems Maternal Grandfather      No Known Problems Paternal Grandmother      No Known Problems Paternal Grandfather      Coronary Artery Disease Brother      No Known Problems Sister      No Known Problems Son      No Known Problems Daughter      No Known Problems Maternal Half-Brother      No Known Problems Maternal Half-Sister      No Known Problems Paternal  Half-Brother      No Known Problems Paternal Half-Sister      No Known Problems Niece      No Known Problems Nephew      No Known Problems Cousin      No Known Problems Other      Diabetes No family hx of      Hypertension No family hx of      Hyperlipidemia No family hx of      Cerebrovascular Disease No family hx of      Breast Cancer No family hx of      Colon Cancer No family hx of      Prostate Cancer No family hx of      Other Cancer No family hx of      Depression No family hx of      Anxiety Disorder No family hx of      Mental Illness No family hx of      Substance Abuse No family hx of      Anesthesia Reaction No family hx of      Asthma No family hx of      Osteoporosis No family hx of      Genetic Disorder No family hx of      Thyroid Disease No family hx of      Obesity No family hx of      Unknown/Adopted No family hx of        Social History:  Social History     Tobacco Use     Smoking status: Never Smoker     Smokeless tobacco: Never Used   Substance Use Topics     Alcohol use: No   lives with , adult kids (37, 35). 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     ROS:  Full review of systems taken with the help of the intake sheet. Otherwise a complete 14 point review of systems was taken and is negative unless stated in the history above.    Physical Exam:   Blood pressure (!) 166/80, pulse 85, weight 50.5 kg (111 lb 4.8 oz), SpO2 99 %, not currently breastfeeding.    General: well appearing, no acute distress, pleasant and conversant,   Mental Status/neuro: alert and oriented  Face: symmetrical, normal facial color  Eyes: anicteric, PERRL, no proptosis or lid lag  Neck: suppler, no lymphadenopahty  Thyroid: normal size and texture, no nodule palpable, no bruits  Back: No scoliosis no kyphosis  Heart: regular rhythm, S1S2, no murmur appreciated  Lung: clear to auscultation bilaterally  Abdomen: soft, NT/ND, no hepatomegaly  Legs: no swelling or edema      Labs : I reviewed data from epic and  extract and summarize the pertinent data here.   Lab Results   Component Value Date     07/23/2018      Lab Results   Component Value Date    POTASSIUM 4.0 07/23/2018     Lab Results   Component Value Date    CHLORIDE 107 07/23/2018     Lab Results   Component Value Date    DEVAN 8.3 07/23/2018     Lab Results   Component Value Date    CO2 29 07/23/2018     Lab Results   Component Value Date    BUN 20 07/23/2018     Lab Results   Component Value Date    CR 0.70 07/23/2018     Lab Results   Component Value Date    GLC 90 07/23/2018     Lab Results   Component Value Date    TSH 2.35 07/23/2018     Lab Results   Component Value Date    T4 0.96 06/28/2018       Bone Denisty: 8/4/2017: I also personally reviewed the original images and explained to the patient .      1.  This patient's T-score meets the World Health Organization (WHO)   criteria for osteoporosis at one or more measured sites (T-score -2.5 or   below).  The risk of osteoporotic fracture increases approximately   two-fold for each 1.0 SD decrease in T-score.    2.  Moderate levoscoliosis of lumbar spine.       COMPARISON:  Comparison with previous study dated 06/17/2015 shows:    There was a statistically significant increase in the bone mineral density   in the spine.    There was no statistically significant change in the bone mineral density   in the right hip.    There was a statistically significant decrease in the bone mineral density   in the left hip.      Comparison with baseline study dated 09/05/2013 shows:    There was a statistically significant increase in the bone mineral density   in the spine.    There was no statistically significant change in the bone mineral density   in the right hip.    There was a statistically significant decrease in the bone mineral density   in the left hip.      Statistical significance is determined by the least significant change   (LSC) for the scanner and operators at this facility.    FRAX is not  reported because this patient's T-score meets the World Health   Organization (WHO) criteria for osteoporosis and this patient is currently   taking medication for abnormal bone density.    The scan details are [facility].    Radha Navarro M.D.    Breast/Diagnostic Radiologist  [facility]  [email or web address]  SJA:collin  R:8/4/2017 T:8/4/2017   Result Narrative   DIAGNOSTIC DXA BONE MINERAL DENSITY, 8/4/2017    CLINICAL HISTORY:  This is a 67-year-old female patient.      RISK FACTORS:  The patient has estrogen deficiency.  The patient has a   history of osteoporosis based on a prior BMD study.  The patient has a   body weight under 127 pounds.  The patient has the following medical   condition(s):  Vitamin D deficiency.  The patient has taken for at least   one month the following medications in the last year:  Medications for   osteoporosis.    TECHNIQUE:  Dual-energy x-ray absorptiometry system (axial skeleton).  The   patient was scanned on a GE Lunar Prodigy scanner, fast-array scan mode.    The study was technically adequate.      FINDINGS:       BMD T-Score Z-Score   Lumbar Spine (L1 to L4) 0.896 g/cm2 -2.4 -0.2   Right Hip Femoral Neck 0.758 g/cm2 -2.0 -0.1   Right Total Hip (BMD for comparison to prior) 0.773 g/cm2 -1.9 -0.1   Left Hip Femoral Neck 0.688 g/cm2 -2.5 -0.6   Left Total Hip (BMD for comparison to prior) 0.757 g/cm2 -2.0 -0.2     6/17/2015    DIAGNOSTIC DXA BONE MINERAL DENSITY, 6/17/2015    CLINICAL HISTORY:  This is a 64-year-old female patient.      RISK FACTORS:  The patient has estrogen deficiency.  The patient has a   history of low bone density based on a prior BMD study (osteopenia).  The   patient has a body weight under 127 pounds.      TECHNIQUE:  Dual-energy x-ray absorptiometry system (axial skeleton).  The   patient was scanned on a GE Lunar Prodigy scanner, fast-array scan mode.    The study was technically  adequate.      FINDINGS:       BMD T-Score Z-Score   Lumbar Spine (L1 to L4) 0.861 g/cm2 -2.7 -0.6   Right Hip Femoral Neck 0.748 g/cm2 -2.1 -0.3   Right Total Hip   0.739 g/cm2 -2.1 -0.6   Left Hip Femoral Neck 0.722 g/cm2 -2.3 -0.5   Left Total Hip   0.808 g/cm2 -1.6 0.0

## 2021-02-12 NOTE — LETTER
2/12/2021         RE: Bree Carrillo  [address]  [place] [address]        Dear Colleague,    Thank you for referring your patient, Bree Carrillo, to the [facility]. Please see a copy of my visit note below.                                                                               - Endocrinology Follow up -    Reason for visit/consult:  Osteoporosis    Primary care provider: [facility], [facility]      Assessment and Plan  70 year old female with osteoporosis    # Osteoporosis  Compared to 2015, 2017 bone density showed lumbar slight improvement from a -2.7 up to -2.4.  However left hip decreased bone density, which she has had pain recently.  Prolia seems working, we will modify calcium supplement and continue Prolia for now.       - Continue current Prolia (#5) will be resumed this year   - Calcium citrate plus D petite 2 tab daily, patient mentioned bothering GI but recommended to try with meal twice a day    - Bone density prior to next visit      #Subclinical hypothyroidism   will check thyroid function today again    - TSH, free T4    # Mammogram  Patient requested to read the results. No abnormal finding except dense tissues.     # Essential HTN  160s SBP, will repeat.     RTC with me in 1 year.    Addendum  Start LT4 50 mcg    Ref. Range 2/12/2021 16:14   T4 Free Latest Ref Range: 0.76 - 1.46 ng/dL 0.97   TSH Latest Ref Range: 0.40 - 4.00 mU/L 4.27 (H)          Deanna Lundberg MD  Staff Physician  Endocrinology and Metabolism  License: [id number]    Interval History as of 2/12/2021 : Patient has been doing well. Last year skipped prolia since COVID. No fractures. Taking citracal petite 2 tab but started to have bothering GI and stopped.   HPI: A 68 female her for osteoporosis. Osteoporosis diagnosed in 2015 with T score -2.7 lumber, by bone density. Last few month left pain hip and hands. Last 2 years she has been on prolia, so far received 4 times.  Last dose of Prolia was last winter. Calcium carbnate prescribed but could not swallow because of the size.   Currently switched to Calcium carbonate gummies OTC unknown dose (4 tab).  She has never tried Fosamax.     Prior fragility fracture: no  Parental history of hip fracture: no  Rheumatoid arthritis: no  Secondary cause of osteoporosis: no  Body habitus (BMI less than or equal to 20): no  Family history of osteoporosis: no  Sedentary lifestyle: no  Current tabacco smoking: no  History of thyroid disorder: no  Calcuim and Vitmin D supplements: calicum carbonate gummies  Other supplement or bone treatment: Prolia   Medication use (PPI, epileptic medications etc): no  Early menopause (female): around 47.     Past Medical/Surgical History:  Past Medical History:   Diagnosis Date     Hypertension      Past Surgical History:   Procedure Laterality Date     LASIK BILATERAL Bilateral 2012    Dr Chavez       Allergies:  Allergies   Allergen Reactions     Iohexol Hives     Pt experienced sneeze, itching followed by hives post 100cc Omni 350 for CT Scan.   Observed with IV in place x 45 minutes.   Given 25mg PO Benadryl with OK for 50mg to be taken every 8 hours for the next 24 hours per Dr Rausch.     JRS       Current Medications   Current Outpatient Medications   Medication     denosumab (PROLIA) 60 MG/ML SOLN injection     losartan (COZAAR) 50 MG tablet     lovastatin (MEVACOR) 20 MG tablet     mometasone (ELOCON) 0.1 % solution (lotion)     Calcium Citrate-Vitamin D (CITRACAL + D) 250-200 MG-UNIT TABS     Multiple Vitamins-Minerals (ALIVE WOMENS GUMMY PO)     No current facility-administered medications for this visit.        Family History:  Family History   Problem Relation Age of Onset     No Known Problems Mother      No Known Problems Father      No Known Problems Maternal Grandmother      No Known Problems Maternal Grandfather      No Known Problems Paternal Grandmother      No Known Problems Paternal  Grandfather      Coronary Artery Disease Brother      No Known Problems Sister      No Known Problems Son      No Known Problems Daughter      No Known Problems Maternal Half-Brother      No Known Problems Maternal Half-Sister      No Known Problems Paternal Half-Brother      No Known Problems Paternal Half-Sister      No Known Problems Niece      No Known Problems Nephew      No Known Problems Cousin      No Known Problems Other      Diabetes No family hx of      Hypertension No family hx of      Hyperlipidemia No family hx of      Cerebrovascular Disease No family hx of      Breast Cancer No family hx of      Colon Cancer No family hx of      Prostate Cancer No family hx of      Other Cancer No family hx of      Depression No family hx of      Anxiety Disorder No family hx of      Mental Illness No family hx of      Substance Abuse No family hx of      Anesthesia Reaction No family hx of      Asthma No family hx of      Osteoporosis No family hx of      Genetic Disorder No family hx of      Thyroid Disease No family hx of      Obesity No family hx of      Unknown/Adopted No family hx of        Social History:  Social History     Tobacco Use     Smoking status: Never Smoker     Smokeless tobacco: Never Used   Substance Use Topics     Alcohol use: No   lives with , adult kids (37, 35). 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     ROS:  Full review of systems taken with the help of the intake sheet. Otherwise a complete 14 point review of systems was taken and is negative unless stated in the history above.    Physical Exam:   Blood pressure (!) 166/80, pulse 85, weight 50.5 kg (111 lb 4.8 oz), SpO2 99 %, not currently breastfeeding.    General: well appearing, no acute distress, pleasant and conversant,   Mental Status/neuro: alert and oriented  Face: symmetrical, normal facial color  Eyes: anicteric, PERRL, no proptosis or lid lag  Neck: suppler, no lymphadenopahty  Thyroid: normal size and texture, no nodule  palpable, no bruits  Back: No scoliosis no kyphosis  Heart: regular rhythm, S1S2, no murmur appreciated  Lung: clear to auscultation bilaterally  Abdomen: soft, NT/ND, no hepatomegaly  Legs: no swelling or edema      Labs : I reviewed data from epic and extract and summarize the pertinent data here.   Lab Results   Component Value Date     07/23/2018      Lab Results   Component Value Date    POTASSIUM 4.0 07/23/2018     Lab Results   Component Value Date    CHLORIDE 107 07/23/2018     Lab Results   Component Value Date    DEVAN 8.3 07/23/2018     Lab Results   Component Value Date    CO2 29 07/23/2018     Lab Results   Component Value Date    BUN 20 07/23/2018     Lab Results   Component Value Date    CR 0.70 07/23/2018     Lab Results   Component Value Date    GLC 90 07/23/2018     Lab Results   Component Value Date    TSH 2.35 07/23/2018     Lab Results   Component Value Date    T4 0.96 06/28/2018       Bone Denisty: 8/4/2017: I also personally reviewed the original images and explained to the patient .      1.  This patient's T-score meets the World Health Organization (WHO)   criteria for osteoporosis at one or more measured sites (T-score -2.5 or   below).  The risk of osteoporotic fracture increases approximately   two-fold for each 1.0 SD decrease in T-score.    2.  Moderate levoscoliosis of lumbar spine.       COMPARISON:  Comparison with previous study dated 06/17/2015 shows:    There was a statistically significant increase in the bone mineral density   in the spine.    There was no statistically significant change in the bone mineral density   in the right hip.    There was a statistically significant decrease in the bone mineral density   in the left hip.      Comparison with baseline study dated 09/05/2013 shows:    There was a statistically significant increase in the bone mineral density   in the spine.    There was no statistically significant change in the bone mineral density   in the right  hip.    There was a statistically significant decrease in the bone mineral density   in the left hip.      Statistical significance is determined by the least significant change   (LSC) for the scanner and operators at this facility.    FRAX is not reported because this patient's T-score meets the World Health   Organization (WHO) criteria for osteoporosis and this patient is currently   taking medication for abnormal bone density.    The scan details are [facility].    Radha Navarro M.D.    Breast/Diagnostic Radiologist  [facility]  [email or web address]  SJA:collin  R:8/4/2017 T:8/4/2017   Result Narrative   DIAGNOSTIC DXA BONE MINERAL DENSITY, 8/4/2017    CLINICAL HISTORY:  This is a 67-year-old female patient.      RISK FACTORS:  The patient has estrogen deficiency.  The patient has a   history of osteoporosis based on a prior BMD study.  The patient has a   body weight under 127 pounds.  The patient has the following medical   condition(s):  Vitamin D deficiency.  The patient has taken for at least   one month the following medications in the last year:  Medications for   osteoporosis.    TECHNIQUE:  Dual-energy x-ray absorptiometry system (axial skeleton).  The   patient was scanned on a GE Lunar Prodigy scanner, fast-array scan mode.    The study was technically adequate.      FINDINGS:       BMD T-Score Z-Score   Lumbar Spine (L1 to L4) 0.896 g/cm2 -2.4 -0.2   Right Hip Femoral Neck 0.758 g/cm2 -2.0 -0.1   Right Total Hip (BMD for comparison to prior) 0.773 g/cm2 -1.9 -0.1   Left Hip Femoral Neck 0.688 g/cm2 -2.5 -0.6   Left Total Hip (BMD for comparison to prior) 0.757 g/cm2 -2.0 -0.2     6/17/2015    DIAGNOSTIC DXA BONE MINERAL DENSITY, 6/17/2015    CLINICAL HISTORY:  This is a 64-year-old female patient.      RISK FACTORS:  The patient has estrogen deficiency.  The patient has a   history of low bone density based on a prior BMD study  (osteopenia).  The   patient has a body weight under 127 pounds.      TECHNIQUE:  Dual-energy x-ray absorptiometry system (axial skeleton).  The   patient was scanned on a GE Lunar Prodigy scanner, fast-array scan mode.    The study was technically adequate.      FINDINGS:       BMD T-Score Z-Score   Lumbar Spine (L1 to L4) 0.861 g/cm2 -2.7 -0.6   Right Hip Femoral Neck 0.748 g/cm2 -2.1 -0.3   Right Total Hip   0.739 g/cm2 -2.1 -0.6   Left Hip Femoral Neck 0.722 g/cm2 -2.3 -0.5   Left Total Hip   0.808 g/cm2 -1.6 0.0         Again, thank you for allowing me to participate in the care of your patient.        Sincerely,        Deanna Lundberg MD

## 2021-02-18 ENCOUNTER — INFUSION THERAPY VISIT (OUTPATIENT)
Dept: INFUSION THERAPY | Facility: CLINIC | Age: 71
End: 2021-02-18
Payer: COMMERCIAL

## 2021-02-18 VITALS
SYSTOLIC BLOOD PRESSURE: 159 MMHG | HEIGHT: 60 IN | TEMPERATURE: 97.3 F | HEART RATE: 73 BPM | OXYGEN SATURATION: 98 % | DIASTOLIC BLOOD PRESSURE: 82 MMHG | WEIGHT: 114.9 LBS | BODY MASS INDEX: 22.56 KG/M2 | RESPIRATION RATE: 14 BRPM

## 2021-02-18 DIAGNOSIS — M81.0 SENILE OSTEOPOROSIS: Primary | ICD-10-CM

## 2021-02-18 DIAGNOSIS — T50.995S ADVERSE EFFECT OF OTHER DRUGS, MEDICAMENTS AND BIOLOGICAL SUBSTANCES, SEQUELA: Primary | ICD-10-CM

## 2021-02-18 DIAGNOSIS — M81.0 SENILE OSTEOPOROSIS: ICD-10-CM

## 2021-02-18 DIAGNOSIS — T50.995S ADVERSE EFFECT OF OTHER DRUGS, MEDICAMENTS AND BIOLOGICAL SUBSTANCES, SEQUELA: ICD-10-CM

## 2021-02-18 LAB
CALCIUM SERPL-MCNC: 9.1 MG/DL (ref 8.5–10.1)
CREAT SERPL-MCNC: 0.77 MG/DL (ref 0.52–1.04)
GFR SERPL CREATININE-BSD FRML MDRD: 78 ML/MIN/{1.73_M2}
PHOSPHATE SERPL-MCNC: 4.5 MG/DL (ref 2.5–4.5)

## 2021-02-18 PROCEDURE — 82310 ASSAY OF CALCIUM: CPT | Performed by: INTERNAL MEDICINE

## 2021-02-18 PROCEDURE — 99207 PR NO CHARGE NURSE ONLY: CPT

## 2021-02-18 PROCEDURE — 84100 ASSAY OF PHOSPHORUS: CPT | Performed by: INTERNAL MEDICINE

## 2021-02-18 PROCEDURE — 36415 COLL VENOUS BLD VENIPUNCTURE: CPT | Performed by: INTERNAL MEDICINE

## 2021-02-18 PROCEDURE — 96372 THER/PROPH/DIAG INJ SC/IM: CPT | Performed by: INTERNAL MEDICINE

## 2021-02-18 PROCEDURE — 82565 ASSAY OF CREATININE: CPT | Performed by: INTERNAL MEDICINE

## 2021-02-18 ASSESSMENT — PAIN SCALES - GENERAL: PAINLEVEL: NO PAIN (0)

## 2021-02-18 ASSESSMENT — MIFFLIN-ST. JEOR: SCORE: 957.68

## 2021-02-18 NOTE — PROGRESS NOTES
Infusion Nursing Note:  Bree Carrillo presents today for   Chief Complaint   Patient presents with     Allied Health Visit     Prolia injection   .    Patient seen by provider today: No   present during visit today: Not Applicable.    Note: Patient assessed today by Eulalia Serra RN.      No Intravenous access/labs at this visit.    Treatment Conditions:  Lab Results   Component Value Date     09/10/2020                   Lab Results   Component Value Date    POTASSIUM 4.0 09/10/2020           No results found for: MAG         Lab Results   Component Value Date    CR 0.77 02/18/2021                   Lab Results   Component Value Date    DEVAN 9.1 02/18/2021                Lab Results   Component Value Date    BILITOTAL 0.4 09/10/2020           Lab Results   Component Value Date    ALBUMIN 3.6 09/10/2020                    Lab Results   Component Value Date    ALT 28 09/10/2020           Lab Results   Component Value Date    AST 20 09/10/2020       Results reviewed, labs MET treatment parameters, ok to proceed with treatment.      Post Infusion Assessment:  Patient tolerated injection without incident.  Site patent and intact, free from redness, edema or discomfort.       Discharge Plan:   Patient discharged in stable condition accompanied by: self.  Departure Mode: Ambulatory.    Misty Rausch CMA

## 2021-03-03 ENCOUNTER — TELEPHONE (OUTPATIENT)
Dept: FAMILY MEDICINE | Facility: CLINIC | Age: 71
End: 2021-03-03

## 2021-03-03 NOTE — LETTER
March 4, 2021      Bree Carrillo  [address]  [facility] [address]        Dear Reina Giron CNP is now located at:    [address] [place], [place], [address]  Phone: [phone]  Fax: [phone]    You are due for a physical on or after 9/10/2021.      Sincerely,    AYANNA Portillo CMA CNP

## 2021-03-03 NOTE — TELEPHONE ENCOUNTER
Reason for Call:  Other physical/mammogram     Detailed comments: patient is calling to ask provider if she needs to have a physical and mammogram. Patient did have a DEXA with blood work wishing not to have blood work done again if not needed.   Please mail new address for provider to her home address which has been verified.     Call patient to discuss  Thank you     Phone Number Patient can be reached at: Home number on file [phone] (home)    Best Time: any    Can we leave a detailed message on this number? YES    Call taken on 3/3/2021 at 12:57 PM by Etta Saez

## 2021-03-04 NOTE — TELEPHONE ENCOUNTER
Called pt and let her know that she is not due to PE until September. Sent letter in mail with our clinic address

## 2021-03-04 NOTE — TELEPHONE ENCOUNTER
She does not need a physical for fasting labs as she did this in September so is not due for this until next September   She also saw endocrinology so could be endocrinology had requested further labs

## 2021-03-30 ENCOUNTER — TELEPHONE (OUTPATIENT)
Dept: ENDOCRINOLOGY | Facility: CLINIC | Age: 71
End: 2021-03-30

## 2021-03-30 DIAGNOSIS — E03.8 SUBCLINICAL HYPOTHYROIDISM: Primary | ICD-10-CM

## 2021-03-30 NOTE — TELEPHONE ENCOUNTER
Writer returned patient's call. Patient states that Dr. Lundberg started levothyroxine 50 mcg daily. Patient wanted to know how long she is to be taking the levothyroxine?     Reviewed 2/12/21 progress note from Dr. Lundberg. Uncertain when patient is to repeat labs. Will review with Dr. Lundberg.    Patient also wants to know why she is taking this medication? Why does Dr. Lundberg want her on the medication? Patient wants to review further.      Writer will send to Dr. Lundebrg to determine if she is able to call patient and/or set up a phone visit.       Yoselin Meza RN  Endocrine Care Coordinator  [facility]

## 2021-03-30 NOTE — TELEPHONE ENCOUNTER
[facility] Call Center    Phone Message    May a detailed message be left on voicemail: yes     Reason for Call: The patient would like a call.  She has questions regarding her Levothyroxine.  Please advise.  Thank you.     Action Taken: Message routed to:  Adult Clinics: Endocrinology p [phone]    Travel Screening: Not Applicable

## 2021-04-01 NOTE — TELEPHONE ENCOUNTER
Per Dr. Lundberg:    Since lab showed thryoid hormone is slight lower side (I mean TSH elevated). Dose is very minimum (almost pediatric dose), my intention is this may help for your quality of life.   If not interested in, we can certainily not to start.   If you are interested in starting, then next blood work in 3 month, (TSH, free T4)     Deanna       Patient advised. Patient verbalizes understanding and agrees to plan. Scheduled patient for labs.       Yoselin Meza RN  Endocrine Care Coordinator  [facility]

## 2021-05-24 DIAGNOSIS — E03.8 SUBCLINICAL HYPOTHYROIDISM: ICD-10-CM

## 2021-05-24 LAB
T4 FREE SERPL-MCNC: 0.93 NG/DL (ref 0.76–1.46)
TSH SERPL DL<=0.005 MIU/L-ACNC: 3.31 MU/L (ref 0.4–4)

## 2021-05-24 PROCEDURE — 84443 ASSAY THYROID STIM HORMONE: CPT | Performed by: INTERNAL MEDICINE

## 2021-05-24 PROCEDURE — 36415 COLL VENOUS BLD VENIPUNCTURE: CPT | Performed by: INTERNAL MEDICINE

## 2021-05-24 PROCEDURE — 84439 ASSAY OF FREE THYROXINE: CPT | Performed by: INTERNAL MEDICINE

## 2021-05-26 ENCOUNTER — OFFICE VISIT (OUTPATIENT)
Dept: FAMILY MEDICINE | Facility: CLINIC | Age: 71
End: 2021-05-26
Payer: COMMERCIAL

## 2021-05-26 ENCOUNTER — TELEPHONE (OUTPATIENT)
Dept: ENDOCRINOLOGY | Facility: CLINIC | Age: 71
End: 2021-05-26

## 2021-05-26 VITALS
WEIGHT: 111.8 LBS | RESPIRATION RATE: 16 BRPM | DIASTOLIC BLOOD PRESSURE: 84 MMHG | BODY MASS INDEX: 25.87 KG/M2 | HEIGHT: 55 IN | SYSTOLIC BLOOD PRESSURE: 180 MMHG | TEMPERATURE: 97.8 F | OXYGEN SATURATION: 100 % | HEART RATE: 68 BPM

## 2021-05-26 DIAGNOSIS — I10 HYPERTENSION WITH GOAL BLOOD PRESSURE LESS THAN 140/90: Primary | ICD-10-CM

## 2021-05-26 DIAGNOSIS — M81.0 SENILE OSTEOPOROSIS: ICD-10-CM

## 2021-05-26 DIAGNOSIS — E78.5 HYPERLIPIDEMIA WITH TARGET LDL LESS THAN 130: ICD-10-CM

## 2021-05-26 DIAGNOSIS — E06.3 HYPOTHYROIDISM DUE TO HASHIMOTO'S THYROIDITIS: ICD-10-CM

## 2021-05-26 PROCEDURE — 99214 OFFICE O/P EST MOD 30 MIN: CPT | Performed by: FAMILY MEDICINE

## 2021-05-26 RX ORDER — LOSARTAN POTASSIUM 50 MG/1
50 TABLET ORAL 2 TIMES DAILY
Qty: 180 TABLET | Refills: 1 | Status: SHIPPED | OUTPATIENT
Start: 2021-05-26 | End: 2021-10-22

## 2021-05-26 RX ORDER — LOVASTATIN 20 MG
20 TABLET ORAL AT BEDTIME
Qty: 90 TABLET | Refills: 3 | Status: CANCELLED | OUTPATIENT
Start: 2021-05-26

## 2021-05-26 RX ORDER — LOVASTATIN 20 MG
20 TABLET ORAL AT BEDTIME
Qty: 90 TABLET | Refills: 3 | Status: SHIPPED | OUTPATIENT
Start: 2021-05-26 | End: 2022-03-30 | Stop reason: ALTCHOICE

## 2021-05-26 ASSESSMENT — MIFFLIN-ST. JEOR: SCORE: 868.21

## 2021-05-26 NOTE — PATIENT INSTRUCTIONS
Call to schedule for annual wellness exam and fasting labs in 9/2021  Increase LOSARTAN 50 mg to twice daily due to elevated blood pressure  Schedule for BP recheck in 2 weeks with the staff

## 2021-05-26 NOTE — PROGRESS NOTES
Assessment & Plan     Hypertension with goal blood pressure less than 140/90  BP Readings from Last 6 Encounters:   05/26/21 (!) 180/84   02/18/21 (!) 159/82   02/12/21 (!) 166/80   09/10/20 125/60   12/24/19 (!) 158/72   07/25/19 111/70     Blood pressure is not at goal  Initial blood pressure was 171/79  Rechecked-180/84  Recommended to increase the dose of losartan from 50 mg once a day to 50 mg twice a day  Continue with regular exercises, low-salt diet  Follow-up for recheck with the staff in 2 weeks  Reassured patient that she does not need any EKG at this time  Patient verbalised understanding and is agreeable to the plan.    - losartan (COZAAR) 50 MG tablet; Take 1 tablet (50 mg) by mouth 2 times daily New dose    Hyperlipidemia with target LDL less than 130  LDL Cholesterol Calculated   Date Value Ref Range Status   09/10/2020 103 (H) <100 mg/dL Final     Comment:     Above desirable:  100-129 mg/dl  Borderline High:  130-159 mg/dL  High:             160-189 mg/dL  Very high:       >189 mg/dl       Not due for fasting lab recheck until the September  - lovastatin (MEVACOR) 20 MG tablet; Take 1 tablet (20 mg) by mouth At Bedtime    Senile osteoporosis  On Prolia, sees Dr. Lundberg in endocrinology  Continue with calcium and vitamin D supplements, weightbearing exercises including daily walking  Reviewed her DEXA scan from March 2020 per patient's request showing osteoporosis  We will get the DEXA scan recheck in February 2022 before her follow-up with Dr. Lundberg    Hypothyroidism due to Hashimoto's thyroiditis  TSH   Date Value Ref Range Status   05/24/2021 3.31 0.40 - 4.00 mU/L Final   02/12/2021 4.27 (H) 0.40 - 4.00 mU/L Final   11/13/2020 2.65 0.40 - 4.00 mU/L Final   10/14/2020 5.22 (H) 0.40 - 4.00 mU/L Final   09/10/2020 4.17 (H) 0.40 - 4.00 mU/L Final     T4 Free   Date Value Ref Range Status   05/24/2021 0.93 0.76 - 1.46 ng/dL Final   02/12/2021 0.97 0.76 - 1.46 ng/dL Final   11/13/2020 1.00 0.76 -  "1.46 ng/dL Final   10/14/2020 0.90 0.76 - 1.46 ng/dL Final   09/10/2020 1.05 0.76 - 1.46 ng/dL Final     Patient is wondering if she needs to continue with her thyroid medication  Her symptoms of hair loss has improved  Reviewed normal thyroid labs and reassured patient  She will continue with her current dose of levothyroxine  Emphasized on continuing with endocrinology follow-up as recommended  Patient verbalised understanding and is agreeable to the plan.        Review of the result(s) of each unique test - tsh, t4, flp, dexa  31 minutes spent on the date of the encounter doing chart review, review of test results, interpretation of tests, patient visit and documentation        BMI:   Estimated body mass index is 25.75 kg/m  as calculated from the following:    Height as of this encounter: 1.403 m (4' 7.25\").    Weight as of this encounter: 50.7 kg (111 lb 12.8 oz).       Chart documentation done in part with Dragon Voice recognition Software. Although reviewed after completion, some word and grammatical error may remain.    See Patient Instructions    Return in about 53 weeks (around 6/1/2022) for Annual Wellness Visit.    Tobias Carlos MD  [facility] MARIBELL Giron is a 70 year old who presents for the following health issues     Patient is new to the provider,  She was originally scheduled for annual wellness exam, but patient just had the wellness exam in September 2020 along with fasting labs  She is also here with other concerns including follow-up on her osteoporosis,, question on whether she needs an EKG and for medication recheck  Patient states her relative had EKG at her visit and she is asking this provider whether she needs EKG  Patient does not have underlying history of heart disease, current concerns for chest pain, chest pressure, shortness of breath, palpitations, dizziness or other symptoms of any cardiac or respiratory concern  She never had a EKG in the " past either  Patient has a past medical history significant for hypertension, hyperlipidemia  She was having subclinical hypothyroidism, was seen by Dr. Lundberg in endocrinology, when she was started on levothyroxine 50 MCG 3 months ago  Patient recently had a lab recheck on thyroid, she wants to know if she has to continue with the medication.  She is currently taking Prolia and calcium and vitamin D supplements for osteoporosis  Patient is questioning when she has to get the repeat DEXA scan  Patient reported improved hair loss after starting on the thyroid replacement    Patient is not due for her annual wellness exam until the September  Hence this visit was converted to an office visit  Patient verbalised understanding and is agreeable to the plan.    HPI     Hypertension Follow-up      Do you check your blood pressure regularly outside of the clinic? No     Are you following a low salt diet? Yes    Are your blood pressures ever more than 140 on the top number (systolic) OR more   than 90 on the bottom number (diastolic), for example 140/90?  N/A      How many servings of fruits and vegetables do you eat daily?  2-3    On average, how many sweetened beverages do you drink each day (Examples: soda, juice, sweet tea, etc.  Do NOT count diet or artificially sweetened beverages)?   0    How many days per week do you exercise enough to make your heart beat faster? 7    How many minutes a day do you exercise enough to make your heart beat faster? 30 - 60    How many days per week do you miss taking your medication? 0        Review of Systems   CONSTITUTIONAL: NEGATIVE for fever, chills, change in weight  RESP: NEGATIVE for significant cough or SOB  CV: NEGATIVE for chest pain, palpitations or peripheral edema  CV: Hx HTN  GI: NEGATIVE for nausea, abdominal pain, heartburn, or change in bowel habits  MUSCULOSKELETAL: History of osteoporosis  NEURO: NEGATIVE for weakness, dizziness or paresthesias  ENDOCRINE: History of  "hypothyroidism  HEME/ALLERGY/IMMUNE: NEGATIVE for bleeding problems  PSYCHIATRIC: NEGATIVE for changes in mood or affect      Objective    BP (!) 180/84   Pulse 68   Temp 97.8  F (36.6  C) (Oral)   Resp 16   Ht 1.403 m (4' 7.25\")   Wt 50.7 kg (111 lb 12.8 oz)   SpO2 100%   BMI 25.75 kg/m    Body mass index is 25.75 kg/m .  Physical Exam   GENERAL: healthy, alert and no distress  NECK: no adenopathy, no asymmetry, masses, or scars and thyroid normal to palpation  RESP: lungs clear to auscultation - no rales, rhonchi or wheezes  CV: regular rate and rhythm, normal S1 S2, no S3 or S4, no murmur, click or rub, no peripheral edema and peripheral pulses strong  MS: no gross musculoskeletal defects noted, no edema  SKIN: no suspicious lesions or rashes  NEURO: Normal strength and tone, mentation intact and speech normal  PSYCH: mentation appears normal, affect normal/bright    TSH   Date Value Ref Range Status   05/24/2021 3.31 0.40 - 4.00 mU/L Final   02/12/2021 4.27 (H) 0.40 - 4.00 mU/L Final   11/13/2020 2.65 0.40 - 4.00 mU/L Final   10/14/2020 5.22 (H) 0.40 - 4.00 mU/L Final   09/10/2020 4.17 (H) 0.40 - 4.00 mU/L Final     T4 Free   Date Value Ref Range Status   05/24/2021 0.93 0.76 - 1.46 ng/dL Final   02/12/2021 0.97 0.76 - 1.46 ng/dL Final   11/13/2020 1.00 0.76 - 1.46 ng/dL Final   10/14/2020 0.90 0.76 - 1.46 ng/dL Final   09/10/2020 1.05 0.76 - 1.46 ng/dL Final         Chart forwarded to PCP for FYI           "

## 2021-05-26 NOTE — TELEPHONE ENCOUNTER
Component      Latest Ref Rng & Units 5/24/2021   T4 Free      0.76 - 1.46 ng/dL 0.93   TSH      0.40 - 4.00 mU/L 3.31     Patient reports she did her lab work on Monday, she also has not taken the levothyroxine for 1 week. She wants to know if she should continue the levothyroxine or ok to discontinue. Will forward to Dr. Lundberg to review results and advise.    Joy Dee, [facility]  Adult Endocrinology  [facility]

## 2021-05-26 NOTE — TELEPHONE ENCOUNTER
[facility] Call Center    Phone Message    May a detailed message be left on voicemail: yes     Reason for Call: Medication Question or concern regarding medication   Prescription Clarification  Name of Medication: levothyroxine (SYNTHROID/LEVOTHROID) 50 MCG tablet  Prescribing Provider: Deanna Lundberg MD      Patient requesting a call back to discuss if she should continue taking medication? Please call back to advise.          Action Taken: Message routed to:  Adult Clinics: Endocrinology p [phone]    Travel Screening: Not Applicable

## 2021-05-26 NOTE — PROGRESS NOTES
"  SUBJECTIVE:   Bree Carrillo is a 70 year old female who presents for Preventive Visit.    {Split Bill scripting  The purpose of this visit is to discuss your medical history and prevent health problems before you are sick. You may be responsible for a co-pay, coinsurance, or deductible if your visit today includes services such as checking on a sore throat, having an x-ray or lab test, or treating and evaluating a new or existing condition :198077}  Patient has been advised of split billing requirements and indicates understanding: Yes  Are you in the first 12 months of your Medicare Part B coverage?  No    Physical Health:    In general, how would you rate your overall physical health? good    Outside of work, how many days during the week do you exercise? 2-3 days/week    Outside of work, approximately how many minutes a day do you exercise?30-45 minutes    If you drink alcohol do you typically have >3 drinks per day or >7 drinks per week? No    Do you usually eat at least 4 servings of fruit and vegetables a day, include whole grains & fiber and avoid regularly eating high fat or \"junk\" foods? NO    Do you have any problems taking medications regularly?  No    Do you have any side effects from medications? none    Needs assistance for the following daily activities: no assistance needed    Which of the following safety concerns are present in your home?  none identified     Hearing impairment: No    In the past 6 months, have you been bothered by leaking of urine? no    Mental Health:    In general, how would you rate your overall mental or emotional health? excellent  PHQ-2 Score:      Do you feel safe in your environment? Yes    Have you ever done Advance Care Planning? (For example, a Health Directive, POLST, or a discussion with a medical provider or your loved ones about your wishes): Yes, patient states has an Advance Care Planning document and will bring a copy to the clinic.    Additional concerns to " address?  None      Fall risk:  Fallen 2 or more times in the past year?: No  Any fall with injury in the past year?: No    Cognitive Screenin) Repeat 3 items (Leader, Season, Table)    2) Clock draw: NORMAL  3) 3 item recall: Recalls 3 objects  Results: 3 items recalled: COGNITIVE IMPAIRMENT LESS LIKELY    Mini-CogTM Copyright ROMINA Mckeon. Licensed by the author for use in [facility]; reprinted with permission (jamir@[facility]). All rights reserved.      Do you have sleep apnea, excessive snoring or daytime drowsiness?: None        Reviewed and updated as needed this visit by clinical staff  Tobacco  Allergies  Meds   Med Hx  Surg Hx  Fam Hx  Soc Hx        Reviewed and updated as needed this visit by Provider                Social History     Tobacco Use     Smoking status: Never Smoker     Smokeless tobacco: Never Used   Substance Use Topics     Alcohol use: No                           Current providers sharing in care for this patient include:   Patient Care Team:  Reina Corley APRN CNP as PCP - General (Family Practice)  Reina Corley APRN CNP as Assigned PCP  Deanna Lundberg MD as Assigned Endocrinology Provider    The following health maintenance items are reviewed in Epic and correct as of today:  Health Maintenance   Topic Date Due     ANNUAL REVIEW OF HM ORDERS  Never done     EYE EXAM  10/21/2020     MEDICARE ANNUAL WELLNESS VISIT  09/10/2021     FALL RISK ASSESSMENT  09/10/2021     MAMMO SCREENING  11/10/2021     DTAP/TDAP/TD IMMUNIZATION (2 - Td) 2022     COLORECTAL CANCER SCREENING  2024     LIPID  09/10/2025     ADVANCE CARE PLANNING  2025     DEXA  2035     HEPATITIS C SCREENING  Completed     PHQ-2  Completed     INFLUENZA VACCINE  Completed     Pneumococcal Vaccine: 65+ Years  Completed     ZOSTER IMMUNIZATION  Completed     COVID-19 Vaccine  Completed     IPV IMMUNIZATION  Aged Out     MENINGITIS IMMUNIZATION  Aged Out     HEPATITIS B  "IMMUNIZATION  Aged Out     {Chronicprobdata (Optional):331329}  {Decision Support (Optional):966870}    ROS:  {ROS COMP:832384}    OBJECTIVE:   There were no vitals taken for this visit. Estimated body mass index is 22.44 kg/m  as calculated from the following:    Height as of 21: 1.524 m (5').    Weight as of 21: 52.1 kg (114 lb 14.4 oz).  EXAM:   {Exam :473225}    {Diagnostic Test Results (Optional):703087::\"Diagnostic Test Results:\",\"Labs reviewed in Epic\"}    ASSESSMENT / PLAN:   {Diag Picklist:018555}    Patient has been advised of split billing requirements and indicates understanding: {YES / NO:760623::\"Yes\"}    COUNSELING:  {Medicare Counselin}    Estimated body mass index is 22.44 kg/m  as calculated from the following:    Height as of 21: 1.524 m (5').    Weight as of 21: 52.1 kg (114 lb 14.4 oz).    {Weight Management Plan (ACO) Complete if BMI is abnormal-  Ages 18-64  BMI >24.9.  Age 65+ with BMI <23 or >30 (Optional):792726}    She reports that she has never smoked. She has never used smokeless tobacco.    Appropriate preventive services were discussed with this patient, including applicable screening as appropriate for cardiovascular disease, diabetes, osteopenia/osteoporosis, and glaucoma.  As appropriate for age/gender, discussed screening for colorectal cancer, prostate cancer, breast cancer, and cervical cancer. Checklist reviewing preventive services available has been given to the patient.    Reviewed patients plan of care and provided an AVS. The {CarePlan:342628} for Bree meets the Care Plan requirement. This Care Plan has been established and reviewed with the {PATIENT, FAMILY MEMBER, CAREGIVER:666247}.    Counseling Resources:  ATP IV Guidelines  Pooled Cohorts Equation Calculator  Breast Cancer Risk Calculator  BRCA-Related Cancer Risk Assessment: FHS-7 Tool  FRAX Risk Assessment  ICSI Preventive Guidelines  Dietary Guidelines for Americans, 2010  USDA's " MyPlate  ASA Prophylaxis  Lung CA Screening    Tobias Carlos MD  [facility]

## 2021-05-28 NOTE — TELEPHONE ENCOUNTER
Patient calling back wanting to know if she is able to  the rx that was sent for her thyroid. Bree is requesting a call back. Thank you!

## 2021-05-28 NOTE — TELEPHONE ENCOUNTER
Per Dr. Lundberg:    Better to take that is my recommendation.     [email or web address]         Writer contacted patient to review. Patient advised of recommendations from Dr. Lundberg. Patient verbalizes understanding and agrees to plan. Patient also notes that she talked to Dr. Carlos about it as well.      Yoselin Meza RN  Endocrine Care Coordinator  [facility]

## 2021-06-03 ENCOUNTER — TELEPHONE (OUTPATIENT)
Dept: FAMILY MEDICINE | Facility: CLINIC | Age: 71
End: 2021-06-03

## 2021-06-03 NOTE — TELEPHONE ENCOUNTER
Pt calling to confirm her losartan was increased to 50 mg twice a day.  See ov not e5/26/21. Confirmed dose was increased and she should take twice a day and recheck in two weeks.  Shasha BAGLEYN, RN

## 2021-06-24 ENCOUNTER — ALLIED HEALTH/NURSE VISIT (OUTPATIENT)
Dept: NURSING | Facility: CLINIC | Age: 71
End: 2021-06-24
Payer: COMMERCIAL

## 2021-06-24 VITALS — DIASTOLIC BLOOD PRESSURE: 78 MMHG | SYSTOLIC BLOOD PRESSURE: 142 MMHG

## 2021-06-24 DIAGNOSIS — Z01.30 BP CHECK: Primary | ICD-10-CM

## 2021-06-24 PROCEDURE — 99207 PR NO CHARGE NURSE ONLY: CPT

## 2021-06-24 NOTE — PROGRESS NOTES
Bree Carrillo is a 70 year old patient who comes in today for a Blood Pressure check.  Initial BP:  142/78     Data Unavailable  Disposition: provider notified while patient in the clinic

## 2021-07-01 ENCOUNTER — NURSE TRIAGE (OUTPATIENT)
Dept: NURSING | Facility: CLINIC | Age: 71
End: 2021-07-01

## 2021-07-01 DIAGNOSIS — E03.8 SUBCLINICAL HYPOTHYROIDISM: ICD-10-CM

## 2021-07-01 DIAGNOSIS — E78.5 HYPERLIPIDEMIA WITH TARGET LDL LESS THAN 130: Primary | ICD-10-CM

## 2021-07-01 DIAGNOSIS — Z13.1 SCREENING FOR DIABETES MELLITUS (DM): ICD-10-CM

## 2021-07-01 DIAGNOSIS — I10 HYPERTENSION WITH GOAL BLOOD PRESSURE LESS THAN 140/90: ICD-10-CM

## 2021-07-01 NOTE — TELEPHONE ENCOUNTER
1. Please check on losartan dose is 50 mg twice a day?  Continue dose and make follow up within a month not on the ibuprofen as that does increase her blood pressure     2. OK to tylenol and ibuprofen   Make sure to take ibuprofen with food as long as not for extended time should be OK     3. Not sure when the next prolia as that is done by Dr Lundberg her endocrinologist

## 2021-07-01 NOTE — TELEPHONE ENCOUNTER
1. Please check on losartan dose is 50 mg twice a day?  Continue dose and make follow up within a month not on the ibuprofen as that does increase her blood pressure   Yes taking 50 mg BID     2. OK to tylenol and ibuprofen   Make sure to take ibuprofen with food as long as not for extended time should be OK   Verbalized understanding    3. Not sure when the next prolia as that is done by Dr Lundberg her endocrinologist   July 15th     Reina Corley APRN, CNP-    4. Bree wanted to go through all meds and make sure we had matching instructions- discovered she has been taking Lovastantin 20mg taking 2 times a day one in am and one at night VS order states take daily at bedtime - she is certain was instructed to do so, can you please clarify     5. Bree requesting please order routine labs & EKG (she wants to know her baseline) for same time as wellness exam if possible scheduled for 9/20/21     Rita Fisher RN, BSN, CMSRN  [facility]

## 2021-07-01 NOTE — TELEPHONE ENCOUNTER
RN triage   Call from pt   Pt has questions for PCP :  1.  Got 2 teeth extracted last week -- felling some better - still some pain and diff sleep-- taking ibuprofen and tylenol --- is that OK ?  2.  Scheduled for teeth implants 8/16--- pt wants to know when next prolia shot due and should she have it before implants?  3.  Had BP check at clinic last week -- wants PCP advice - is BP OK?  When should she come to clinic for next BP check --- no symptoms -- feeling well except for improving teeth extraction pain   Please advise     Shefali Castellon RN  [facility]  Triage Nurse Advisor         Reason for Disposition    Caller has NON-URGENT medication question about med that PCP prescribed and triager unable to answer question    Protocols used: MEDICATION QUESTION CALL-A-[place]

## 2021-07-02 ENCOUNTER — OFFICE VISIT (OUTPATIENT)
Dept: OPHTHALMOLOGY | Facility: CLINIC | Age: 71
End: 2021-07-02
Attending: OPHTHALMOLOGY
Payer: COMMERCIAL

## 2021-07-02 DIAGNOSIS — H25.813 COMBINED FORM OF AGE-RELATED CATARACT, BOTH EYES: ICD-10-CM

## 2021-07-02 DIAGNOSIS — H04.129 DRY EYE: ICD-10-CM

## 2021-07-02 DIAGNOSIS — H43.813 PVD (POSTERIOR VITREOUS DETACHMENT), BILATERAL: ICD-10-CM

## 2021-07-02 DIAGNOSIS — H52.03 HYPEROPIA OF BOTH EYES: Primary | ICD-10-CM

## 2021-07-02 DIAGNOSIS — Z98.890 STATUS POST BILATERAL LASIK SURGERY: ICD-10-CM

## 2021-07-02 DIAGNOSIS — H52.4 PRESBYOPIA: ICD-10-CM

## 2021-07-02 PROCEDURE — G0463 HOSPITAL OUTPT CLINIC VISIT: HCPCS

## 2021-07-02 PROCEDURE — 92015 DETERMINE REFRACTIVE STATE: CPT

## 2021-07-02 PROCEDURE — 92014 COMPRE OPH EXAM EST PT 1/>: CPT | Performed by: OPHTHALMOLOGY

## 2021-07-02 ASSESSMENT — REFRACTION_WEARINGRX
OD_SPHERE: +0.75
OD_CYLINDER: +0.25
OD_AXIS: 045
OS_ADD: +2.50
OD_ADD: +2.50
OS_SPHERE: +1.00
OS_CYLINDER: SPHERE

## 2021-07-02 ASSESSMENT — REFRACTION_MANIFEST
OD_SPHERE: +0.75
OD_ADD: +2.50
OS_CYLINDER: SPHERE
OS_ADD: +2.50
OD_CYLINDER: +0.50
OS_SPHERE: +1.25
OD_AXIS: 025

## 2021-07-02 ASSESSMENT — VISUAL ACUITY
OD_PH_SC: 20/25
OD_SC: 20/40
OS_SC: 20/40
OD_PH_SC+: -3
OS_PH_SC: 20/30
OS_SC+: -2
METHOD: SNELLEN - LINEAR

## 2021-07-02 ASSESSMENT — EXTERNAL EXAM - RIGHT EYE: OD_EXAM: NORMAL

## 2021-07-02 ASSESSMENT — CONF VISUAL FIELD
OS_NORMAL: 1
METHOD: COUNTING FINGERS
OD_NORMAL: 1

## 2021-07-02 ASSESSMENT — SLIT LAMP EXAM - LIDS
COMMENTS: NORMAL
COMMENTS: NORMAL

## 2021-07-02 ASSESSMENT — CUP TO DISC RATIO
OD_RATIO: 0.3
OS_RATIO: 0.3

## 2021-07-02 ASSESSMENT — TONOMETRY
IOP_METHOD: TONOPEN
OS_IOP_MMHG: 15
OD_IOP_MMHG: 17

## 2021-07-02 ASSESSMENT — EXTERNAL EXAM - LEFT EYE: OS_EXAM: NORMAL

## 2021-07-02 NOTE — NURSING NOTE
Chief Complaints and History of Present Illnesses   Patient presents with     Cataract Follow Up     Chief Complaint(s) and History of Present Illness(es)     Cataract Follow Up               Comments     Pt states vision is about the same as a few years ago. No eye pain today.  No flashes or floaters. No redness or dryness.    HERIBERTO Zapata July 2, 2021 9:16 AM

## 2021-07-02 NOTE — TELEPHONE ENCOUNTER
This writer attempted to contact pt on 07/02/21      Reason for call relay provider message below as written and left message to return call to RN.      If patient calls back:   Relay message below as written by Reina Corley APRN CNP, (read verbatim), document that pt called and close encounter      Fatou Ralph RN

## 2021-07-02 NOTE — TELEPHONE ENCOUNTER
Will order fasting labs   Cholesterol med is just once at bedtime   The losartan she was taking twice a day   I am thinking was confusing as is on losartan and lovastatin is the cholesterol medicine  Will place fasting labs she can get those done early at the [facility] if that is easier

## 2021-07-02 NOTE — TELEPHONE ENCOUNTER
Patient called back.   This RN gave her providers message below.   She got her pills bottles out and read them to me so she understands.     Myra BAGLEYN, RN

## 2021-07-02 NOTE — PATIENT INSTRUCTIONS
"Artificial tear drops as needed up to 4 times a day.   Any brand is OK (for example, Refresh, Systane, Blink). Nothing that says \"get the red out\"  "

## 2021-07-02 NOTE — PROGRESS NOTES
HPI:  Bree Carrillo is a 70 year old female presenting for complete eye exam.    Last saw Dr. Arcos in [place] 2019 with NS and cortical cataracts each eye; had updated glasses Rx.    She feels that vision is stable compared to last visit. No eye pain. No flashes/floaters.  No concerns.    Past Ocular History:  LASIK each eye 1990s  Glasses    PMH:  HTN  HLD  Hypothyroidism  Osteoporosis    SH:  Never smoker, no alcohol.  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    FH:  No known family history of blindness, glaucoma, macular degeneration    ASSESSMENT and PLAN:  1. Hyperopia of both eyes  2. Presbyopia  - updated glasses Rx dispensed per patient request    3. Combined form of age-related cataract, both eyes  - NS and cortical spoking each eye  - currently asymptomatic and happy with vision with glasses  - monitor for now    4. Dry eye  - start ATs prn    5. Status post bilateral LASIK surgery  - flaps in good position  - monitor    6. Posterior vitreous detachment, bilateral  - retinas attached  - Reviewed signs/symptoms of retinal tear/detachment including shower of new floaters, flashes, curtain/veil, decreased vision, etc and patient understands to call/come in immediately for these      Follow up in 1 year or sooner PRN        -----------------------------------------------------------------------------------    Attestation:  Complete documentation of historical and exam elements from today's encounter can be found in the full encounter summary report (not reduplicated in this progress note). I personally obtained the chief complaint(s) and history of present illness.  I confirmed and edited as necessary the review of systems, past medical/surgical history, family history, social history, and examination findings as documented by others; and I examined the patient myself. I personally reviewed the relevant tests, images, and reports as documented above.     I formulated and edited as necessary the  assessment and plan and discussed the findings and management plan with the patient and family.      Sindy Mccormack MD

## 2021-07-06 NOTE — TELEPHONE ENCOUNTER
Patient notified of AYANNA Real, CNP's message below.  She requested a BP check appointment for 7/9/2021- appointment scheduled    Routing to Tobias Shea as SRIDEVI-  Patient requested that Tobias Shea be in the loop as well due to the mix up.  (patient thinks that Tobias Shea had advised that labs were needed after 2 weeks but she will wait until her physical in September. Tobias Shea's OV notes from 5/26/2021 actually states that patient is not due for labs until September. It was the BP that was needed after 2 weeks which patient did have done on 6/24/2021. It is possible that patient may have mixed up the instructions on this as well?)    Braden Cruz RN  [facility]

## 2021-07-06 NOTE — TELEPHONE ENCOUNTER
She could just do a nurse visit only to check on her blood pressure that would be OK and then we will check all her labs at time of physical which will be after September 10

## 2021-07-06 NOTE — TELEPHONE ENCOUNTER
Patient called back  She believes that AYANNA Real CNP increased her lovastatin to BID at one point.  Explained to patient that she may be mixing up the lovastatin with the losartan.  Explained that the losartan is the one that was increased from once a day to BID.  Went over the dosing instructions on the losartan and lovastatin with patient.  Patient verbalized understanding and apologized for mixing the meds up.  She stated that she will now start to take the medications correctly (Lovastatin 20 mg daily. Losartan 50 mg BID)    1. Patient wondering if and when she will need a BP check again after taking the meds correctly.  2. Patient is afraid her insurance may not pay of annual labs sooner and would prefer to wait until September to do them when her physical is due unless provider needs any labs sooner due to the med mix up    Ok to leave a detailed message on patient's phone    Braden Cruz RN  [facility]

## 2021-07-07 ENCOUNTER — OFFICE VISIT (OUTPATIENT)
Dept: PODIATRY | Facility: CLINIC | Age: 71
End: 2021-07-07
Payer: COMMERCIAL

## 2021-07-07 VITALS
BODY MASS INDEX: 25.8 KG/M2 | WEIGHT: 112 LBS | HEART RATE: 84 BPM | SYSTOLIC BLOOD PRESSURE: 170 MMHG | DIASTOLIC BLOOD PRESSURE: 82 MMHG

## 2021-07-07 DIAGNOSIS — B35.1 ONYCHOMYCOSIS: Primary | ICD-10-CM

## 2021-07-07 PROCEDURE — 99204 OFFICE O/P NEW MOD 45 MIN: CPT | Performed by: PODIATRIST

## 2021-07-07 RX ORDER — TERBINAFINE HYDROCHLORIDE 250 MG/1
250 TABLET ORAL DAILY
Qty: 30 TABLET | Refills: 2 | Status: SHIPPED | OUTPATIENT
Start: 2021-07-07 | End: 2021-11-03

## 2021-07-07 NOTE — PATIENT INSTRUCTIONS
We wish you continued good healing. If you have any questions or concerns, please do not hesitate to contact us at [phone]    [employer] (secure e-mail communication and access to your chart) to send a message or to make an appointment.    Please remember to call and schedule a follow up appointment if one was recommended at your earliest convenience.     +++OF MARCH 2020+++ LOCATION AND HOURS HAVE CHANGED    PLEASE CALL CLINICS TO VERIFY DAYS AND TIMES  PODIATRY CLINIC HOURS  TELEPHONE NUMBER    Dr. Cedrick DAHLPDANIELLE [facility]        Clinics:  [facility]   Tuesday 1PM-6PM  [place]  Wednesday 745AM-330PM  [place]  Thursday/Friday 745AM-230PM  [place] [facility] APPOINTMENTS  [phone]    [place] APPOINTMENTS  [phone]          If you need a medication refill, please contact us you may need lab work and/or a follow up visit prior to your refill (i.e. Antifungal medications).    If MRI needed please call Imaging at [phone] or [phone]    HOW DO I GET MY KNEE SCOOTER? Knee scooters can be picked up at ANY Medical Supply stores with your knee scooter Prescription.  OR    Bring your signed prescription to an [facility] showroom.

## 2021-07-07 NOTE — LETTER
7/7/2021         RE: Bree Carrillo  [address]  [facility] [address]        Dear Colleague,    Thank you for referring your patient, Bree Carrillo, to the [facility]. Please see a copy of my visit note below.    Patient complains of thick left  toenails(s) .  Has had this for several years.  It is slowly getting worse.  Has had pain in the past which is aggravated by activity and relieved by rest.  Tried over the counter medications without success.  Does not like the look of the nail and is wondering about options.  Patient denies heavy OH use.  Denies trauma.    ROS:  A 10-point review of systems was performed and is positive for that noted in the HPI and as seen below.  All other areas are negative.          Allergies   Allergen Reactions     Iohexol Hives     Pt experienced sneeze, itching followed by hives post 100cc Omni 350 for CT Scan.   Observed with IV in place x 45 minutes.   Given 25mg PO Benadryl with OK for 50mg to be taken every 8 hours for the next 24 hours per Dr Rausch.     JRS       Current Outpatient Medications   Medication Sig Dispense Refill     terbinafine (LAMISIL) 250 MG tablet Take 1 tablet (250 mg) by mouth daily 30 tablet 2     Calcium Citrate-Vitamin D (CITRACAL + D) 250-200 MG-UNIT TABS Citracal Calcium 400mg 2 tabs(800mg) BID (Patient taking differently: Citracal Calcium 400mg 1  tabs(800mg) BID)       denosumab (PROLIA) 60 MG/ML SOLN injection Inject 60 mg Subcutaneous       levothyroxine (SYNTHROID/LEVOTHROID) 50 MCG tablet Take 1 tablet (50 mcg) by mouth daily 90 tablet 3     losartan (COZAAR) 50 MG tablet Take 1 tablet (50 mg) by mouth 2 times daily New dose 180 tablet 1     lovastatin (MEVACOR) 20 MG tablet Take 1 tablet (20 mg) by mouth At Bedtime 90 tablet 3     mometasone (ELOCON) 0.1 % solution (lotion)        Multiple Vitamins-Minerals (ALIVE WOMENS GUMMY PO)          Patient Active Problem List   Diagnosis     Anemia     Closed  right cuboid fracture     Falls     GERD (gastroesophageal reflux disease)     GI bleed     Hyperlipidemia with target LDL less than 130     Hypertension with goal blood pressure less than 140/90     Iron deficiency anemia     Osteoporosis     Right foot pain     Vitamin D deficiency     Senile osteoporosis     Adverse effect of other drugs, medicaments and biological substances, sequela       Past Medical History:   Diagnosis Date     Hypertension        Past Surgical History:   Procedure Laterality Date     LASIK BILATERAL Bilateral 2012    Dr Chavez       Family History   Problem Relation Age of Onset     No Known Problems Mother      No Known Problems Father      No Known Problems Maternal Grandmother      No Known Problems Maternal Grandfather      No Known Problems Paternal Grandmother      No Known Problems Paternal Grandfather      Coronary Artery Disease Brother      No Known Problems Sister      No Known Problems Son      No Known Problems Daughter      No Known Problems Maternal Half-Brother      No Known Problems Maternal Half-Sister      No Known Problems Paternal Half-Brother      No Known Problems Paternal Half-Sister      No Known Problems Niece      No Known Problems Nephew      No Known Problems Cousin      No Known Problems Other      Diabetes No family hx of      Hypertension No family hx of      Hyperlipidemia No family hx of      Cerebrovascular Disease No family hx of      Breast Cancer No family hx of      Colon Cancer No family hx of      Prostate Cancer No family hx of      Other Cancer No family hx of      Depression No family hx of      Anxiety Disorder No family hx of      Mental Illness No family hx of      Substance Abuse No family hx of      Anesthesia Reaction No family hx of      Asthma No family hx of      Osteoporosis No family hx of      Genetic Disorder No family hx of      Thyroid Disease No family hx of      Obesity No family hx of      Unknown/Adopted No family hx of       Glaucoma No family hx of      Macular Degeneration No family hx of        Social History     Tobacco Use     Smoking status: Never Smoker     Smokeless tobacco: Never Used   Substance Use Topics     Alcohol use: No         BP (!) 170/82   Pulse 84   Wt 50.8 kg (112 lb)   BMI 25.80 kg/m  .      VConstitutional/ general:  Pt is in no apparent distress, appears well-nourished.  Cooperative with history and physical exam.     Psych:  The patient answered questions appropriately.  Normal affect.  Seems to have reasonable expectations, in terms of treatment.    Eyes:  Visual scanning/ tracking without deficit.    Ears:  Response to auditory stimuli is normal.  negative hearing aid devices.  Auricles in proper alignment.     Lymphatic:  Popliteal lymph nodes not enlarged.     Lungs:  Non labored breathing, non labored speech. No cough.  No audible wheezing. Even, quiet breathing.       Vascular:  Pedal pulses are palpable bilaterally for both the DP and PT arteries.  CFT < 3 sec.  No edema.  Pedal hair growth noted.     Neuro:  Alert and oriented x 3. Coordinated gait.  Light touch sensation is intact to the L4, L5, S1 distributions. No obvious deficits.  No evidence of neurological-based weakness, spasticity, or contracture in the lower extremities.    Derm: Normal texture and turgor.  No erythema, ecchymosis, or cyanosis.  No open lesions. right lessor toenails(s)  thickened, elongated, discolored, with subungual debris.  No erythema, edema, drainage, pain on palpation.  No signs of subungual masses or exostosis.    Musculoskeletal:    Lower extremity muscle strength is normal.  Patient is ambulatory without an assistive device or brace.  No gross deformities.  MS 5/5 all compartments.  Normal arch with weightbearing.         A/P  Onychomycosis    Discussed all options with patient.  Would like to try lamisil.  Risks, complications, and efficacy of going on this pill were discussed with the patient.  Will start  lamisil 250mg, dispense 30, one per oral every day.  Two refils.  2020 LFTs normal.  RTC in 3 months.    Cedrick Hawley DPM, FACFAS                      Again, thank you for allowing me to participate in the care of your patient.        Sincerely,        Cedrick Hawley DPM

## 2021-07-07 NOTE — PROGRESS NOTES
Patient complains of thick left  toenails(s) .  Has had this for several years.  It is slowly getting worse.  Has had pain in the past which is aggravated by activity and relieved by rest.  Tried over the counter medications without success.  Does not like the look of the nail and is wondering about options.  Patient denies heavy OH use.  Denies trauma.    ROS:  A 10-point review of systems was performed and is positive for that noted in the HPI and as seen below.  All other areas are negative.          Allergies   Allergen Reactions     Iohexol Hives     Pt experienced sneeze, itching followed by hives post 100cc Omni 350 for CT Scan.   Observed with IV in place x 45 minutes.   Given 25mg PO Benadryl with OK for 50mg to be taken every 8 hours for the next 24 hours per Dr Rausch.     ABDOULAYE       Current Outpatient Medications   Medication Sig Dispense Refill     terbinafine (LAMISIL) 250 MG tablet Take 1 tablet (250 mg) by mouth daily 30 tablet 2     Calcium Citrate-Vitamin D (CITRACAL + D) 250-200 MG-UNIT TABS Citracal Calcium 400mg 2 tabs(800mg) BID (Patient taking differently: Citracal Calcium 400mg 1  tabs(800mg) BID)       denosumab (PROLIA) 60 MG/ML SOLN injection Inject 60 mg Subcutaneous       levothyroxine (SYNTHROID/LEVOTHROID) 50 MCG tablet Take 1 tablet (50 mcg) by mouth daily 90 tablet 3     losartan (COZAAR) 50 MG tablet Take 1 tablet (50 mg) by mouth 2 times daily New dose 180 tablet 1     lovastatin (MEVACOR) 20 MG tablet Take 1 tablet (20 mg) by mouth At Bedtime 90 tablet 3     mometasone (ELOCON) 0.1 % solution (lotion)        Multiple Vitamins-Minerals (ALIVE WOMENS GUMMY PO)          Patient Active Problem List   Diagnosis     Anemia     Closed right cuboid fracture     Falls     GERD (gastroesophageal reflux disease)     GI bleed     Hyperlipidemia with target LDL less than 130     Hypertension with goal blood pressure less than 140/90     Iron deficiency anemia     Osteoporosis     Right foot  pain     Vitamin D deficiency     Senile osteoporosis     Adverse effect of other drugs, medicaments and biological substances, sequela       Past Medical History:   Diagnosis Date     Hypertension        Past Surgical History:   Procedure Laterality Date     LASIK BILATERAL Bilateral 2012    Dr Chavez       Family History   Problem Relation Age of Onset     No Known Problems Mother      No Known Problems Father      No Known Problems Maternal Grandmother      No Known Problems Maternal Grandfather      No Known Problems Paternal Grandmother      No Known Problems Paternal Grandfather      Coronary Artery Disease Brother      No Known Problems Sister      No Known Problems Son      No Known Problems Daughter      No Known Problems Maternal Half-Brother      No Known Problems Maternal Half-Sister      No Known Problems Paternal Half-Brother      No Known Problems Paternal Half-Sister      No Known Problems Niece      No Known Problems Nephew      No Known Problems Cousin      No Known Problems Other      Diabetes No family hx of      Hypertension No family hx of      Hyperlipidemia No family hx of      Cerebrovascular Disease No family hx of      Breast Cancer No family hx of      Colon Cancer No family hx of      Prostate Cancer No family hx of      Other Cancer No family hx of      Depression No family hx of      Anxiety Disorder No family hx of      Mental Illness No family hx of      Substance Abuse No family hx of      Anesthesia Reaction No family hx of      Asthma No family hx of      Osteoporosis No family hx of      Genetic Disorder No family hx of      Thyroid Disease No family hx of      Obesity No family hx of      Unknown/Adopted No family hx of      Glaucoma No family hx of      Macular Degeneration No family hx of        Social History     Tobacco Use     Smoking status: Never Smoker     Smokeless tobacco: Never Used   Substance Use Topics     Alcohol use: No         BP (!) 170/82   Pulse 84   Wt 50.8  kg (112 lb)   BMI 25.80 kg/m  .      VConstitutional/ general:  Pt is in no apparent distress, appears well-nourished.  Cooperative with history and physical exam.     Psych:  The patient answered questions appropriately.  Normal affect.  Seems to have reasonable expectations, in terms of treatment.    Eyes:  Visual scanning/ tracking without deficit.    Ears:  Response to auditory stimuli is normal.  negative hearing aid devices.  Auricles in proper alignment.     Lymphatic:  Popliteal lymph nodes not enlarged.     Lungs:  Non labored breathing, non labored speech. No cough.  No audible wheezing. Even, quiet breathing.       Vascular:  Pedal pulses are palpable bilaterally for both the DP and PT arteries.  CFT < 3 sec.  No edema.  Pedal hair growth noted.     Neuro:  Alert and oriented x 3. Coordinated gait.  Light touch sensation is intact to the L4, L5, S1 distributions. No obvious deficits.  No evidence of neurological-based weakness, spasticity, or contracture in the lower extremities.    Derm: Normal texture and turgor.  No erythema, ecchymosis, or cyanosis.  No open lesions. right lessor toenails(s)  thickened, elongated, discolored, with subungual debris.  No erythema, edema, drainage, pain on palpation.  No signs of subungual masses or exostosis.    Musculoskeletal:    Lower extremity muscle strength is normal.  Patient is ambulatory without an assistive device or brace.  No gross deformities.  MS 5/5 all compartments.  Normal arch with weightbearing.         A/P  Onychomycosis    Discussed all options with patient.  Would like to try lamisil.  Risks, complications, and efficacy of going on this pill were discussed with the patient.  Will start lamisil 250mg, dispense 30, one per oral every day.  Two refils.  2020 LFTs normal.  RTC in 3 months.    Cedrick Hawley, OMAIRA, FACFAS

## 2021-07-09 ENCOUNTER — ALLIED HEALTH/NURSE VISIT (OUTPATIENT)
Dept: NURSING | Facility: CLINIC | Age: 71
End: 2021-07-09
Payer: COMMERCIAL

## 2021-07-09 VITALS — SYSTOLIC BLOOD PRESSURE: 136 MMHG | DIASTOLIC BLOOD PRESSURE: 70 MMHG

## 2021-07-09 DIAGNOSIS — Z01.30 BP CHECK: Primary | ICD-10-CM

## 2021-07-09 PROCEDURE — 99207 PR NO CHARGE NURSE ONLY: CPT

## 2021-07-09 NOTE — PROGRESS NOTES
BP Readings from Last 6 Encounters:   07/09/21 136/70   07/07/21 (!) 170/82   06/24/21 (!) 142/78   05/26/21 (!) 180/84   02/18/21 (!) 159/82   02/12/21 (!) 166/80     Continue present dose of 50 mg twice a day or can actually take both at the same time   Follow up in person appointment in about a month on present dose

## 2021-07-09 NOTE — NURSING NOTE
Pt is here today for BP check. Initial check was 154/74. Having pt wait 10 minutes before rechecking. 136/70 was next check.

## 2021-07-09 NOTE — Clinical Note
Pt was here today for BP check, see flowhseets. She is wondering if she still needs to come in for nurse only BP check every 2 weeks. Please advise. Thanks! Send [employer] message to let pt know.

## 2021-08-12 ENCOUNTER — TELEPHONE (OUTPATIENT)
Dept: ENDOCRINOLOGY | Facility: CLINIC | Age: 71
End: 2021-08-12

## 2021-08-12 NOTE — TELEPHONE ENCOUNTER
[facility] Call Center    Phone Message    May a detailed message be left on voicemail: yes     Reason for Call: Patient calling regarding getting a refill on medication levothyroxine (SYNTHROID/LEVOTHROID) 50 MCG tablet. Please advise. Thank you    Action Taken: Message routed to:  Adult Clinics: Endocrinology p [phone]    Travel Screening: Not Applicable

## 2021-08-12 NOTE — TELEPHONE ENCOUNTER
Prescription on medication list patient has refills until February 2022.    Writer confirmed with pharmacy they have on file and they will refill medication.      Writer contacted patient and let he know the above information. Patient verbalizes understanding.    Patient also wanted to let Dr. Lundberg know that she is still losing hair.     Patient is having labs on Monday pre her Prolia injection and writer will review with Dr. Lundberg and ask if TSH/FT4 should be added to Therapy plan.      Yoselin Meza RN  Endocrine Care Coordinator  [facility]

## 2021-08-16 ENCOUNTER — INFUSION THERAPY VISIT (OUTPATIENT)
Dept: INFUSION THERAPY | Facility: CLINIC | Age: 71
End: 2021-08-16
Payer: COMMERCIAL

## 2021-08-16 ENCOUNTER — LAB (OUTPATIENT)
Dept: LAB | Facility: CLINIC | Age: 71
End: 2021-08-16
Payer: COMMERCIAL

## 2021-08-16 VITALS — DIASTOLIC BLOOD PRESSURE: 73 MMHG | SYSTOLIC BLOOD PRESSURE: 142 MMHG | HEART RATE: 71 BPM | OXYGEN SATURATION: 98 %

## 2021-08-16 DIAGNOSIS — T50.995S ADVERSE EFFECT OF OTHER DRUGS, MEDICAMENTS AND BIOLOGICAL SUBSTANCES, SEQUELA: ICD-10-CM

## 2021-08-16 DIAGNOSIS — M81.0 SENILE OSTEOPOROSIS: ICD-10-CM

## 2021-08-16 DIAGNOSIS — T50.995S ADVERSE EFFECT OF OTHER DRUGS, MEDICAMENTS AND BIOLOGICAL SUBSTANCES, SEQUELA: Primary | ICD-10-CM

## 2021-08-16 DIAGNOSIS — M81.0 SENILE OSTEOPOROSIS: Primary | ICD-10-CM

## 2021-08-16 LAB
CALCIUM SERPL-MCNC: 8.9 MG/DL (ref 8.5–10.1)
CREAT SERPL-MCNC: 0.78 MG/DL (ref 0.52–1.04)
GFR SERPL CREATININE-BSD FRML MDRD: 76 ML/MIN/1.73M2
PHOSPHATE SERPL-MCNC: 3.6 MG/DL (ref 2.5–4.5)

## 2021-08-16 PROCEDURE — 82565 ASSAY OF CREATININE: CPT

## 2021-08-16 PROCEDURE — 84100 ASSAY OF PHOSPHORUS: CPT | Performed by: INTERNAL MEDICINE

## 2021-08-16 PROCEDURE — 82310 ASSAY OF CALCIUM: CPT | Performed by: INTERNAL MEDICINE

## 2021-08-16 PROCEDURE — 96372 THER/PROPH/DIAG INJ SC/IM: CPT | Performed by: NURSE PRACTITIONER

## 2021-08-16 PROCEDURE — 99207 PR NO CHARGE LOS: CPT

## 2021-08-16 PROCEDURE — 36415 COLL VENOUS BLD VENIPUNCTURE: CPT | Performed by: INTERNAL MEDICINE

## 2021-08-16 NOTE — PROGRESS NOTES
Infusion Nursing Note:  Breefelipe Carrillo presents today for   Chief Complaint   Patient presents with     Allied Health Visit     Prolia     Patient seen by provider today: No   present during visit today: Not Applicable.    Note: Patient was assessed by Deysi James RN prior to injection.      Intravenous Access:  No Intravenous access/labs at this visit.    Treatment Conditions:  Lab Results   Component Value Date     09/10/2020                   Lab Results   Component Value Date    POTASSIUM 4.0 09/10/2020           No results found for: MAG         Lab Results   Component Value Date    CR 0.78 08/16/2021    CR 0.77 02/18/2021                   Lab Results   Component Value Date    DEVAN 8.9 08/16/2021    DEVAN 9.1 02/18/2021                Lab Results   Component Value Date    BILITOTAL 0.4 09/10/2020           Lab Results   Component Value Date    ALBUMIN 3.6 09/10/2020                    Lab Results   Component Value Date    ALT 28 09/10/2020           Lab Results   Component Value Date    AST 20 09/10/2020       Results reviewed, labs MET treatment parameters, ok to proceed with treatment.      Post Infusion Assessment:  Patient tolerated injection without incident.  Site patent and intact, free from redness, edema or discomfort.       Discharge Plan:   Patient discharged in stable condition accompanied by: self.  Departure Mode: Ambulatory.      Bhavna Baum CMA

## 2021-08-26 NOTE — TELEPHONE ENCOUNTER
Per Dr. Lundberg, patient can move up her appointment to next few months if she would prefer.    Writer contacted patient to review. Scheduled patient for in person visit on 9/10/21.      Yoselin Meza RN  Endocrine Care Coordinator  [facility]

## 2021-08-30 ENCOUNTER — TELEPHONE (OUTPATIENT)
Dept: ENDOCRINOLOGY | Facility: CLINIC | Age: 71
End: 2021-08-30

## 2021-08-30 ENCOUNTER — TELEPHONE (OUTPATIENT)
Dept: FAMILY MEDICINE | Facility: CLINIC | Age: 71
End: 2021-08-30

## 2021-08-30 NOTE — TELEPHONE ENCOUNTER
Per Dr Lundberg's last office note, DEXA scan prior to next appointment. Patient contacted and informed and transferred to imaging scheduling.    Luba Montes [facility]  Adult Endocrinology  [facility]

## 2021-08-30 NOTE — TELEPHONE ENCOUNTER
The patient called wondering if she needs to have an EKG done as well before next appointment? Writer relayed DEXA needs to be done. But she wants to know if she also needs an EKG before scheduling DEXA? Please advise. Thank you.

## 2021-08-30 NOTE — TELEPHONE ENCOUNTER
[facility] Call Center    Phone Message    May a detailed message be left on voicemail: yes     Reason for Call: Other: patient wanting to know if she still needs DEXA scan prior to her appt in Feb. Please call back to confirm.    Pt informed there is an order in system currently but wanting Dr. Lundberg's advisement.     Action Taken: Message routed to:  Adult Clinics: Endocrinology p [phone]    Travel Screening: Not Applicable

## 2021-08-30 NOTE — TELEPHONE ENCOUNTER
Patient is calling. Patient was hard to understand. She as confirming her appointment with Dr Carlos. I informed her her upcoming appointment is with Reina Corley. She said it is supposed to be with Dr Carlos. She said something about a nurse calling her to let her know if it was OK for her to see Dr Carlos. I do not see any notes in regard to this. Can someone look into this please at [place].[facility]

## 2021-09-01 NOTE — TELEPHONE ENCOUNTER
Patient would like to switch from AYANNA Real, KYRIE to Tobias Shea for PCP.  Rescheduled physical to 10/27/2021 with Tobias Shea per patient's request    Braden Cruz RN  [facility]

## 2021-09-13 NOTE — TELEPHONE ENCOUNTER
"Writer called and left a message to call clinic back to ask about the need for EKG per message for Dr. Lundberg.    \"Could you please communicate with patient and family, why the patient needs EKG? Did any doctor recommended? I want to help but I need to know.     Deanna\"     Joy Dee, [facility]  Adult Endocrinology  [facility]    "

## 2021-09-20 NOTE — TELEPHONE ENCOUNTER
Patient contacted and plans to further discuss during upcoming scheduled appointment with PCP. Patient was requesting EKG because her daughter recommended due to 1-2 syncope episodes in the last 1-2 years.    Luba Montes CMA  Adult Endocrinology  [facility]

## 2021-09-26 ENCOUNTER — HEALTH MAINTENANCE LETTER (OUTPATIENT)
Age: 71
End: 2021-09-26

## 2021-10-06 ENCOUNTER — OFFICE VISIT (OUTPATIENT)
Dept: PODIATRY | Facility: CLINIC | Age: 71
End: 2021-10-06
Payer: COMMERCIAL

## 2021-10-06 VITALS — WEIGHT: 112 LBS | BODY MASS INDEX: 25.8 KG/M2

## 2021-10-06 DIAGNOSIS — B35.1 ONYCHOMYCOSIS: Primary | ICD-10-CM

## 2021-10-06 LAB
ALT SERPL W P-5'-P-CCNC: 34 U/L (ref 0–50)
AST SERPL W P-5'-P-CCNC: 23 U/L (ref 0–45)

## 2021-10-06 PROCEDURE — 84450 TRANSFERASE (AST) (SGOT): CPT | Performed by: PODIATRIST

## 2021-10-06 PROCEDURE — 36415 COLL VENOUS BLD VENIPUNCTURE: CPT | Performed by: PODIATRIST

## 2021-10-06 PROCEDURE — 99213 OFFICE O/P EST LOW 20 MIN: CPT | Performed by: PODIATRIST

## 2021-10-06 PROCEDURE — 84460 ALANINE AMINO (ALT) (SGPT): CPT | Performed by: PODIATRIST

## 2021-10-06 RX ORDER — TERBINAFINE HYDROCHLORIDE 250 MG/1
250 TABLET ORAL DAILY
Qty: 30 TABLET | Refills: 2 | Status: SHIPPED | OUTPATIENT
Start: 2021-10-06 | End: 2021-10-27

## 2021-10-06 NOTE — PROGRESS NOTES
Subjective:    Pt is seen today as a f/u pt for Lamisil treatment.  Has been on meds for 3 months.  Denies any s/e w/ meds and has been tolerating them well.      ROS:  Denies headaches or gi distress.       Allergies   Allergen Reactions     Iohexol Hives     Pt experienced sneeze, itching followed by hives post 100cc Omni 350 for CT Scan.   Observed with IV in place x 45 minutes.   Given 25mg PO Benadryl with OK for 50mg to be taken every 8 hours for the next 24 hours per Dr Rausch.     JRS       Current Outpatient Medications   Medication Sig Dispense Refill     Calcium Citrate-Vitamin D (CITRACAL + D) 250-200 MG-UNIT TABS Citracal Calcium 400mg 2 tabs(800mg) BID (Patient taking differently: Citracal Calcium 400mg 1  tabs(800mg) BID)       denosumab (PROLIA) 60 MG/ML SOLN injection Inject 60 mg Subcutaneous       levothyroxine (SYNTHROID/LEVOTHROID) 50 MCG tablet Take 1 tablet (50 mcg) by mouth daily 90 tablet 3     losartan (COZAAR) 50 MG tablet Take 1 tablet (50 mg) by mouth 2 times daily New dose 180 tablet 1     lovastatin (MEVACOR) 20 MG tablet Take 1 tablet (20 mg) by mouth At Bedtime 90 tablet 3     mometasone (ELOCON) 0.1 % solution (lotion)        Multiple Vitamins-Minerals (ALIVE WOMENS GUMMY PO)        terbinafine (LAMISIL) 250 MG tablet Take 1 tablet (250 mg) by mouth daily 30 tablet 2       Patient Active Problem List   Diagnosis     Anemia     Closed right cuboid fracture     Falls     GERD (gastroesophageal reflux disease)     GI bleed     Hyperlipidemia with target LDL less than 130     Hypertension with goal blood pressure less than 140/90     Iron deficiency anemia     Osteoporosis     Right foot pain     Vitamin D deficiency     Senile osteoporosis     Adverse effect of other drugs, medicaments and biological substances, sequela       Past Medical History:   Diagnosis Date     Hypertension        Past Surgical History:   Procedure Laterality Date     LASIK BILATERAL Bilateral 2012    Dr Chavez        Family History   Problem Relation Age of Onset     No Known Problems Mother      No Known Problems Father      No Known Problems Maternal Grandmother      No Known Problems Maternal Grandfather      No Known Problems Paternal Grandmother      No Known Problems Paternal Grandfather      Coronary Artery Disease Brother      No Known Problems Sister      No Known Problems Son      No Known Problems Daughter      No Known Problems Maternal Half-Brother      No Known Problems Maternal Half-Sister      No Known Problems Paternal Half-Brother      No Known Problems Paternal Half-Sister      No Known Problems Niece      No Known Problems Nephew      No Known Problems Cousin      No Known Problems Other      Diabetes No family hx of      Hypertension No family hx of      Hyperlipidemia No family hx of      Cerebrovascular Disease No family hx of      Breast Cancer No family hx of      Colon Cancer No family hx of      Prostate Cancer No family hx of      Other Cancer No family hx of      Depression No family hx of      Anxiety Disorder No family hx of      Mental Illness No family hx of      Substance Abuse No family hx of      Anesthesia Reaction No family hx of      Asthma No family hx of      Osteoporosis No family hx of      Genetic Disorder No family hx of      Thyroid Disease No family hx of      Obesity No family hx of      Unknown/Adopted No family hx of      Glaucoma No family hx of      Macular Degeneration No family hx of        Social History     Tobacco Use     Smoking status: Never Smoker     Smokeless tobacco: Never Used   Substance Use Topics     Alcohol use: No         Objective:    There were no vitals taken for this visit..  Patient pleasant to talk with and in no distress.  Pulses are palpable DP & PT bilateral.  Sensation to light touch is intact.  no forefoot deformities.  Normal ROM all forefoot joints.  Skin intact bilateral and not dry.  Mycotic nails now clearing proximally.  No evidence of  deep abscess, erythema, or infection noted.  No paronychia.  No pain upon palpation to the toe nails.  Nailbed healthy.  No subungual masses noted.  Last LFT's normal.        Assessment:  Onychomycosis     Plan:  Discussed etiology and treatment options with the pt.  Since the medication appears to be effective.  A decision was made to continue Lamisil.  Risks complications and efficacy again discussed.  Continue Lamisil treatment for 3 more months and check LFT's.  rtc 3 months.    Cedrick Hawley, OMAIRA, FACFAS

## 2021-10-06 NOTE — LETTER
10/6/2021         RE: Bree Carrillo  [address]  [facility] [address]        Dear Colleague,    Thank you for referring your patient, Bree Carrillo, to the [facility]. Please see a copy of my visit note below.    Subjective:    Pt is seen today as a f/u pt for Lamisil treatment.  Has been on meds for 3 months.  Denies any s/e w/ meds and has been tolerating them well.      ROS:  Denies headaches or gi distress.       Allergies   Allergen Reactions     Iohexol Hives     Pt experienced sneeze, itching followed by hives post 100cc Omni 350 for CT Scan.   Observed with IV in place x 45 minutes.   Given 25mg PO Benadryl with OK for 50mg to be taken every 8 hours for the next 24 hours per Dr Rausch.     JRS       Current Outpatient Medications   Medication Sig Dispense Refill     Calcium Citrate-Vitamin D (CITRACAL + D) 250-200 MG-UNIT TABS Citracal Calcium 400mg 2 tabs(800mg) BID (Patient taking differently: Citracal Calcium 400mg 1  tabs(800mg) BID)       denosumab (PROLIA) 60 MG/ML SOLN injection Inject 60 mg Subcutaneous       levothyroxine (SYNTHROID/LEVOTHROID) 50 MCG tablet Take 1 tablet (50 mcg) by mouth daily 90 tablet 3     losartan (COZAAR) 50 MG tablet Take 1 tablet (50 mg) by mouth 2 times daily New dose 180 tablet 1     lovastatin (MEVACOR) 20 MG tablet Take 1 tablet (20 mg) by mouth At Bedtime 90 tablet 3     mometasone (ELOCON) 0.1 % solution (lotion)        Multiple Vitamins-Minerals (ALIVE WOMENS GUMMY PO)        terbinafine (LAMISIL) 250 MG tablet Take 1 tablet (250 mg) by mouth daily 30 tablet 2       Patient Active Problem List   Diagnosis     Anemia     Closed right cuboid fracture     Falls     GERD (gastroesophageal reflux disease)     GI bleed     Hyperlipidemia with target LDL less than 130     Hypertension with goal blood pressure less than 140/90     Iron deficiency anemia     Osteoporosis     Right foot pain     Vitamin D deficiency     Senile  osteoporosis     Adverse effect of other drugs, medicaments and biological substances, sequela       Past Medical History:   Diagnosis Date     Hypertension        Past Surgical History:   Procedure Laterality Date     LASIK BILATERAL Bilateral 2012    Dr Chavez       Family History   Problem Relation Age of Onset     No Known Problems Mother      No Known Problems Father      No Known Problems Maternal Grandmother      No Known Problems Maternal Grandfather      No Known Problems Paternal Grandmother      No Known Problems Paternal Grandfather      Coronary Artery Disease Brother      No Known Problems Sister      No Known Problems Son      No Known Problems Daughter      No Known Problems Maternal Half-Brother      No Known Problems Maternal Half-Sister      No Known Problems Paternal Half-Brother      No Known Problems Paternal Half-Sister      No Known Problems Niece      No Known Problems Nephew      No Known Problems Cousin      No Known Problems Other      Diabetes No family hx of      Hypertension No family hx of      Hyperlipidemia No family hx of      Cerebrovascular Disease No family hx of      Breast Cancer No family hx of      Colon Cancer No family hx of      Prostate Cancer No family hx of      Other Cancer No family hx of      Depression No family hx of      Anxiety Disorder No family hx of      Mental Illness No family hx of      Substance Abuse No family hx of      Anesthesia Reaction No family hx of      Asthma No family hx of      Osteoporosis No family hx of      Genetic Disorder No family hx of      Thyroid Disease No family hx of      Obesity No family hx of      Unknown/Adopted No family hx of      Glaucoma No family hx of      Macular Degeneration No family hx of        Social History     Tobacco Use     Smoking status: Never Smoker     Smokeless tobacco: Never Used   Substance Use Topics     Alcohol use: No         Objective:    There were no vitals taken for this visit..  Patient pleasant  to talk with and in no distress.  Pulses are palpable DP & PT bilateral.  Sensation to light touch is intact.  no forefoot deformities.  Normal ROM all forefoot joints.  Skin intact bilateral and not dry.  Mycotic nails now clearing proximally.  No evidence of deep abscess, erythema, or infection noted.  No paronychia.  No pain upon palpation to the toe nails.  Nailbed healthy.  No subungual masses noted.  Last LFT's normal.        Assessment:  Onychomycosis     Plan:  Discussed etiology and treatment options with the pt.  Since the medication appears to be effective.  A decision was made to continue Lamisil.  Risks complications and efficacy again discussed.  Continue Lamisil treatment for 3 more months and check LFT's.  rtc 3 months.    Cedrick Hawley DPM, FACFAS             Again, thank you for allowing me to participate in the care of your patient.        Sincerely,        Cedrick Hawley DPM

## 2021-10-06 NOTE — PATIENT INSTRUCTIONS
We wish you continued good healing. If you have any questions or concerns, please do not hesitate to contact us at  [phone]    [facility] (secure e-mail communication and access to your chart) to send a message or to make an appointment.    Please remember to call and schedule a follow up appointment if one was recommended at your earliest convenience.     PODIATRY CLINIC HOURS  TELEPHONE NUMBER    Dr. Cedrick DAHLPDANIELLE [facility]        Clinics:  [facility] CMA   Tuesday 1PM-6PM  [place]  Wednesday 745AM-330PM  [place]  Thursday/Friday 745AM-230PM  [place] [facility] APPOINTMENTS  [phone]    [place] APPOINTMENTS  [phone]          If you need a medication refill, please contact us you may need lab work and/or a follow up visit prior to your refill (i.e. Antifungal medications).    If MRI needed please call Imaging at [phone] or [phone]    HOW DO I GET MY KNEE SCOOTER? Knee scooters can be picked up at ANY Medical Supply stores with your knee scooter Prescription.  OR    Bring your signed prescription to an [facility] showroom.

## 2021-10-10 DIAGNOSIS — Z13.1 SCREENING FOR DIABETES MELLITUS (DM): ICD-10-CM

## 2021-10-10 DIAGNOSIS — Z13.0 SCREENING FOR DEFICIENCY ANEMIA: Primary | ICD-10-CM

## 2021-10-10 DIAGNOSIS — I10 HYPERTENSION WITH GOAL BLOOD PRESSURE LESS THAN 140/90: ICD-10-CM

## 2021-10-10 DIAGNOSIS — E78.5 HYPERLIPIDEMIA WITH TARGET LDL LESS THAN 130: ICD-10-CM

## 2021-10-24 NOTE — PROGRESS NOTES
"SUBJECTIVE:   Bree Carrillo is a 71 year old female who presents for Preventive Visit.    She is here for annual wellness exam and with other concerns as mentioned below    Hair loss-complaining of significant hair loss and hair thinning since she started taking levothyroxine in February 2021  Patient reported not having any symptoms concerning for hypothyroidism prior to start of the medication  She had borderline elevated TSH, was started on levothyroxine 50 MCG by endocrinology.  Denies cold or heat intolerance, weight loss or gain, change in bowel movements,  chest pain, palpitations, SOB, edema legs or eyes, dry skin, memory problems.  Patient reported starting with biotin supplements that did not help with symptom relief.  Past medical history significant for hypertension, hyperlipidemia and osteoporosis currently following up with Dr. Lizarraga in endocrinology for this  Patient eats a vegetarian diet, she is currently taking calcium and vitamin D supplements for osteoporosis  Denies underlying history of anemia, abnormal bleeding    Patient has been advised of split billing requirements and indicates understanding: Yes   Are you in the first 12 months of your Medicare coverage?  No    Healthy Habits:     In general, how would you rate your overall health?  Good    Frequency of exercise:  1 day/week    Duration of exercise:  15-30 minutes    Do you usually eat at least 4 servings of fruit and vegetables a day, include whole grains    & fiber and avoid regularly eating high fat or \"junk\" foods?  No    Taking medications regularly:  Yes    Medication side effects:  None    Ability to successfully perform activities of daily living:  No assistance needed    Home Safety:  No safety concerns identified    Hearing Impairment:  No hearing concerns    In the past 6 months, have you been bothered by leaking of urine?  No    In general, how would you rate your overall mental or emotional health?  Good      PHQ-2 Total " Score: 0    Additional concerns today:  Yes    Do you feel safe in your environment? Yes    Have you ever done Advance Care Planning? (For example, a Health Directive, POLST, or a discussion with a medical provider or your loved ones about your wishes): Yes, patient states has an Advance Care Planning document and will bring a copy to the clinic.    Fall risk  Fallen 2 or more times in the past year?: (P) No  Any fall with injury in the past year?: (P) No  click delete button to remove this line now  Cognitive Screening   1) Repeat 3 items (Leader, Season, Table)    2) Clock draw: NORMAL  3) 3 item recall: Recalls 2 objects   Results: NORMAL clock, 1-2 items recalled: COGNITIVE IMPAIRMENT LESS LIKELY    Mini-CogTM Copyright S Linda. Licensed by the author for use in [facility]; reprinted with permission (jamir@[employer]). All rights reserved.      Do you have sleep apnea, excessive snoring or daytime drowsiness?: no    Reviewed and updated as needed this visit by clinical staff  Tobacco  Allergies  Meds   Med Hx  Surg Hx  Fam Hx  Soc Hx        Reviewed and updated as needed this visit by Provider                Social History     Tobacco Use     Smoking status: Never Smoker     Smokeless tobacco: Never Used   Substance Use Topics     Alcohol use: No     If you drink alcohol do you typically have >3 drinks per day or >7 drinks per week? No    Alcohol Use 10/27/2021   Prescreen: >3 drinks/day or >7 drinks/week? No   Prescreen: >3 drinks/day or >7 drinks/week? -   No flowsheet data found.        Hyperlipidemia Follow-Up      Are you regularly taking any medication or supplement to lower your cholesterol?   Yes- Lovastatin    Are you having muscle aches or other side effects that you think could be caused by your cholesterol lowering medication?  No    Hypertension Follow-up      Do you check your blood pressure regularly outside of the clinic? Yes     Are you following a low salt diet? Yes    Are your  blood pressures ever more than 140 on the top number (systolic) OR more   than 90 on the bottom number (diastolic), for example 140/90?  Sometimes      Current providers sharing in care for this patient include:   Patient Care Team:  Tobias Carlos MD as PCP - General (Family Medicine)  Deanna Lundberg MD as Assigned Endocrinology Provider  Cedrick Hawley DPM as Assigned Musculoskeletal Provider  Sindy Mccormack MD as Assigned Surgical Provider  Tobias Carlos MD as Assigned PCP    The following health maintenance items are reviewed in Epic and correct as of today:  Health Maintenance Due   Topic Date Due     MICROALBUMIN  09/10/2021     MAMMO SCREENING  11/10/2021     Lab work is in process  Labs reviewed in EPIC  BP Readings from Last 3 Encounters:   10/27/21 (!) 150/81   08/16/21 (!) 142/73   07/09/21 136/70    Wt Readings from Last 3 Encounters:   10/27/21 51.4 kg (113 lb 4.8 oz)   10/06/21 50.8 kg (112 lb)   07/07/21 50.8 kg (112 lb)                  Patient Active Problem List   Diagnosis     Anemia     Closed right cuboid fracture     Falls     GERD (gastroesophageal reflux disease)     GI bleed     Hyperlipidemia with target LDL less than 130     Benign essential hypertension     Iron deficiency anemia     Osteoporosis     Right foot pain     Vitamin D deficiency     Senile osteoporosis     Adverse effect of other drugs, medicaments and biological substances, sequela     Onychomycosis     Past Surgical History:   Procedure Laterality Date     LASIK BILATERAL Bilateral 2012    Dr Chavez       Social History     Tobacco Use     Smoking status: Never Smoker     Smokeless tobacco: Never Used   Substance Use Topics     Alcohol use: No     Family History   Problem Relation Age of Onset     No Known Problems Mother      No Known Problems Father      No Known Problems Maternal Grandmother      No Known Problems Maternal Grandfather      No Known Problems Paternal Grandmother      No Known Problems  Paternal Grandfather      Coronary Artery Disease Brother      No Known Problems Sister      No Known Problems Son      No Known Problems Daughter      No Known Problems Maternal Half-Brother      No Known Problems Maternal Half-Sister      No Known Problems Paternal Half-Brother      No Known Problems Paternal Half-Sister      No Known Problems Niece      No Known Problems Nephew      No Known Problems Cousin      No Known Problems Other      Diabetes No family hx of      Hypertension No family hx of      Hyperlipidemia No family hx of      Cerebrovascular Disease No family hx of      Breast Cancer No family hx of      Colon Cancer No family hx of      Prostate Cancer No family hx of      Other Cancer No family hx of      Depression No family hx of      Anxiety Disorder No family hx of      Mental Illness No family hx of      Substance Abuse No family hx of      Anesthesia Reaction No family hx of      Asthma No family hx of      Osteoporosis No family hx of      Genetic Disorder No family hx of      Thyroid Disease No family hx of      Obesity No family hx of      Unknown/Adopted No family hx of      Glaucoma No family hx of      Macular Degeneration No family hx of          Current Outpatient Medications   Medication Sig Dispense Refill     aspirin (ASA) 81 MG EC tablet Take 1 tablet (81 mg) by mouth daily 90 tablet 3     atorvastatin (LIPITOR) 20 MG tablet Take 1 tablet (20 mg) by mouth every evening New RX replacing mevacor 90 tablet 3     Calcium Citrate-Vitamin D (CITRACAL + D) 250-200 MG-UNIT TABS Citracal Calcium 400mg 2 tabs(800mg) BID (Patient taking differently: Citracal Calcium 400mg 1  tabs(800mg) BID)       levothyroxine (SYNTHROID/LEVOTHROID) 50 MCG tablet Take 1 tablet (50 mcg) by mouth daily 90 tablet 3     losartan (COZAAR) 50 MG tablet TAKE 1 TABLET BY MOUTH EVERY DAY (Patient taking differently: 50 mg 2 times daily ) 90 tablet 3     losartan-hydrochlorothiazide (HYZAAR) 100-25 MG tablet Take 1  tablet by mouth every morning This is replacing the losartan 90 tablet 0     lovastatin (MEVACOR) 20 MG tablet Take 1 tablet (20 mg) by mouth At Bedtime 90 tablet 3     mometasone (ELOCON) 0.1 % solution (lotion)        Multiple Vitamins-Minerals (ALIVE WOMENS GUMMY PO)        terbinafine (LAMISIL) 250 MG tablet Take 1 tablet (250 mg) by mouth daily 30 tablet 2     Allergies   Allergen Reactions     Iohexol Hives     Pt experienced sneeze, itching followed by hives post 100cc Omni 350 for CT Scan.   Observed with IV in place x 45 minutes.   Given 25mg PO Benadryl with OK for 50mg to be taken every 8 hours for the next 24 hours per Dr Rausch.     JRS     Recent Labs   Lab Test 10/27/21  0950 10/06/21  0825 08/16/21  0736 05/24/21  0725 02/18/21  1101 02/12/21  1614 10/14/20  0731 09/10/20  0920 02/21/20  0728 02/21/20  0728   *  --   --   --   --   --   --  103*  --  111*   HDL 89  --   --   --   --   --   --  73  --  80   TRIG 80  --   --   --   --   --   --  102  --  113   ALT 36 34  --   --   --   --   --  28   < > 19   CR 0.72  --  0.78  --  0.77  --   --  0.74   < > 0.81   GFRESTIMATED 84  --  76  --  78  --   --  81   < > 73   GFRESTBLACK  --   --   --   --  90  --   --  >90   < > 85   POTASSIUM 3.8  --   --   --   --   --   --  4.0   < > 4.2   TSH 1.69  --   --  3.31  --    < >   < > 4.17*   < > 4.42*    < > = values in this interval not displayed.      Pneumonia Vaccine: Up-to-date  Mammogram Screening: Mammogram Screening: Recommended mammography every 1-2 years with patient discussion and risk factor consideration  History of abnormal Pap smear: NO - age 65 - see link Cervical Cytology Screening Guidelines  Last 3 Pap and HPV Results:          Review of Systems   Constitutional: Negative for chills.   Gastrointestinal: Negative for abdominal pain.   Breasts:  Negative for tenderness, breast mass and discharge.   Genitourinary: Negative for hematuria, pelvic pain, vaginal bleeding and vaginal  "discharge.     CONSTITUTIONAL: NEGATIVE for fever, chills, change in weight  INTEGUMENTARY/SKIN: Hair loss  EYES: NEGATIVE for vision changes or irritation  ENT/MOUTH: NEGATIVE for ear, mouth and throat problems  RESP: NEGATIVE for significant cough or SOB  BREAST: NEGATIVE for masses, tenderness or discharge  CV: NEGATIVE for chest pain, palpitations or peripheral edema  CV: History of hypertension  GI: NEGATIVE for nausea, abdominal pain, heartburn, or change in bowel habits  : Postmenopausal  MUSCULOSKELETAL: History of osteoporosis  NEURO: NEGATIVE for weakness, dizziness or paresthesias  ENDOCRINE: Elevated TSH  HEME/ALLERGY/IMMUNE: NEGATIVE for bleeding problems  PSYCHIATRIC: NEGATIVE for changes in mood or affect    OBJECTIVE:   BP (!) 150/81   Pulse 68   Temp 97.6  F (36.4  C) (Oral)   Resp 16   Ht 1.397 m (4' 7\")   Wt 51.4 kg (113 lb 4.8 oz)   SpO2 95%   BMI 26.33 kg/m   Estimated body mass index is 26.33 kg/m  as calculated from the following:    Height as of this encounter: 1.397 m (4' 7\").    Weight as of this encounter: 51.4 kg (113 lb 4.8 oz).  Physical Exam  GENERAL APPEARANCE: healthy, alert and no distress  EYES: Eyes grossly normal to inspection, PERRL and conjunctivae and sclerae normal  HENT: ear canals and TM's normal, nose and mouth without ulcers or lesions, oropharynx clear and oral mucous membranes moist  NECK: no adenopathy, no asymmetry, masses, or scars and thyroid normal to palpation  RESP: lungs clear to auscultation - no rales, rhonchi or wheezes  BREAST: normal without masses, tenderness or nipple discharge and no palpable axillary masses or adenopathy  CV: regular rate and rhythm, normal S1 S2, no S3 or S4, no murmur, click or rub, no peripheral edema and peripheral pulses strong  ABDOMEN: soft, nontender, no hepatosplenomegaly, no masses and bowel sounds normal  MS: no musculoskeletal defects are noted and gait is age appropriate without ataxia  SKIN: Diffuse thinning of " hair on the scalp, frontal receding of hairline  Diffuse, nontender lipoma of about 4 to 5 cm in the posterior axillary line  NEURO: Normal strength and tone, sensory exam grossly normal, mentation intact and speech normal  PSYCH: mentation appears normal and affect normal/bright    Diagnostic Test Results:  Labs reviewed in Epic    ASSESSMENT / PLAN:   (Z00.00) Encounter for Medicare annual wellness exam  (primary encounter diagnosis)  Comment:   Plan: Discussed on regular exercises, daily calcium intake, healthy eating, self breast exams monthly and routine dental checks    (L65.9) Hair loss  Comment:   Plan: TSH with free T4 reflex, Ferritin, Vitamin B12        We will repeat her thyroid labs today, if normal, will recommend to hold levothyroxine for the next 6 weeks and repeat thyroid labs afterwards to see if she is truly hypothyroid needing replacement, since patient's concern started after starting on levothyroxine  Patient eats a vegetarian diet, will get the ferritin, CBC and vitamin B12 levels today to exclude anemia and low vitamin B-12 or iron levels causing the hair loss    (E03.8,  E06.3) Hypothyroidism due to Hashimoto's thyroiditis  Comment:   Plan: TSH with free T4 reflex        Reviewed serial thyroid labs showing low normal free T4 and very slightly elevated TSH and slightly elevated thyroid cultures antibodies of 47  Patient is currently taking levothyroxine 50 MCG daily  She reported not having any hypothyroid symptoms before the start of the medication  With the concern for hair loss that started after the replacement therapy, based on the thyroid labs level today, will consider holding the levothyroxine for a few weeks and repeating the labs to see the trend  Patient verbalised understanding and is agreeable to the plan.      (I10) Benign essential hypertension  Comment:   Plan: losartan-hydrochlorothiazide (HYZAAR) 100-25 MG        tablet          BP Readings from Last 6 Encounters:    10/27/21 (!) 150/81   08/16/21 (!) 142/73   07/09/21 136/70   07/07/21 (!) 170/82   06/24/21 (!) 142/78   05/26/21 (!) 180/84     Blood pressure is not at goal   patient is currently taking losartan 50 mg twice a day  Recommended to stop losartan  We will start on Hyzaar  Follow-up in 5 to 6 weeks for blood pressure recheck along with BMP labs  Continue with low-salt diet, regular exercises  Dosing and potential medication side effects discussed.      (E78.5) Hyperlipidemia with target LDL less than 130  Comment: Patient is currently taking lovastatin 20 mg daily  Plan:   LDL Cholesterol Calculated   Date Value Ref Range Status   10/27/2021 115 (H) <=100 mg/dL Final   09/10/2020 103 (H) <100 mg/dL Final     Comment:     Above desirable:  100-129 mg/dl  Borderline High:  130-159 mg/dL  High:             160-189 mg/dL  Very high:       >189 mg/dl     The 10-year ASCVD risk score (Arpanbrenda VIRAMONTES Jr., et al., 2013) is: 20.1%    Values used to calculate the score:      Age: 71 years      Sex: Female      Is Non- : No      Diabetic: No      Tobacco smoker: No      Systolic Blood Pressure: 150 mmHg      Is BP treated: Yes      HDL Cholesterol: 89 mg/dL      Total Cholesterol: 220 mg/dL    LDL is not at goal  Recommend to stop lovastatin and switch to Lipitor 20 mg daily  We will get a lab recheck at the next visit in 3 months  Continue with healthy eating and regular exercises  Due to the significantly elevated 10-year ASCVD risk of 20.1%, recommend to start on baby aspirin 81 mg once daily    atorvastatin (LIPITOR) 20 MG tablet, aspirin         (ASA) 81 MG EC tablet            (M81.0) Senile osteoporosis  Comment:   Plan: Continue to follow-up with endocrinology, on Caltrate D twice a day and Prolia    (D17.30) Lipoma of skin and subcutaneous tissue  Comment:   Plan: Reassured patient of the benign nature of the lipoma, will monitor for now  Consider general surgery consult for increasing size,  "discomfort, or pressure symptoms    (Z12.31) Encounter for screening mammogram for malignant neoplasm of breast  Comment:   Plan: Patient is scheduled for mammogram next month    (Z12.11) Colon cancer screening  Comment: Reviewed normal colonoscopy from 2014, recheck in 2024  Plan: as above      (Z13.1) Screening for diabetes mellitus (DM)  Comment:   Plan: Will f/u on results and call with recommendations.        (Z13.0) Screening for deficiency anemia  Comment:   Plan: CBC with differential    (B35.1) Onychomycosis  Comment:   Plan: On Lamisil per podiatry    Patient has been advised of split billing requirements and indicates understanding: Yes  COUNSELING:  Reviewed preventive health counseling, as reflected in patient instructions  Special attention given to:       Regular exercise       Healthy diet/nutrition       Vision screening       Hearing screening       Dental care       Bladder control       Fall risk prevention       Immunizations    Vaccinated for: Influenza             Aspirin prophylaxis        Osteoporosis prevention/bone health       Colon cancer screening       (Radha)menopause management       The 10-year ASCVD risk score (Arpan VIRAMONTES Jr., et al., 2013) is: 20.1%    Values used to calculate the score:      Age: 71 years      Sex: Female      Is Non- : No      Diabetic: No      Tobacco smoker: No      Systolic Blood Pressure: 150 mmHg      Is BP treated: Yes      HDL Cholesterol: 89 mg/dL      Total Cholesterol: 220 mg/dL    Estimated body mass index is 26.33 kg/m  as calculated from the following:    Height as of this encounter: 1.397 m (4' 7\").    Weight as of this encounter: 51.4 kg (113 lb 4.8 oz).        She reports that she has never smoked. She has never used smokeless tobacco.      Appropriate preventive services were discussed with this patient, including applicable screening as appropriate for cardiovascular disease, diabetes, osteopenia/osteoporosis, and glaucoma.  " As appropriate for age/gender, discussed screening for colorectal cancer, prostate cancer, breast cancer, and cervical cancer. Checklist reviewing preventive services available has been given to the patient.    Reviewed patients plan of care and provided an AVS. The Intermediate Care Plan ( asthma action plan, low back pain action plan, and migraine action plan) for Bree meets the Care Plan requirement. This Care Plan has been established and reviewed with the Patient  .    Counseling Resources:  ATP IV Guidelines  Pooled Cohorts Equation Calculator  Breast Cancer Risk Calculator  Breast Cancer: Medication to Reduce Risk  FRAX Risk Assessment  ICSI Preventive Guidelines  Dietary Guidelines for Americans, 2010  USDA's MyPlate  ASA Prophylaxis  Lung CA Screening    Tobias Carlos MD  [facility]    Identified Health Risks:

## 2021-10-24 NOTE — PATIENT INSTRUCTIONS
Stop LOSARTAN and switch to HYZAAR once daily  schedule for follow up and fasting labs in 6 weeks    Patient Education   Personalized Prevention Plan  You are due for the preventive services outlined below.  Your care team is available to assist you in scheduling these services.  If you have already completed any of these items, please share that information with your care team to update in your medical record.  Health Maintenance Due   Topic Date Due     ANNUAL REVIEW OF HM ORDERS  Never done     Complete Blood Count  02/21/2021     Flu Vaccine (1) 09/01/2021     Annual Wellness Visit  09/10/2021     Comprehensive Metabolic Panel  09/10/2021     Cholesterol Lab  09/10/2021     Kidney Microalbumin Urine Test  09/10/2021     Mammogram  11/10/2021        Patient Education     Understanding a Lipoma     A lipoma is a lump under the skin that s made of fat. It s not cancer (benign). It feels soft like rubber when you press it, and in most cases it doesn t hurt. Some people have more than one. A lipoma grows slowly over time and doesn t cause many problems. Lipomas occur most often in adults from ages 40 to 60. They are more common in men.   How to say it  [id number]  What causes a lipoma?  Experts don't know yet what causes lipomas. They are still learning more. They may be partly caused by a problem in a gene. They can run in families. Familial multiple lipomatosis is when 2 or more family members have many lipomas.  Symptoms of a lipoma  The main symptom of a lipoma is a soft lump under the skin that doesn t hurt unless it is pressing on a nerve. It may be small, around 1/4 inch across. Or it may be larger, up to 4 inches across or more.  There are different kinds of lipomas. The most common kind occurs under the skin of the shoulders, chest, back, belly, or under the arms. In some cases, a lipoma can occur on the legs. In rare cases, one may occur deeper in the body or in a muscle.  Treatment for a lipoma  In most  cases, a lipoma doesn t need treatment. Your healthcare provider may look at it during regular checkups to see if it changes.  But if the lipoma is painful or you want it removed for cosmetic reasons, it can be removed with surgery. The surgery is called excision. The lipoma will most likely not grow back after surgery. During surgery, the area around the lipoma is numbed. If you have a deep lipoma, you may need medicine to numb a larger area (regional anesthesia). Or you may need medicine to put you to sleep during the procedure (general anesthesia). Then the doctor makes a cut over the area of the lipoma. He or she removes the lump of fat. The cut is then closed with stitches.  Possible complications of a lipoma  A large lipoma inside the body can press on organs, nerves, or other tissues and cause problems. For example, it can cause problems with breathing or digestion.  Living with a lipoma  Your healthcare provider may look at the lipoma during regular checkups to see if it changes or is causing problems.  When to call your healthcare provider  Call your healthcare provider right away if you have any of these:    Lipoma that grows quickly, causes pain, or feels hard    New lipomas  [employer] last reviewed this educational content on 11/1/2018 2000-2021 [employer]. All rights reserved. This information is not intended as a substitute for professional medical care. Always follow your healthcare professional's instructions.           Patient Education     Lipoma, No Treatment  A lipoma is a non-cancerous (benign) tumor made up of fat tissue. It appears as a soft raised area, just under the skin. It's usually less than 2 inches across.   Home care  General information regarding lipoma includes:    No special care is needed for a lipoma.    You can consider removal for cosmetic reasons.    Sometimes lipomas are uncomfortable because they put pressure on surrounding tissues. This is also a reason to  have a lipoma removed.  Follow-up care  Follow up with your healthcare provider, or as advised if you want to have the lipoma removed at a later time.   When to seek medical advice  Call your healthcare provider right away if any of the following occur:    Redness, pain, tenderness, or drainage from the lipoma    Lipoma starts to enlarge, change shape, or become more solid    Changes in the color of the skin over the lipoma  [employer] last reviewed this educational content on 7/1/2019 2000-2021 [employer]. All rights reserved. This information is not intended as a substitute for professional medical care. Always follow your healthcare professional's instructions.

## 2021-10-27 ENCOUNTER — OFFICE VISIT (OUTPATIENT)
Dept: FAMILY MEDICINE | Facility: CLINIC | Age: 71
End: 2021-10-27
Payer: COMMERCIAL

## 2021-10-27 VITALS
SYSTOLIC BLOOD PRESSURE: 150 MMHG | WEIGHT: 113.3 LBS | OXYGEN SATURATION: 95 % | TEMPERATURE: 97.6 F | DIASTOLIC BLOOD PRESSURE: 81 MMHG | HEART RATE: 68 BPM | HEIGHT: 55 IN | RESPIRATION RATE: 16 BRPM | BODY MASS INDEX: 26.22 KG/M2

## 2021-10-27 DIAGNOSIS — E06.3 HYPOTHYROIDISM DUE TO HASHIMOTO'S THYROIDITIS: ICD-10-CM

## 2021-10-27 DIAGNOSIS — I10 BENIGN ESSENTIAL HYPERTENSION: ICD-10-CM

## 2021-10-27 DIAGNOSIS — D17.30 LIPOMA OF SKIN AND SUBCUTANEOUS TISSUE: ICD-10-CM

## 2021-10-27 DIAGNOSIS — Z12.31 ENCOUNTER FOR SCREENING MAMMOGRAM FOR MALIGNANT NEOPLASM OF BREAST: ICD-10-CM

## 2021-10-27 DIAGNOSIS — E55.9 VITAMIN D DEFICIENCY: ICD-10-CM

## 2021-10-27 DIAGNOSIS — E78.5 HYPERLIPIDEMIA LDL GOAL <100: ICD-10-CM

## 2021-10-27 DIAGNOSIS — Z13.0 SCREENING FOR DEFICIENCY ANEMIA: ICD-10-CM

## 2021-10-27 DIAGNOSIS — M81.0 SENILE OSTEOPOROSIS: ICD-10-CM

## 2021-10-27 DIAGNOSIS — Z13.1 SCREENING FOR DIABETES MELLITUS (DM): ICD-10-CM

## 2021-10-27 DIAGNOSIS — Z12.11 COLON CANCER SCREENING: ICD-10-CM

## 2021-10-27 DIAGNOSIS — B35.1 ONYCHOMYCOSIS: ICD-10-CM

## 2021-10-27 DIAGNOSIS — Z00.00 ENCOUNTER FOR MEDICARE ANNUAL WELLNESS EXAM: Primary | ICD-10-CM

## 2021-10-27 DIAGNOSIS — L65.9 HAIR LOSS: ICD-10-CM

## 2021-10-27 DIAGNOSIS — I10 HYPERTENSION WITH GOAL BLOOD PRESSURE LESS THAN 140/90: ICD-10-CM

## 2021-10-27 LAB
ALBUMIN SERPL-MCNC: 3.8 G/DL (ref 3.4–5)
ALP SERPL-CCNC: 83 U/L (ref 40–150)
ALT SERPL W P-5'-P-CCNC: 36 U/L (ref 0–50)
ANION GAP SERPL CALCULATED.3IONS-SCNC: 6 MMOL/L (ref 3–14)
AST SERPL W P-5'-P-CCNC: 22 U/L (ref 0–45)
BILIRUB SERPL-MCNC: 0.4 MG/DL (ref 0.2–1.3)
BUN SERPL-MCNC: 22 MG/DL (ref 7–30)
CALCIUM SERPL-MCNC: 9 MG/DL (ref 8.5–10.1)
CHLORIDE BLD-SCNC: 107 MMOL/L (ref 94–109)
CHOLEST SERPL-MCNC: 220 MG/DL
CO2 SERPL-SCNC: 26 MMOL/L (ref 20–32)
CREAT SERPL-MCNC: 0.72 MG/DL (ref 0.52–1.04)
ERYTHROCYTE [DISTWIDTH] IN BLOOD BY AUTOMATED COUNT: 13.1 % (ref 10–15)
FASTING STATUS PATIENT QL REPORTED: YES
FERRITIN SERPL-MCNC: 24 NG/ML (ref 8–252)
GFR SERPL CREATININE-BSD FRML MDRD: 84 ML/MIN/1.73M2
GLUCOSE BLD-MCNC: 92 MG/DL (ref 70–99)
HCT VFR BLD AUTO: 37.7 % (ref 35–47)
HDLC SERPL-MCNC: 89 MG/DL
HGB BLD-MCNC: 12.1 G/DL (ref 11.7–15.7)
LDLC SERPL CALC-MCNC: 115 MG/DL
MCH RBC QN AUTO: 29.7 PG (ref 26.5–33)
MCHC RBC AUTO-ENTMCNC: 32.1 G/DL (ref 31.5–36.5)
MCV RBC AUTO: 93 FL (ref 78–100)
NONHDLC SERPL-MCNC: 131 MG/DL
PLATELET # BLD AUTO: 267 10E3/UL (ref 150–450)
POTASSIUM BLD-SCNC: 3.8 MMOL/L (ref 3.4–5.3)
PROT SERPL-MCNC: 8 G/DL (ref 6.8–8.8)
RBC # BLD AUTO: 4.07 10E6/UL (ref 3.8–5.2)
SODIUM SERPL-SCNC: 139 MMOL/L (ref 133–144)
TRIGL SERPL-MCNC: 80 MG/DL
TSH SERPL DL<=0.005 MIU/L-ACNC: 1.69 MU/L (ref 0.4–4)
VIT B12 SERPL-MCNC: 342 PG/ML (ref 193–986)
WBC # BLD AUTO: 4.7 10E3/UL (ref 4–11)

## 2021-10-27 PROCEDURE — 82607 VITAMIN B-12: CPT | Performed by: FAMILY MEDICINE

## 2021-10-27 PROCEDURE — G0439 PPPS, SUBSEQ VISIT: HCPCS | Performed by: FAMILY MEDICINE

## 2021-10-27 PROCEDURE — 36415 COLL VENOUS BLD VENIPUNCTURE: CPT | Performed by: FAMILY MEDICINE

## 2021-10-27 PROCEDURE — 80053 COMPREHEN METABOLIC PANEL: CPT | Performed by: FAMILY MEDICINE

## 2021-10-27 PROCEDURE — 82306 VITAMIN D 25 HYDROXY: CPT | Performed by: FAMILY MEDICINE

## 2021-10-27 PROCEDURE — 82043 UR ALBUMIN QUANTITATIVE: CPT | Performed by: FAMILY MEDICINE

## 2021-10-27 PROCEDURE — 99213 OFFICE O/P EST LOW 20 MIN: CPT | Mod: 25 | Performed by: FAMILY MEDICINE

## 2021-10-27 PROCEDURE — G0008 ADMIN INFLUENZA VIRUS VAC: HCPCS | Performed by: FAMILY MEDICINE

## 2021-10-27 PROCEDURE — 80061 LIPID PANEL: CPT | Performed by: FAMILY MEDICINE

## 2021-10-27 PROCEDURE — 85027 COMPLETE CBC AUTOMATED: CPT | Performed by: FAMILY MEDICINE

## 2021-10-27 PROCEDURE — 90662 IIV NO PRSV INCREASED AG IM: CPT | Performed by: FAMILY MEDICINE

## 2021-10-27 PROCEDURE — 84443 ASSAY THYROID STIM HORMONE: CPT | Performed by: FAMILY MEDICINE

## 2021-10-27 PROCEDURE — 82728 ASSAY OF FERRITIN: CPT | Performed by: FAMILY MEDICINE

## 2021-10-27 RX ORDER — LOSARTAN POTASSIUM AND HYDROCHLOROTHIAZIDE 25; 100 MG/1; MG/1
1 TABLET ORAL EVERY MORNING
Qty: 90 TABLET | Refills: 0 | Status: SHIPPED | OUTPATIENT
Start: 2021-10-27 | End: 2021-11-26

## 2021-10-27 RX ORDER — ATORVASTATIN CALCIUM 20 MG/1
20 TABLET, FILM COATED ORAL EVERY EVENING
Qty: 90 TABLET | Refills: 3 | Status: SHIPPED | OUTPATIENT
Start: 2021-10-27 | End: 2022-10-10

## 2021-10-27 ASSESSMENT — ENCOUNTER SYMPTOMS
ABDOMINAL PAIN: 0
BREAST MASS: 0
CHILLS: 0
HEMATURIA: 0

## 2021-10-27 ASSESSMENT — ACTIVITIES OF DAILY LIVING (ADL): CURRENT_FUNCTION: NO ASSISTANCE NEEDED

## 2021-10-27 ASSESSMENT — PAIN SCALES - GENERAL: PAINLEVEL: NO PAIN (0)

## 2021-10-27 ASSESSMENT — MIFFLIN-ST. JEOR: SCORE: 866.06

## 2021-10-28 LAB
CREAT UR-MCNC: 98 MG/DL
DEPRECATED CALCIDIOL+CALCIFEROL SERPL-MC: 38 UG/L (ref 20–75)
MICROALBUMIN UR-MCNC: 11 MG/L
MICROALBUMIN/CREAT UR: 11.22 MG/G CR (ref 0–25)

## 2021-10-28 NOTE — RESULT ENCOUNTER NOTE
Dear Bree,  Your lab tests showed normal thyroid functions.  As we discussed, you will hold your levothyroxine for the next couple of months and repeat the labs before we see you in the office in 2 months.  Your hemoglobin is normal with no concern for anemia, this is good.  Your fasting blood sugar is in good range with no concern for diabetes, your liver and kidney functions are normal.  Your vitamin B12 is in good range, this is good.  Your fasting cholesterol numbers are still elevated.  As we discussed today, I would recommend to stop taking the lovastatin and start taking Lipitor 20 mg daily along with a baby aspirin 81 mg daily.  I have sent prescriptions for both to your pharmacy.  We will recheck your fasting cholesterol in 2 months at your next visit   Let me know if you have any questions. Take care.  Tobias Carlos MD

## 2021-10-29 ENCOUNTER — TELEPHONE (OUTPATIENT)
Dept: FAMILY MEDICINE | Facility: CLINIC | Age: 71
End: 2021-10-29

## 2021-10-29 NOTE — TELEPHONE ENCOUNTER
Patient called and needed clarification on medications she needs to take according to her visit on 10/27.  Writer clarified to her and told her that PCP wanted her to stop taking lovastatin and start taking lipitor 20mg with the baby aspirin.   Patient also wanted to know what was the levothyroxine for.    Patient understood what she had to take and had no other questions.    Vilma Norman RN  [facility]

## 2021-11-02 DIAGNOSIS — B35.1 ONYCHOMYCOSIS: ICD-10-CM

## 2021-11-03 RX ORDER — TERBINAFINE HYDROCHLORIDE 250 MG/1
250 TABLET ORAL DAILY
Qty: 30 TABLET | Refills: 2 | Status: SHIPPED | OUTPATIENT
Start: 2021-11-03 | End: 2022-04-21

## 2021-11-03 NOTE — TELEPHONE ENCOUNTER
Pending Prescriptions:                       Disp   Refills    terbinafine (LAMISIL) 250 MG tablet       30 tab*2            Sig: Take 1 tablet (250 mg) by mouth daily    Routing refill request to provider for review/approval because:  Drug not on the [facility] refill protocol     Viji De La Rosa RN

## 2021-11-11 ENCOUNTER — ANCILLARY PROCEDURE (OUTPATIENT)
Dept: MAMMOGRAPHY | Facility: CLINIC | Age: 71
End: 2021-11-11
Attending: FAMILY MEDICINE
Payer: COMMERCIAL

## 2021-11-11 DIAGNOSIS — Z12.31 VISIT FOR SCREENING MAMMOGRAM: ICD-10-CM

## 2021-11-11 PROCEDURE — 77063 BREAST TOMOSYNTHESIS BI: CPT | Mod: GC | Performed by: RADIOLOGY

## 2021-11-11 PROCEDURE — 77067 SCR MAMMO BI INCL CAD: CPT | Mod: GC | Performed by: RADIOLOGY

## 2021-11-22 ENCOUNTER — VIRTUAL VISIT (OUTPATIENT)
Dept: FAMILY MEDICINE | Facility: CLINIC | Age: 71
End: 2021-11-22
Payer: COMMERCIAL

## 2021-11-22 DIAGNOSIS — E78.5 HYPERLIPIDEMIA LDL GOAL <100: Primary | ICD-10-CM

## 2021-11-22 PROCEDURE — 99207 PR NO CHARGE LOS: CPT | Performed by: FAMILY MEDICINE

## 2021-11-22 NOTE — PROGRESS NOTES
Patient originally scheduled this telephone visit to address her lipoma, which we discussed during her physical less than a month ago.  There is no change in her symptoms, she had some intermittent pain in the lipoma while she tries to lift her left hand.  Otherwise she does not have any pain she is wondering if she can take Tylenol as needed for pain  Patient feels she would rather come to the office for an exam if the pain gets worse which I agree.  She is due for follow-up for her lipids in January anyway, she will call to schedule for that.  We can also check her lipoma that time or sooner if needed  Patient verbalised understanding and is agreeable to the plan.  Dysuria no charge visit

## 2021-12-27 ENCOUNTER — LAB (OUTPATIENT)
Dept: LAB | Facility: CLINIC | Age: 71
End: 2021-12-27
Payer: COMMERCIAL

## 2021-12-27 DIAGNOSIS — E78.5 HYPERLIPIDEMIA LDL GOAL <100: ICD-10-CM

## 2021-12-27 LAB
ALT SERPL W P-5'-P-CCNC: 33 U/L (ref 0–50)
CHOLEST SERPL-MCNC: 197 MG/DL
FASTING STATUS PATIENT QL REPORTED: YES
HDLC SERPL-MCNC: 95 MG/DL
LDLC SERPL CALC-MCNC: 84 MG/DL
NONHDLC SERPL-MCNC: 102 MG/DL
TRIGL SERPL-MCNC: 90 MG/DL

## 2021-12-27 PROCEDURE — 84460 ALANINE AMINO (ALT) (SGPT): CPT

## 2021-12-27 PROCEDURE — 80061 LIPID PANEL: CPT

## 2021-12-27 PROCEDURE — 36415 COLL VENOUS BLD VENIPUNCTURE: CPT

## 2022-01-05 ENCOUNTER — ANCILLARY PROCEDURE (OUTPATIENT)
Dept: GENERAL RADIOLOGY | Facility: CLINIC | Age: 72
End: 2022-01-05
Attending: PODIATRIST
Payer: COMMERCIAL

## 2022-01-05 ENCOUNTER — OFFICE VISIT (OUTPATIENT)
Dept: PODIATRY | Facility: CLINIC | Age: 72
End: 2022-01-05
Payer: COMMERCIAL

## 2022-01-05 VITALS
HEART RATE: 74 BPM | SYSTOLIC BLOOD PRESSURE: 134 MMHG | BODY MASS INDEX: 25.57 KG/M2 | DIASTOLIC BLOOD PRESSURE: 70 MMHG | WEIGHT: 110 LBS

## 2022-01-05 DIAGNOSIS — M79.671 PAIN IN RIGHT FOOT: ICD-10-CM

## 2022-01-05 DIAGNOSIS — L97.519 ULCER OF FOOT, RIGHT, WITH UNSPECIFIED SEVERITY (H): ICD-10-CM

## 2022-01-05 DIAGNOSIS — B35.1 ONYCHOMYCOSIS: Primary | ICD-10-CM

## 2022-01-05 DIAGNOSIS — M20.5X1 HALLUX LIMITUS OF RIGHT FOOT: ICD-10-CM

## 2022-01-05 PROCEDURE — 73630 X-RAY EXAM OF FOOT: CPT | Mod: RT | Performed by: RADIOLOGY

## 2022-01-05 PROCEDURE — 99214 OFFICE O/P EST MOD 30 MIN: CPT | Performed by: PODIATRIST

## 2022-01-05 NOTE — LETTER
1/5/2022         RE: Bree Carrillo  [address]  [facility] [address]        Dear Colleague,    Thank you for referring your patient, Bree Carrillo, to the [facility]. Please see a copy of my visit note below.     Subjective:    Pt is seen today for numerous complaints.  She has been on Lamisil for 6 months.  She is here to reevaluate nails on her right foot for this.  She also has wound on the end of her right fourth toe.  She is not sure how she got this.  Denies purulence or odor.  Some pain with this.  Also has pain in her right first MTPJ.  Had pain here over 10 years ago.  Has been hurting ever since her surgery.  Denies weakness or increased deformity.  She is wondering what she can do to make this feel better.    ROS:  Above         Allergies   Allergen Reactions     Iohexol Hives     Pt experienced sneeze, itching followed by hives post 100cc Omni 350 for CT Scan.   Observed with IV in place x 45 minutes.   Given 25mg PO Benadryl with OK for 50mg to be taken every 8 hours for the next 24 hours per Dr Rausch.     JRS       Current Outpatient Medications   Medication Sig Dispense Refill     aspirin (ASA) 81 MG EC tablet Take 1 tablet (81 mg) by mouth daily 90 tablet 3     atorvastatin (LIPITOR) 20 MG tablet Take 1 tablet (20 mg) by mouth every evening New RX replacing mevacor 90 tablet 3     Calcium Citrate-Vitamin D (CITRACAL + D) 250-200 MG-UNIT TABS Citracal Calcium 400mg 2 tabs(800mg) BID (Patient taking differently: Citracal Calcium 400mg 1  tabs(800mg) BID)       levothyroxine (SYNTHROID/LEVOTHROID) 50 MCG tablet Take 1 tablet (50 mcg) by mouth daily 90 tablet 3     losartan-hydrochlorothiazide (HYZAAR) 100-25 MG tablet Take 1 tablet by mouth every morning This is replacing the losartan 90 tablet 1     lovastatin (MEVACOR) 20 MG tablet Take 1 tablet (20 mg) by mouth At Bedtime 90 tablet 3     mometasone (ELOCON) 0.1 % solution (lotion)        Multiple  Vitamins-Minerals (ALIVE WOMENS GUMMY PO)        terbinafine (LAMISIL) 250 MG tablet Take 1 tablet (250 mg) by mouth daily 30 tablet 2       Patient Active Problem List   Diagnosis     Anemia     Closed right cuboid fracture     Falls     GERD (gastroesophageal reflux disease)     GI bleed     Hyperlipidemia with target LDL less than 130     Benign essential hypertension     Iron deficiency anemia     Osteoporosis     Right foot pain     Vitamin D deficiency     Senile osteoporosis     Adverse effect of other drugs, medicaments and biological substances, sequela     Onychomycosis       Past Medical History:   Diagnosis Date     Hypertension        Past Surgical History:   Procedure Laterality Date     LASIK BILATERAL Bilateral 2012    Dr Chavez       Family History   Problem Relation Age of Onset     No Known Problems Mother      No Known Problems Father      No Known Problems Maternal Grandmother      No Known Problems Maternal Grandfather      No Known Problems Paternal Grandmother      No Known Problems Paternal Grandfather      Coronary Artery Disease Brother      No Known Problems Sister      No Known Problems Son      No Known Problems Daughter      No Known Problems Maternal Half-Brother      No Known Problems Maternal Half-Sister      No Known Problems Paternal Half-Brother      No Known Problems Paternal Half-Sister      No Known Problems Niece      No Known Problems Nephew      No Known Problems Cousin      No Known Problems Other      Diabetes No family hx of      Hypertension No family hx of      Hyperlipidemia No family hx of      Cerebrovascular Disease No family hx of      Breast Cancer No family hx of      Colon Cancer No family hx of      Prostate Cancer No family hx of      Other Cancer No family hx of      Depression No family hx of      Anxiety Disorder No family hx of      Mental Illness No family hx of      Substance Abuse No family hx of      Anesthesia Reaction No family hx of      Asthma No  family hx of      Osteoporosis No family hx of      Genetic Disorder No family hx of      Thyroid Disease No family hx of      Obesity No family hx of      Unknown/Adopted No family hx of      Glaucoma No family hx of      Macular Degeneration No family hx of        Social History     Tobacco Use     Smoking status: Never Smoker     Smokeless tobacco: Never Used   Substance Use Topics     Alcohol use: No         Exam:    Vitals: /70   Pulse 74   Wt 49.9 kg (110 lb)   BMI 25.57 kg/m    BMI: Body mass index is 25.57 kg/m .  Height: Data Unavailable    Constitutional/ general:  Pt is in no apparent distress, appears well-nourished.  Cooperative with history and physical exam.     Psych:  The patient answered questions appropriately.  Normal affect.  Seems to have reasonable expectations, in terms of treatment.     Lungs:  Non labored breathing, non labored speech. No cough.  No audible wheezing. Even, quiet breathing.       Vascular:   Somewhat difficult to palpate pedal pulses.  Capillary refill approximately 3 seconds in the digits.  No hair growth noted    Neuro:  Alert and oriented x 3. Coordinated gait.  Light touch sensation is intact    Derm: Normal texture and turgor.  No erythema, ecchymosis, or cyanosis.   Lesser nails right foot still thickened.    Musculoskeletal:    Lower extremity muscle strength is normal.  Patient is ambulatory without an assistive device or brace.  No gross deformities.  Normal arch.    Normal ROM all forefoot and rearfoot joints except [id number] MTPJ.  Patient has pain with range of motion.  Some varus of hallux.  No bone prominence on dorsum of the joint.  Well-healed incision noted.  Small superficial ulcer right fourth toe distal.  No sinus tracts purulence or odor.  No erythema or edema.    X-rays show varus of all digits with right first MTPJ bone-on-bone.  Can see hardware from past [place] osteotomy    Assessment:  Hallux Limitus right foot                          Onychomycosis                         Right fourth toe ulcer    Plan:  Discussed with patient lesser nails still thickened on right foot.  Will stop Lamisil as she has been on the 6 months and should be resolved by now.  Discussed superficial noninfected wound right fourth toe.  We dressed this with a Band-Aid.  We gave her a crest pad to offload this.  We had her walk and she states felt much better with this.  Will return the clinic in 2 weeks if this is not healing.  Difficulty in palpating pedal pulses a concern for this.  X-rays taken today right foot.  Discussed etiology and treatment options in detail w/ the pt regarding hallux limitus.  Xrays reviewed with patient.  Recommended wearing a stiff soled shoe at all times both inside and outside and made several recommendations.  Avoid activities that bother this and we discussed what will bother this.  RTC as needed.  30 minutes spent in total time counseling patient, reviewing medical records, and coordinating care    Cedrick Hawley DPM, FACFAS               Again, thank you for allowing me to participate in the care of your patient.        Sincerely,        Cedrick Hawley DPM

## 2022-01-05 NOTE — PROGRESS NOTES
Subjective:    Pt is seen today for numerous complaints.  She has been on Lamisil for 6 months.  She is here to reevaluate nails on her right foot for this.  She also has wound on the end of her right fourth toe.  She is not sure how she got this.  Denies purulence or odor.  Some pain with this.  Also has pain in her right first MTPJ.  Had pain here over 10 years ago.  Has been hurting ever since her surgery.  Denies weakness or increased deformity.  She is wondering what she can do to make this feel better.    ROS:  Above         Allergies   Allergen Reactions     Iohexol Hives     Pt experienced sneeze, itching followed by hives post 100cc Omni 350 for CT Scan.   Observed with IV in place x 45 minutes.   Given 25mg PO Benadryl with OK for 50mg to be taken every 8 hours for the next 24 hours per Dr Rausch.     JRS       Current Outpatient Medications   Medication Sig Dispense Refill     aspirin (ASA) 81 MG EC tablet Take 1 tablet (81 mg) by mouth daily 90 tablet 3     atorvastatin (LIPITOR) 20 MG tablet Take 1 tablet (20 mg) by mouth every evening New RX replacing mevacor 90 tablet 3     Calcium Citrate-Vitamin D (CITRACAL + D) 250-200 MG-UNIT TABS Citracal Calcium 400mg 2 tabs(800mg) BID (Patient taking differently: Citracal Calcium 400mg 1  tabs(800mg) BID)       levothyroxine (SYNTHROID/LEVOTHROID) 50 MCG tablet Take 1 tablet (50 mcg) by mouth daily 90 tablet 3     losartan-hydrochlorothiazide (HYZAAR) 100-25 MG tablet Take 1 tablet by mouth every morning This is replacing the losartan 90 tablet 1     lovastatin (MEVACOR) 20 MG tablet Take 1 tablet (20 mg) by mouth At Bedtime 90 tablet 3     mometasone (ELOCON) 0.1 % solution (lotion)        Multiple Vitamins-Minerals (ALIVE WOMENS GUMMY PO)        terbinafine (LAMISIL) 250 MG tablet Take 1 tablet (250 mg) by mouth daily 30 tablet 2       Patient Active Problem List   Diagnosis     Anemia     Closed right cuboid fracture     Falls     GERD (gastroesophageal  reflux disease)     GI bleed     Hyperlipidemia with target LDL less than 130     Benign essential hypertension     Iron deficiency anemia     Osteoporosis     Right foot pain     Vitamin D deficiency     Senile osteoporosis     Adverse effect of other drugs, medicaments and biological substances, sequela     Onychomycosis       Past Medical History:   Diagnosis Date     Hypertension        Past Surgical History:   Procedure Laterality Date     LASIK BILATERAL Bilateral 2012    Dr Chavez       Family History   Problem Relation Age of Onset     No Known Problems Mother      No Known Problems Father      No Known Problems Maternal Grandmother      No Known Problems Maternal Grandfather      No Known Problems Paternal Grandmother      No Known Problems Paternal Grandfather      Coronary Artery Disease Brother      No Known Problems Sister      No Known Problems Son      No Known Problems Daughter      No Known Problems Maternal Half-Brother      No Known Problems Maternal Half-Sister      No Known Problems Paternal Half-Brother      No Known Problems Paternal Half-Sister      No Known Problems Niece      No Known Problems Nephew      No Known Problems Cousin      No Known Problems Other      Diabetes No family hx of      Hypertension No family hx of      Hyperlipidemia No family hx of      Cerebrovascular Disease No family hx of      Breast Cancer No family hx of      Colon Cancer No family hx of      Prostate Cancer No family hx of      Other Cancer No family hx of      Depression No family hx of      Anxiety Disorder No family hx of      Mental Illness No family hx of      Substance Abuse No family hx of      Anesthesia Reaction No family hx of      Asthma No family hx of      Osteoporosis No family hx of      Genetic Disorder No family hx of      Thyroid Disease No family hx of      Obesity No family hx of      Unknown/Adopted No family hx of      Glaucoma No family hx of      Macular Degeneration No family hx of         Social History     Tobacco Use     Smoking status: Never Smoker     Smokeless tobacco: Never Used   Substance Use Topics     Alcohol use: No         Exam:    Vitals: /70   Pulse 74   Wt 49.9 kg (110 lb)   BMI 25.57 kg/m    BMI: Body mass index is 25.57 kg/m .  Height: Data Unavailable    Constitutional/ general:  Pt is in no apparent distress, appears well-nourished.  Cooperative with history and physical exam.     Psych:  The patient answered questions appropriately.  Normal affect.  Seems to have reasonable expectations, in terms of treatment.     Lungs:  Non labored breathing, non labored speech. No cough.  No audible wheezing. Even, quiet breathing.       Vascular:   Somewhat difficult to palpate pedal pulses.  Capillary refill approximately 3 seconds in the digits.  No hair growth noted    Neuro:  Alert and oriented x 3. Coordinated gait.  Light touch sensation is intact    Derm: Normal texture and turgor.  No erythema, ecchymosis, or cyanosis.   Lesser nails right foot still thickened.    Musculoskeletal:    Lower extremity muscle strength is normal.  Patient is ambulatory without an assistive device or brace.  No gross deformities.  Normal arch.    Normal ROM all forefoot and rearfoot joints except [id number] MTPJ.  Patient has pain with range of motion.  Some varus of hallux.  No bone prominence on dorsum of the joint.  Well-healed incision noted.  Small superficial ulcer right fourth toe distal.  No sinus tracts purulence or odor.  No erythema or edema.    X-rays show varus of all digits with right first MTPJ bone-on-bone.  Can see hardware from past [place] osteotomy    Assessment:  Hallux Limitus right foot                         Onychomycosis                         Right fourth toe ulcer    Plan:  Discussed with patient lesser nails still thickened on right foot.  Will stop Lamisil as she has been on the 6 months and should be resolved by now.  Discussed superficial noninfected wound  right fourth toe.  We dressed this with a Band-Aid.  We gave her a crest pad to offload this.  We had her walk and she states felt much better with this.  Will return the clinic in 2 weeks if this is not healing.  Difficulty in palpating pedal pulses a concern for this.  X-rays taken today right foot.  Discussed etiology and treatment options in detail w/ the pt regarding hallux limitus.  Xrays reviewed with patient.  Recommended wearing a stiff soled shoe at all times both inside and outside and made several recommendations.  Avoid activities that bother this and we discussed what will bother this.  RTC as needed.  30 minutes spent in total time counseling patient, reviewing medical records, and coordinating care    Cedrick Hawley DPM, FACFAS

## 2022-01-05 NOTE — PATIENT INSTRUCTIONS
We wish you continued good healing. If you have any questions or concerns, please do not hesitate to contact us at  [phone]    [facility] (secure e-mail communication and access to your chart) to send a message or to make an appointment.    Please remember to call and schedule a follow up appointment if one was recommended at your earliest convenience.     PODIATRY CLINIC HOURS  TELEPHONE NUMBER    Dr. Cedrick DAHLPDANIELLE [facility]        Clinics:  Jd Kenney CMA   Tuesday 1PM-6PM  [place]  Wednesday 745AM-330PM  [place]  Thursday/Friday 745AM-230PM  [place] [facility] APPOINTMENTS  [phone]    [place] APPOINTMENTS  [phone]          If you need a medication refill, please contact us you may need lab work and/or a follow up visit prior to your refill (i.e. Antifungal medications).    If MRI needed please call Imaging at [phone] or [phone]    HOW DO I GET MY KNEE SCOOTER? Knee scooters can be picked up at ANY Medical Supply stores with your knee scooter Prescription.  OR    Bring your signed prescription to an [facility] Medical Equipment showroom.

## 2022-01-19 DIAGNOSIS — E03.8 SUBCLINICAL HYPOTHYROIDISM: ICD-10-CM

## 2022-01-21 RX ORDER — LEVOTHYROXINE SODIUM 50 UG/1
50 TABLET ORAL DAILY
Qty: 90 TABLET | Refills: 3 | Status: SHIPPED | OUTPATIENT
Start: 2022-01-21 | End: 2022-04-21

## 2022-01-21 NOTE — TELEPHONE ENCOUNTER
LEVOTHYROXINE 50 MCG TABLET  90 Tabs, 3 Refills sent to pharmacy  Last visit: 2/12/2021  Next visit: /4/2022    Hui Schmitt RN  Central Triage Red Flags/Med Refills

## 2022-02-01 ENCOUNTER — TELEPHONE (OUTPATIENT)
Dept: ENDOCRINOLOGY | Facility: CLINIC | Age: 72
End: 2022-02-01
Payer: COMMERCIAL

## 2022-02-01 NOTE — TELEPHONE ENCOUNTER
[facility] Call Center    Phone Message    May a detailed message be left on voicemail: yes     Reason for Call: Order(s): Other:   Reason for requested: any orders needed for upcoming 5/27/22 appt    Date needed: whenever possible    Provider name: Toño    Pt would like to know if she should come to appt fasting on 5/27, as well as if any other orders need to be completed prior to upcoming appt.  Please review and call pt.       Action Taken: Message routed to:  Adult Clinics: Endocrinology p [id number]    Travel Screening: Not Applicable

## 2022-02-17 NOTE — TELEPHONE ENCOUNTER
Patient contacted and assisted with appointment sooner.    Luba Montes [facility]  Adult Endocrinology  [facility]

## 2022-02-17 NOTE — TELEPHONE ENCOUNTER
[facility] Call Center    Phone Message    May a detailed message be left on voicemail: yes- leave a detailed message    Reason for Call: Order(s): Other:   Reason for requested: labs/ prolia  Date needed: ASAP    Provider name: Toño    Pt would like guidance on Dexa, labs and prolia injection.  Per pt, prolia is due this month, but she is wondering if she needs to see provider first, or if labs and prolia can be done piror to.  Pt is also wondering when her next DEXA is due.  Please place any necessary orders and call pt with next steps.          Action Taken: Message routed to:  Adult Clinics: Endocrinology p [id number]    Travel Screening: Not Applicable

## 2022-02-25 ENCOUNTER — OFFICE VISIT (OUTPATIENT)
Dept: ENDOCRINOLOGY | Facility: CLINIC | Age: 72
End: 2022-02-25
Payer: COMMERCIAL

## 2022-02-25 VITALS
SYSTOLIC BLOOD PRESSURE: 125 MMHG | WEIGHT: 113.5 LBS | HEART RATE: 77 BPM | DIASTOLIC BLOOD PRESSURE: 77 MMHG | BODY MASS INDEX: 26.38 KG/M2 | OXYGEN SATURATION: 100 %

## 2022-02-25 DIAGNOSIS — E03.9 HYPOTHYROIDISM, UNSPECIFIED TYPE: ICD-10-CM

## 2022-02-25 DIAGNOSIS — M81.0 SENILE OSTEOPOROSIS: Primary | ICD-10-CM

## 2022-02-25 LAB
T4 FREE SERPL-MCNC: 1.05 NG/DL (ref 0.76–1.46)
TSH SERPL DL<=0.005 MIU/L-ACNC: 2.9 MU/L (ref 0.4–4)

## 2022-02-25 PROCEDURE — 84443 ASSAY THYROID STIM HORMONE: CPT | Performed by: INTERNAL MEDICINE

## 2022-02-25 PROCEDURE — 84439 ASSAY OF FREE THYROXINE: CPT | Performed by: INTERNAL MEDICINE

## 2022-02-25 PROCEDURE — 36415 COLL VENOUS BLD VENIPUNCTURE: CPT | Performed by: INTERNAL MEDICINE

## 2022-02-25 PROCEDURE — 99214 OFFICE O/P EST MOD 30 MIN: CPT | Performed by: INTERNAL MEDICINE

## 2022-02-25 PROCEDURE — 82306 VITAMIN D 25 HYDROXY: CPT | Performed by: INTERNAL MEDICINE

## 2022-02-25 RX ORDER — ALBUTEROL SULFATE 0.83 MG/ML
2.5 SOLUTION RESPIRATORY (INHALATION)
Status: CANCELLED | OUTPATIENT
Start: 2022-02-25

## 2022-02-25 RX ORDER — HEPARIN SODIUM (PORCINE) LOCK FLUSH IV SOLN 100 UNIT/ML 100 UNIT/ML
5 SOLUTION INTRAVENOUS
Status: CANCELLED | OUTPATIENT
Start: 2022-02-25

## 2022-02-25 RX ORDER — ZOLEDRONIC ACID 5 MG/100ML
5 INJECTION, SOLUTION INTRAVENOUS ONCE
Status: CANCELLED
Start: 2022-02-25 | End: 2022-02-25

## 2022-02-25 RX ORDER — HEPARIN SODIUM,PORCINE 10 UNIT/ML
5 VIAL (ML) INTRAVENOUS
Status: CANCELLED | OUTPATIENT
Start: 2022-02-25

## 2022-02-25 RX ORDER — ALBUTEROL SULFATE 90 UG/1
1-2 AEROSOL, METERED RESPIRATORY (INHALATION)
Status: CANCELLED
Start: 2022-02-25

## 2022-02-25 RX ORDER — MEPERIDINE HYDROCHLORIDE 25 MG/ML
25 INJECTION INTRAMUSCULAR; INTRAVENOUS; SUBCUTANEOUS EVERY 30 MIN PRN
Status: CANCELLED | OUTPATIENT
Start: 2022-02-25

## 2022-02-25 RX ORDER — DIPHENHYDRAMINE HYDROCHLORIDE 50 MG/ML
50 INJECTION INTRAMUSCULAR; INTRAVENOUS
Status: CANCELLED
Start: 2022-02-25

## 2022-02-25 RX ORDER — NALOXONE HYDROCHLORIDE 0.4 MG/ML
0.2 INJECTION, SOLUTION INTRAMUSCULAR; INTRAVENOUS; SUBCUTANEOUS
Status: CANCELLED | OUTPATIENT
Start: 2022-02-25

## 2022-02-25 RX ORDER — METHYLPREDNISOLONE SODIUM SUCCINATE 125 MG/2ML
125 INJECTION, POWDER, LYOPHILIZED, FOR SOLUTION INTRAMUSCULAR; INTRAVENOUS
Status: CANCELLED
Start: 2022-02-25

## 2022-02-25 RX ORDER — EPINEPHRINE 1 MG/ML
0.3 INJECTION, SOLUTION INTRAMUSCULAR; SUBCUTANEOUS EVERY 5 MIN PRN
Status: CANCELLED | OUTPATIENT
Start: 2022-02-25

## 2022-02-25 NOTE — PROGRESS NOTES
- Endocrinology Follow up -    Reason for visit/consult:  Osteoporosis    Primary care provider: Clinic, [facility]      Assessment and Plan  70 year old female with osteoporosis    # Osteoporosis  Compared to 2015, 2017 bone density showed lumbar slight improvement from a -2.7 up to -2.4.  However left hip decreased bone density, which she has had pain recently.  Prolia seems working, we will modify calcium supplement and continue Prolia for now.       - switch from Prolia to Reclast this year (she was paying $3000 per each injection with Prolia)    - Bone density spring 2022    - continue Ca supplement 2 tabs daily      #Subclinical hypothyroidism   Was on LT4 50 mcg and held since 10/2021    - TSH, free T4 recheck whether we need to resume or ok not to have    35 minutes spent on the date of the encounter doing chart review, history and exam, documentation and further activities as noted above.    Deanna Lundberg MD  Staff Physician  Endocrinology and Metabolism  [facility]  License: [place] [id number]  Pager: [phone]    Interval History as of 2/25/2022 : Patient has been doing well. Last seen 1 year ago . Medication compliance good for Ca supplement . New event includes: no pertinent medical event noted, but mentioned Prolia cost $3000 out of pocket each time  .  Interval History as of 2/12/2021 : Patient has been doing well. Last year skipped prolia since COVID. No fractures. Taking citracal petite 2 tab but started to have bothering GI and stopped.   HPI: A 68 female her for osteoporosis. Osteoporosis diagnosed in 2015 with T score -2.7 lumber, by bone density. Last few month left pain hip and hands. Last 2 years she has been on prolia, so far received 4 times. Last dose of Prolia was last winter. Calcium carbnate prescribed but could not swallow because of the size.   Currently switched to Calcium  carbonate gummies OTC unknown dose (4 tab).  She has never tried Fosamax.     Prior fragility fracture: no  Parental history of hip fracture: no  Rheumatoid arthritis: no  Secondary cause of osteoporosis: no  Body habitus (BMI less than or equal to 20): no  Family history of osteoporosis: no  Sedentary lifestyle: no  Current tabacco smoking: no  History of thyroid disorder: no  Calcuim and Vitmin D supplements: calicum carbonate gummies  Other supplement or bone treatment: Prolia   Medication use (PPI, epileptic medications etc): no  Early menopause (female): around 47.     Past Medical/Surgical History:  Past Medical History:   Diagnosis Date     Hypertension      Past Surgical History:   Procedure Laterality Date     LASIK BILATERAL Bilateral 2012    Dr Chavez       Allergies:  Allergies   Allergen Reactions     Iohexol Hives     Pt experienced sneeze, itching followed by hives post 100cc Omni 350 for CT Scan.   Observed with IV in place x 45 minutes.   Given 25mg PO Benadryl with OK for 50mg to be taken every 8 hours for the next 24 hours per Dr Rausch.     JRS       Current Medications   Current Outpatient Medications   Medication     aspirin (ASA) 81 MG EC tablet     atorvastatin (LIPITOR) 20 MG tablet     Calcium Citrate-Vitamin D (CITRACAL + D) 250-200 MG-UNIT TABS     Ferrous Sulfate (IRON PO)     losartan-hydrochlorothiazide (HYZAAR) 100-25 MG tablet     Multiple Vitamins-Minerals (ALIVE WOMENS GUMMY PO)     levothyroxine (SYNTHROID/LEVOTHROID) 50 MCG tablet     lovastatin (MEVACOR) 20 MG tablet     mometasone (ELOCON) 0.1 % solution (lotion)     terbinafine (LAMISIL) 250 MG tablet     No current facility-administered medications for this visit.       Family History:  Family History   Problem Relation Age of Onset     No Known Problems Mother      No Known Problems Father      No Known Problems Maternal Grandmother      No Known Problems Maternal Grandfather      No Known Problems Paternal Grandmother       No Known Problems Paternal Grandfather      Coronary Artery Disease Brother      No Known Problems Sister      No Known Problems Son      No Known Problems Daughter      No Known Problems Maternal Half-Brother      No Known Problems Maternal Half-Sister      No Known Problems Paternal Half-Brother      No Known Problems Paternal Half-Sister      No Known Problems Niece      No Known Problems Nephew      No Known Problems Cousin      No Known Problems Other      Diabetes No family hx of      Hypertension No family hx of      Hyperlipidemia No family hx of      Cerebrovascular Disease No family hx of      Breast Cancer No family hx of      Colon Cancer No family hx of      Prostate Cancer No family hx of      Other Cancer No family hx of      Depression No family hx of      Anxiety Disorder No family hx of      Mental Illness No family hx of      Substance Abuse No family hx of      Anesthesia Reaction No family hx of      Asthma No family hx of      Osteoporosis No family hx of      Genetic Disorder No family hx of      Thyroid Disease No family hx of      Obesity No family hx of      Unknown/Adopted No family hx of      Glaucoma No family hx of      Macular Degeneration No family hx of        Social History:  Social History     Tobacco Use     Smoking status: Never Smoker     Smokeless tobacco: Never Used   Substance Use Topics     Alcohol use: No   lives with , adult kids (37, 35). 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     ROS:  Full review of systems taken with the help of the intake sheet. Otherwise a complete 14 point review of systems was taken and is negative unless stated in the history above.    Physical Exam:   Blood pressure 125/77, pulse 77, weight 51.5 kg (113 lb 8 oz), SpO2 100 %, not currently breastfeeding.    General: well appearing, no acute distress, pleasant and conversant,   Mental Status/neuro: alert and oriented  Face: symmetrical, normal facial color  Eyes: anicteric, PERRL, no  proptosis or lid lag  Neck: suppler, no lymphadenopahty  Thyroid: normal size and texture, no nodule palpable, no bruits  Back: No scoliosis no kyphosis  Heart: regular rhythm, S1S2, no murmur appreciated  Lung: clear to auscultation bilaterally  Abdomen: soft, NT/ND, no hepatomegaly  Legs: no swelling or edema      Labs : I reviewed data from epic and extract and summarize the pertinent data here.   Lab Results   Component Value Date     07/23/2018      Lab Results   Component Value Date    POTASSIUM 4.0 07/23/2018     Lab Results   Component Value Date    CHLORIDE 107 07/23/2018     Lab Results   Component Value Date    DEVAN 8.3 07/23/2018     Lab Results   Component Value Date    CO2 29 07/23/2018     Lab Results   Component Value Date    BUN 20 07/23/2018     Lab Results   Component Value Date    CR 0.70 07/23/2018     Lab Results   Component Value Date    GLC 90 07/23/2018     Lab Results   Component Value Date    TSH 2.35 07/23/2018     Lab Results   Component Value Date    T4 0.96 06/28/2018       Bone Denisty: 8/4/2017: I also personally reviewed the original images and explained to the patient .      1.  This patient's T-score meets the World Health Organization (WHO)   criteria for osteoporosis at one or more measured sites (T-score -2.5 or   below).  The risk of osteoporotic fracture increases approximately   two-fold for each 1.0 SD decrease in T-score.    2.  Moderate levoscoliosis of lumbar spine.       COMPARISON:  Comparison with previous study dated 06/17/2015 shows:    There was a statistically significant increase in the bone mineral density   in the spine.    There was no statistically significant change in the bone mineral density   in the right hip.    There was a statistically significant decrease in the bone mineral density   in the left hip.      Comparison with baseline study dated 09/05/2013 shows:    There was a statistically significant increase in the bone mineral density   in the  spine.    There was no statistically significant change in the bone mineral density   in the right hip.    There was a statistically significant decrease in the bone mineral density   in the left hip.      Statistical significance is determined by the least significant change   (LSC) for the scanner and operators at this facility.    FRAX is not reported because this patient's T-score meets the World Health   Organization (WHO) criteria for osteoporosis and this patient is currently   taking medication for abnormal bone density.    The scan details [facility].    Radha Navarro M.D.    Breast/Diagnostic Radiologist  [facility]  [email or web address]  SJA:collin  R:8/4/2017 T:8/4/2017   Result Narrative   DIAGNOSTIC DXA BONE MINERAL DENSITY, 8/4/2017    CLINICAL HISTORY:  This is a 67-year-old female patient.      RISK FACTORS:  The patient has estrogen deficiency.  The patient has a   history of osteoporosis based on a prior BMD study.  The patient has a   body weight under 127 pounds.  The patient has the following medical   condition(s):  Vitamin D deficiency.  The patient has taken for at least   one month the following medications in the last year:  Medications for   osteoporosis.    TECHNIQUE:  Dual-energy x-ray absorptiometry system (axial skeleton).  The   patient was scanned on a GE Lunar Prodigy scanner, fast-array scan mode.    The study was technically adequate.      FINDINGS:       BMD T-Score Z-Score   Lumbar Spine (L1 to L4) 0.896 g/cm2 -2.4 -0.2   Right Hip Femoral Neck 0.758 g/cm2 -2.0 -0.1   Right Total Hip (BMD for comparison to prior) 0.773 g/cm2 -1.9 -0.1   Left Hip Femoral Neck 0.688 g/cm2 -2.5 -0.6   Left Total Hip (BMD for comparison to prior) 0.757 g/cm2 -2.0 -0.2     6/17/2015    DIAGNOSTIC DXA BONE MINERAL DENSITY, 6/17/2015    CLINICAL HISTORY:  This is a 64-year-old female patient.      RISK FACTORS:  The patient has  estrogen deficiency.  The patient has a   history of low bone density based on a prior BMD study (osteopenia).  The   patient has a body weight under 127 pounds.      TECHNIQUE:  Dual-energy x-ray absorptiometry system (axial skeleton).  The   patient was scanned on a GE Lunar Prodigy scanner, fast-array scan mode.    The study was technically adequate.      FINDINGS:       BMD T-Score Z-Score   Lumbar Spine (L1 to L4) 0.861 g/cm2 -2.7 -0.6   Right Hip Femoral Neck 0.748 g/cm2 -2.1 -0.3   Right Total Hip   0.739 g/cm2 -2.1 -0.6   Left Hip Femoral Neck 0.722 g/cm2 -2.3 -0.5   Left Total Hip   0.808 g/cm2 -1.6 0.0

## 2022-02-25 NOTE — LETTER
2/25/2022         RE: Bree Carrillo  [address]  [place] [address]        Dear Colleague,    Thank you for referring your patient, Bree Carrillo, to the [facility]. Please see a copy of my visit note below.                                                                               - Endocrinology Follow up -    Reason for visit/consult:  Osteoporosis    Primary care provider: [facility]      Assessment and Plan  70 year old female with osteoporosis    # Osteoporosis  Compared to 2015, 2017 bone density showed lumbar slight improvement from a -2.7 up to -2.4.  However left hip decreased bone density, which she has had pain recently.  Prolia seems working, we will modify calcium supplement and continue Prolia for now.       - switch from Prolia to Reclast this year (she was paying $3000 per each injection with Prolia)    - Bone density spring 2022    - continue Ca supplement 2 tabs daily      #Subclinical hypothyroidism   Was on LT4 50 mcg and held since 10/2021    - TSH, free T4 recheck whether we need to resume or ok not to have    35 minutes spent on the date of the encounter doing chart review, history and exam, documentation and further activities as noted above.    Deanna Lundberg MD  Staff Physician  Endocrinology and Metabolism  [facility]  License: [id number]  Pager: [phone]    Interval History as of 2/25/2022 : Patient has been doing well. Last seen 1 year ago . Medication compliance good for Ca supplement . New event includes: no pertinent medical event noted, but mentioned Prolia cost $3000 out of pocket each time  .  Interval History as of 2/12/2021 : Patient has been doing well. Last year skipped prolia since COVID. No fractures. Taking citracal petite 2 tab but started to have bothering GI and stopped.   HPI: A 68 female her for osteoporosis. Osteoporosis diagnosed in 2015 with T score -2.7 lumber, by bone  density. Last few month left pain hip and hands. Last 2 years she has been on prolia, so far received 4 times. Last dose of Prolia was last winter. Calcium carbnate prescribed but could not swallow because of the size.   Currently switched to Calcium carbonate gummies OTC unknown dose (4 tab).  She has never tried Fosamax.     Prior fragility fracture: no  Parental history of hip fracture: no  Rheumatoid arthritis: no  Secondary cause of osteoporosis: no  Body habitus (BMI less than or equal to 20): no  Family history of osteoporosis: no  Sedentary lifestyle: no  Current tabacco smoking: no  History of thyroid disorder: no  Calcuim and Vitmin D supplements: calicum carbonate gummies  Other supplement or bone treatment: Prolia   Medication use (PPI, epileptic medications etc): no  Early menopause (female): around 47.     Past Medical/Surgical History:  Past Medical History:   Diagnosis Date     Hypertension      Past Surgical History:   Procedure Laterality Date     LASIK BILATERAL Bilateral 2012    Dr Chavez       Allergies:  Allergies   Allergen Reactions     Iohexol Hives     Pt experienced sneeze, itching followed by hives post 100cc Omni 350 for CT Scan.   Observed with IV in place x 45 minutes.   Given 25mg PO Benadryl with OK for 50mg to be taken every 8 hours for the next 24 hours per Dr Rausch.     JRS       Current Medications   Current Outpatient Medications   Medication     aspirin (ASA) 81 MG EC tablet     atorvastatin (LIPITOR) 20 MG tablet     Calcium Citrate-Vitamin D (CITRACAL + D) 250-200 MG-UNIT TABS     Ferrous Sulfate (IRON PO)     losartan-hydrochlorothiazide (HYZAAR) 100-25 MG tablet     Multiple Vitamins-Minerals (ALIVE WOMENS GUMMY PO)     levothyroxine (SYNTHROID/LEVOTHROID) 50 MCG tablet     lovastatin (MEVACOR) 20 MG tablet     mometasone (ELOCON) 0.1 % solution (lotion)     terbinafine (LAMISIL) 250 MG tablet     No current facility-administered medications for this visit.       Family  History:  Family History   Problem Relation Age of Onset     No Known Problems Mother      No Known Problems Father      No Known Problems Maternal Grandmother      No Known Problems Maternal Grandfather      No Known Problems Paternal Grandmother      No Known Problems Paternal Grandfather      Coronary Artery Disease Brother      No Known Problems Sister      No Known Problems Son      No Known Problems Daughter      No Known Problems Maternal Half-Brother      No Known Problems Maternal Half-Sister      No Known Problems Paternal Half-Brother      No Known Problems Paternal Half-Sister      No Known Problems Niece      No Known Problems Nephew      No Known Problems Cousin      No Known Problems Other      Diabetes No family hx of      Hypertension No family hx of      Hyperlipidemia No family hx of      Cerebrovascular Disease No family hx of      Breast Cancer No family hx of      Colon Cancer No family hx of      Prostate Cancer No family hx of      Other Cancer No family hx of      Depression No family hx of      Anxiety Disorder No family hx of      Mental Illness No family hx of      Substance Abuse No family hx of      Anesthesia Reaction No family hx of      Asthma No family hx of      Osteoporosis No family hx of      Genetic Disorder No family hx of      Thyroid Disease No family hx of      Obesity No family hx of      Unknown/Adopted No family hx of      Glaucoma No family hx of      Macular Degeneration No family hx of        Social History:  Social History     Tobacco Use     Smoking status: Never Smoker     Smokeless tobacco: Never Used   Substance Use Topics     Alcohol use: No   lives with , adult kids (37, 35). 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     ROS:  Full review of systems taken with the help of the intake sheet. Otherwise a complete 14 point review of systems was taken and is negative unless stated in the history above.    Physical Exam:   Blood pressure 125/77, pulse 77, weight  51.5 kg (113 lb 8 oz), SpO2 100 %, not currently breastfeeding.    General: well appearing, no acute distress, pleasant and conversant,   Mental Status/neuro: alert and oriented  Face: symmetrical, normal facial color  Eyes: anicteric, PERRL, no proptosis or lid lag  Neck: suppler, no lymphadenopahty  Thyroid: normal size and texture, no nodule palpable, no bruits  Back: No scoliosis no kyphosis  Heart: regular rhythm, S1S2, no murmur appreciated  Lung: clear to auscultation bilaterally  Abdomen: soft, NT/ND, no hepatomegaly  Legs: no swelling or edema      Labs : I reviewed data from epic and extract and summarize the pertinent data here.   Lab Results   Component Value Date     07/23/2018      Lab Results   Component Value Date    POTASSIUM 4.0 07/23/2018     Lab Results   Component Value Date    CHLORIDE 107 07/23/2018     Lab Results   Component Value Date    DEVAN 8.3 07/23/2018     Lab Results   Component Value Date    CO2 29 07/23/2018     Lab Results   Component Value Date    BUN 20 07/23/2018     Lab Results   Component Value Date    CR 0.70 07/23/2018     Lab Results   Component Value Date    GLC 90 07/23/2018     Lab Results   Component Value Date    TSH 2.35 07/23/2018     Lab Results   Component Value Date    T4 0.96 06/28/2018       Bone Denisty: 8/4/2017: I also personally reviewed the original images and explained to the patient .      1.  This patient's T-score meets the World Health Organization (WHO)   criteria for osteoporosis at one or more measured sites (T-score -2.5 or   below).  The risk of osteoporotic fracture increases approximately   two-fold for each 1.0 SD decrease in T-score.    2.  Moderate levoscoliosis of lumbar spine.       COMPARISON:  Comparison with previous study dated 06/17/2015 shows:    There was a statistically significant increase in the bone mineral density   in the spine.    There was no statistically significant change in the bone mineral density   in the right  hip.    There was a statistically significant decrease in the bone mineral density   in the left hip.      Comparison with baseline study dated 09/05/2013 shows:    There was a statistically significant increase in the bone mineral density   in the spine.    There was no statistically significant change in the bone mineral density   in the right hip.    There was a statistically significant decrease in the bone mineral density   in the left hip.      Statistical significance is determined by the least significant change   (LSC) for the scanner and operators at this facility.    FRAX is not reported because this patient's T-score meets the World Health   Organization (WHO) criteria for osteoporosis and this patient is currently   taking medication for abnormal bone density.    The scan details [facility].    Radha Navarro M.D.    Breast/Diagnostic Radiologist  [facility]  [email or web address]  SJA:collin  R:8/4/2017 T:8/4/2017   Result Narrative   DIAGNOSTIC DXA BONE MINERAL DENSITY, 8/4/2017    CLINICAL HISTORY:  This is a 67-year-old female patient.      RISK FACTORS:  The patient has estrogen deficiency.  The patient has a   history of osteoporosis based on a prior BMD study.  The patient has a   body weight under 127 pounds.  The patient has the following medical   condition(s):  Vitamin D deficiency.  The patient has taken for at least   one month the following medications in the last year:  Medications for   osteoporosis.    TECHNIQUE:  Dual-energy x-ray absorptiometry system (axial skeleton).  The   patient was scanned on a GE Lunar Prodigy scanner, fast-array scan mode.    The study was technically adequate.      FINDINGS:       BMD T-Score Z-Score   Lumbar Spine (L1 to L4) 0.896 g/cm2 -2.4 -0.2   Right Hip Femoral Neck 0.758 g/cm2 -2.0 -0.1   Right Total Hip (BMD for comparison to prior) 0.773 g/cm2 -1.9 -0.1   Left Hip Femoral Neck 0.688  g/cm2 -2.5 -0.6   Left Total Hip (BMD for comparison to prior) 0.757 g/cm2 -2.0 -0.2     6/17/2015    DIAGNOSTIC DXA BONE MINERAL DENSITY, 6/17/2015    CLINICAL HISTORY:  This is a 64-year-old female patient.      RISK FACTORS:  The patient has estrogen deficiency.  The patient has a   history of low bone density based on a prior BMD study (osteopenia).  The   patient has a body weight under 127 pounds.      TECHNIQUE:  Dual-energy x-ray absorptiometry system (axial skeleton).  The   patient was scanned on a GE Lunar Prodigy scanner, fast-array scan mode.    The study was technically adequate.      FINDINGS:       BMD T-Score Z-Score   Lumbar Spine (L1 to L4) 0.861 g/cm2 -2.7 -0.6   Right Hip Femoral Neck 0.748 g/cm2 -2.1 -0.3   Right Total Hip   0.739 g/cm2 -2.1 -0.6   Left Hip Femoral Neck 0.722 g/cm2 -2.3 -0.5   Left Total Hip   0.808 g/cm2 -1.6 0.0         Again, thank you for allowing me to participate in the care of your patient.        Sincerely,        Deanna Lundberg MD

## 2022-02-25 NOTE — NURSING NOTE
Bree Carrillo's goals for this visit include:   Chief Complaint   Patient presents with     Osteoporosis     She requests these members of her care team be copied on today's visit information: Yes    PCP: Tobias Carlos    Referring Provider:  Reina Corley APRN CNP  [address] [place] [address]    /77 (BP Location: Left arm, Patient Position: Sitting, Cuff Size: Adult Regular)   Pulse 77   Wt 51.5 kg (113 lb 8 oz)   SpO2 100%   BMI 26.38 kg/m      Do you need any medication refills at today's visit? No

## 2022-02-25 NOTE — PATIENT INSTRUCTIONS
GENERAL INFORMATION FOR PATIENT TAKING RECLAST     Kidney function and calcium should be monitored within 2 weeks of infusion.     Be sure you are well hydrated, drink plenty of water. Drink at least 2 glasses of water in the morning before the infusion, unless otherwise instructed by your doctor.     It is important that you continue to take adequate calcium and vitamin D prior to, and following, Reclast infusion. Typical calcium doses are in the range of 1000 - 1500 mg/day (in divided doses) and vitamin D dose ranges are 400 - 1200 IU/day.     The most common side effects, which can occur from 3-14 days after an infusion include fever, muscle aches, flu - like symptoms, headache, joint aches, bone pin. Treatment with acetaminophen can reduce these symptoms.     Rare, but potentially serious, side effects include jaw problems (osteonecrosis) and kidney damage.     You should not take Reclast if you have the following:   Low calcium   Pregnancy or breat feeding   Significantly reduced kidney function (creatinine clearance < 35 ml/minute)   Recent or upcoming dental procedures     You should not take NSAID (non - steroidal anti - inflammatory) medications (such as Ibuprofen) within 2 weeks before or after Reclast.     Reclast is administered in the Infusion Center.  Appointment for the first infusion is set up through my clinic ( [phone] to schedule). The infusion of Reclast is give over a period of 30 minutes.     ON THE DAY OF INFUSION:     Take your usual dose of calcium and vitamin D.   Drink at least 2 glasses of water before the infusion.   You may take acetaminophen as needed for symptoms after Reclast.                  If you are taking Calcium (including Tums), Multivitamin, or Vitamin D; Please discontinue 1 day prior and day of DEXA Scan. It is ok to resume these medications after the scan is complete unless indicated by your doctor. Please check in at the West Entrance. Desk B.    Please call  [phone] to schedule DEXA at [facility].

## 2022-02-26 LAB — DEPRECATED CALCIDIOL+CALCIFEROL SERPL-MC: 42 UG/L (ref 20–75)

## 2022-03-07 ENCOUNTER — TELEPHONE (OUTPATIENT)
Dept: ENDOCRINOLOGY | Facility: CLINIC | Age: 72
End: 2022-03-07
Payer: COMMERCIAL

## 2022-03-07 NOTE — TELEPHONE ENCOUNTER
[facility] Call Center    Phone Message    May a detailed message be left on voicemail: yes     Reason for Call: Other: PT would like a call back to know if she should or should not take her medications or eat before appt on 5/18/21 for reclast.  Please call back and leave a VM  if no answer.      Action Taken: Message routed to:  Adult Clinics: Endocrinology p [phone]    Travel Screening: Not Applicable

## 2022-03-08 NOTE — TELEPHONE ENCOUNTER
Writer will review with Dr. Lundberg.       Yoselin Meza, RN  Endocrine Care Coordinator  [facility]

## 2022-03-14 NOTE — TELEPHONE ENCOUNTER
Patient contacted and informed.    Luba Montes [facility]  Adult Endocrinology  [facility]

## 2022-03-29 ENCOUNTER — TELEPHONE (OUTPATIENT)
Dept: FAMILY MEDICINE | Facility: CLINIC | Age: 72
End: 2022-03-29
Payer: COMMERCIAL

## 2022-03-29 NOTE — TELEPHONE ENCOUNTER
"Spoke to patient.  She states she just wants to know \"because I'm old\".  She denies any symptoms, it was recommended by a family member who is a MD.  She has not had one before.    Routing to Dr. Carlos to advise if able to place the order.    Fatou Haji RN, [facility]    "

## 2022-03-29 NOTE — TELEPHONE ENCOUNTER
Please inform patient  I cannot order EKG without associated diagnosis  Patient has brought this up during her wellness exam with me, I had also explained to her at that time  She can follow-up in the office with one of us  if she needs this to be done.  If she is willing, you can help her schedule on one of the same-day spot  I did see a normal EKG from 2014 through [facility]

## 2022-03-29 NOTE — TELEPHONE ENCOUNTER
Patient called: She would like to have an EKG and is requesting an order. I scheduled her on 4/14/22.  Please advise if you are able to place an order and if she should hold her medication before this appointment.

## 2022-03-29 NOTE — PROGRESS NOTES
Nurse Note      Itinerary:  [facility]      Departure Date: 4/24/22      Return Date: 5/29/22      Length of Trip a month      Reason for Travel: Tourism           Urban or rural: both      Accommodations: Family home        IMMUNIZATION HISTORY  Have you received any immunizations within the past 4 weeks?  No  Have you ever fainted from having your blood drawn or from an injection?  No  Have you ever had a fever reaction to vaccination?  No  Have you ever had any bad reaction or side effect from any vaccination?  No  Have you ever had hepatitis A or B vaccine?  No  Do you live (or work closely) with anyone who has AIDS, an AIDS-like condition, any other immune disorder or who is on chemotherapy for cancer?  No  Do you have a family history of immunodeficiency?  No  Have you received any injection of immune globulin or any blood products during the past 12 months?  No    Patient roomed by MARTI Carney  Bree Carrillo is a 71 year old female seen today with spouse for counsultation for international travel.   Patient will be departing in  3.5 week(s) and  traveling with spouse.      Patient itinerary :  will be in the urban region of [facility]  Driving to [place] [place] [facility] which risk for Malaria, Yellow Fever, food borne illnesses, motor vehicle accidents, Typhoid and Covid. exposure.      Patient's activities will include visiting a family member.    Patient's country of birth is [place]     Special medical concerns: advanced age , Osteoporosis   Pre-travel questionnaire was completed by patient and reviewed by provider.     Vitals: /76   Pulse 99   Temp 97.4  F (36.3  C) (Tympanic)   Wt 52 kg (114 lb 9.6 oz)   SpO2 100%   BMI 26.64 kg/m    BMI= Body mass index is 26.64 kg/m .    EXAM:  General:  Well-nourished, well-developed in no acute distress.  Appears to be stated age, interacts appropriately and expresses understanding of information given to patient.    Current Outpatient Medications    Medication Sig Dispense Refill     aspirin (ASA) 81 MG EC tablet Take 1 tablet (81 mg) by mouth daily 90 tablet 3     atorvastatin (LIPITOR) 20 MG tablet Take 1 tablet (20 mg) by mouth every evening New RX replacing mevacor 90 tablet 3     Calcium Citrate-Vitamin D (CITRACAL + D) 250-200 MG-UNIT TABS Citracal Calcium 400mg 2 tabs(800mg) BID (Patient taking differently: Citracal Calcium 400mg 1  tabs(800mg) BID)       Ferrous Sulfate (IRON PO) Take 1 tablet by mouth 2 times daily       levothyroxine (SYNTHROID/LEVOTHROID) 50 MCG tablet Take 1 tablet (50 mcg) by mouth daily (Patient not taking: Reported on 2/25/2022) 90 tablet 3     losartan-hydrochlorothiazide (HYZAAR) 100-25 MG tablet Take 1 tablet by mouth every morning This is replacing the losartan 90 tablet 1     lovastatin (MEVACOR) 20 MG tablet Take 1 tablet (20 mg) by mouth At Bedtime (Patient not taking: Reported on 2/25/2022) 90 tablet 3     mometasone (ELOCON) 0.1 % solution (lotion)  (Patient not taking: Reported on 2/25/2022)       Multiple Vitamins-Minerals (ALIVE WOMENS GUMMY PO)        terbinafine (LAMISIL) 250 MG tablet Take 1 tablet (250 mg) by mouth daily (Patient not taking: Reported on 2/25/2022) 30 tablet 2     Patient Active Problem List   Diagnosis     Anemia     Closed right cuboid fracture     Falls     GERD (gastroesophageal reflux disease)     GI bleed     Hyperlipidemia with target LDL less than 130     Benign essential hypertension     Iron deficiency anemia     Osteoporosis     Right foot pain     Vitamin D deficiency     Senile osteoporosis     Adverse effect of other drugs, medicaments and biological substances, sequela     Onychomycosis     Allergies   Allergen Reactions     Iohexol Hives     Pt experienced sneeze, itching followed by hives post 100cc Omni 350 for CT Scan.   Observed with IV in place x 45 minutes.   Given 25mg PO Benadryl with OK for 50mg to be taken every 8 hours for the next 24 hours per Dr Rausch.     ABDOULAYE          Immunizations discussed include:   Covid 19: Up to date  Hepatitis A:  Ordered/given today, risks, benefits and side effects reviewed  Hepatitis B: Titers drawn  Influenza: Up to date  Typhoid: Ordered/given today, risks, benefits and side effects reviewed  Rabies: Declined  reviewed managment of a animal bite or scratch (washing wound, seek medical care within 24 hours for post exposure prophylaxis )  Yellow Fever: Declined : age related precaution. Is unsure if she has received vaccine earlier in her life. Waiver issued and Insect precautions reviewed  Azeri Encephalitis: Not indicated  Meningococcus: Declined  Cost  Not concerned about risk of disease  Tetanus/Diphtheria: will get vaccine at pharmacy , is due  Measles/Mumps/Rubella: Immune by disease history per patient report  Cholera: Not needed  Polio: Declined  Not concerned about risk of disease  Pneumococcal: Up to date  Varicella: Immune by disease history per patient report  Shingrix: Up to date  HPV:  Not indicated     TB: consider post exposure screen    ASSESSMENT/PLAN:  Bree was seen today for travel clinic.    Diagnoses and all orders for this visit:    Travel advice encounter  -     atovaquone-proguanil (MALARONE) 250-100 MG tablet; Take 1 tablet by mouth daily Start 2 days before exposure to Malaria and continue daily till  7 days after exposure.  -     azithromycin (ZITHROMAX) 500 MG tablet; Take 1 tablet (500 mg) by mouth daily for 3 doses Take 1 tablet a day for up to 3 days for severe diarrhea    Immunity status testing  -     Hepatitis B Surface Antibody; Future  -     Hepatitis B surface antigen; Future  -     Cancel: Rubeola Antibody IgG; Future  -     Cancel: Rubella Antibody IgG; Future  -     Cancel: Mumps Immune Status, IgG  -     Hepatitis B Surface Antibody  -     Hepatitis B surface antigen    Encounter for screening for other viral diseases   -     Hepatitis B Surface Antibody; Future  -     Hepatitis B Surface  Antibody    Other orders  -     HEPATITIS A VACCINE (ADULT)  -     TYPHOID VACCINE, IM  -     Cancel: MENINGOCOCCAL (MENACTRA )      I have reviewed general recommendations for safe travel   including: food/water precautions, insect precautions, roadway safety. Educational materials and [employer] report provided.    Malaraia prophylaxis recommended: Malarone  Symptomatic treatment for traveler's diarrhea: azithromycin    Personal protective measures reviewed including hand sanitizing and contact precautions for the prevention of viral illnesses. Cover coughs and masking  during travel and upon return.  Current COVID 19 pandemic.   Monitor / follow current CDC guidelines.    Country specific and CDC Covid 19  testing requirements and resources given to patient.      Evacuation insurance advised and resources were provided to patient.    Total visit time 30 minutes  with over 50% of time spent counseling patient as detailed above.    Shasha Schwartz CNP  Certificate in Travel Health

## 2022-03-29 NOTE — TELEPHONE ENCOUNTER
Called patient and relayed on information from provider. She states her understanding.    Sophy Donovan RN [employer]

## 2022-03-30 ENCOUNTER — OFFICE VISIT (OUTPATIENT)
Dept: FAMILY MEDICINE | Facility: CLINIC | Age: 72
End: 2022-03-30
Payer: COMMERCIAL

## 2022-03-30 VITALS
SYSTOLIC BLOOD PRESSURE: 131 MMHG | BODY MASS INDEX: 26.64 KG/M2 | WEIGHT: 114.6 LBS | HEART RATE: 99 BPM | TEMPERATURE: 97.4 F | DIASTOLIC BLOOD PRESSURE: 76 MMHG | OXYGEN SATURATION: 100 %

## 2022-03-30 DIAGNOSIS — Z11.59 ENCOUNTER FOR SCREENING FOR OTHER VIRAL DISEASES: ICD-10-CM

## 2022-03-30 DIAGNOSIS — Z01.84 IMMUNITY STATUS TESTING: ICD-10-CM

## 2022-03-30 DIAGNOSIS — Z71.84 TRAVEL ADVICE ENCOUNTER: Primary | ICD-10-CM

## 2022-03-30 PROCEDURE — 36415 COLL VENOUS BLD VENIPUNCTURE: CPT | Mod: GA | Performed by: NURSE PRACTITIONER

## 2022-03-30 PROCEDURE — 87340 HEPATITIS B SURFACE AG IA: CPT | Mod: GA | Performed by: NURSE PRACTITIONER

## 2022-03-30 PROCEDURE — 99402 PREV MED CNSL INDIV APPRX 30: CPT | Mod: 25 | Performed by: NURSE PRACTITIONER

## 2022-03-30 PROCEDURE — 90471 IMMUNIZATION ADMIN: CPT | Mod: GA | Performed by: NURSE PRACTITIONER

## 2022-03-30 PROCEDURE — 90691 TYPHOID VACCINE IM: CPT | Mod: GA | Performed by: NURSE PRACTITIONER

## 2022-03-30 PROCEDURE — 86706 HEP B SURFACE ANTIBODY: CPT | Mod: GA | Performed by: NURSE PRACTITIONER

## 2022-03-30 PROCEDURE — 90632 HEPA VACCINE ADULT IM: CPT | Mod: GA | Performed by: NURSE PRACTITIONER

## 2022-03-30 PROCEDURE — 90472 IMMUNIZATION ADMIN EACH ADD: CPT | Mod: GA | Performed by: NURSE PRACTITIONER

## 2022-03-30 RX ORDER — ATOVAQUONE AND PROGUANIL HYDROCHLORIDE 250; 100 MG/1; MG/1
1 TABLET, FILM COATED ORAL DAILY
Qty: 50 TABLET | Refills: 0 | Status: SHIPPED | OUTPATIENT
Start: 2022-03-30 | End: 2022-10-19

## 2022-03-30 RX ORDER — AZITHROMYCIN 500 MG/1
500 TABLET, FILM COATED ORAL DAILY
Qty: 3 TABLET | Refills: 0 | Status: SHIPPED | OUTPATIENT
Start: 2022-03-30 | End: 2022-04-02

## 2022-03-30 NOTE — LETTER
March 30, 2022      Name: Bree Carrillo  YOB: 1950  Dates of travel: 04/24/2022-05/28/2022  Countries of travel: [place] and [place]         Immunization Exemption Letter: Yellow Fever  The traveler named above is my patient and under my medical care and must be exempted from the requirement for yellow fever immunization. This exemption is valid only for the current trip (noted above).       Sincerely,          AYANNA George CNP  3/30/2022 1:23 PM

## 2022-03-30 NOTE — PATIENT INSTRUCTIONS
Thank you for visiting the [facility] : [phone]  Today March 30, 2022 you received the    Hepatitis A Vaccine - Please return on 9/26/22 or later for your 2nd and final dose.    Typhoid - injectable. This vaccine is valid for two years.             Get the Tdap ( Td )  At a pharmacy     Follow up vaccine appointments can be made as a NURSE ONLY visit at the Travel Clinic, (BE PREPARED TO WAIT, ) or at designated [facility].    If you are receiving the Rabies vaccines series, it is important that you follow the exact schedule ordered.     Pre-travel     We recommend that you purchase Medical Evacuation Insurance prior to your departure.  Https://wwwnc.cdc.gov/travel/page/insurance    [place] your travel plans with the [place] Department of State through STEP ( Smart Traveler Enrollment Program ) https://step.state.gov.  STEP is a free service to allow U[place] citizens and nationals traveling and living abroad to enroll their trip with the nearest [place] Embassy or Consulate.    Animal Exposure: Avoid all mammals even if they look healthy.  If there is a bite, scratch or even a lick, wash area immediately with soap and water for 15 minutes and seek medical care within 24 hours for evaluation of Rabies post exposure treatment.  Contact your Medical Evacuation Insurance.    COVID 19 (Sars Cov2) prevention strategies  Physical distancing: Maintain 6 foot (2m) from others.              Avoid large gatherings and public transportation.   Avoid indoor shopping malls, theaters and restaurants   Practice consistent mask wearing covering the nose, mouth and underneath the chin when unable to maintain 6 foot distance from others.  Hand washing: frequent, thorough handwashing with soap and water for 20 seconds (or using a hand  containing 60% alcohol)   Avoid touching face, nose, eyes, mouth unless you have done appropriate hand washing as above.   Clean high touch surfaces  with approved disinfectant against Covid 19  (70% Ethanol ) or a bleach solution (add 20 mL (4 teaspoons) of bleach to 1 L (1 quart) of water;)  Be careful not to breath or touch bleach.      Travel Covid 19 Testing:  updated 12/06/2021  International travelers: Pre-travel: diagnostic testing (antigen or PCR) may be required for entry:  See country specific Embassy websites or airline websites.    [place] ENTRY Requirements: Effective December 6, 2021, all international arrivals to the [place] (regardless of vaccination status or citizenship status) by air are required to have a negative predeparture COVID-19 result from a test taken no more than 1 calendar day prior to departure of the flight to the [place]. Many complex scenarios may result from the 1-day rule. For example, for a flight that arrives in the [place] on a Monday, the test must have been taken no earlier than Sunday local time in the departure city. If the itinerary contains multiple flights, the test can be taken 1 day prior to departure of the first flight or can be taken en route, as long as the connecting airport is not in the [place]. If the test is unable to be taken en route, the traveler will not be able to enter the [place], or if the test is taken en route and is positive, the traveler will have to isolate in that location. If the itinerary contains 1 or more overnight stays en route to the [place], the test must have been taken 1 calendar day before the flight that will enter the [place]; however, if the itinerary has an overnight connection due to limitations in flight availability, retesting will not be required. If the first flight within an itinerary is delayed due to severe weather, aircraft mechanical issues, or other issues outside of the traveler's control, the traveler will only need to be retested if the delay causes the original test to be 24 hours or more past the 1-day window. If a connecting flight is delayed due to any of the above issues, the traveler will only  need to be retested if the delay causes the original test to be 48 hours or more past the 1-day window. If a trip of less than 1 day is made out the [place], a viral test taken in the [place] may be used as a predeparture test as long as the test was taken no more than 1 day prior to rearrival in the [place]; however, if a delay occurs on the return trip and the predeparture test is out of the 1-day window, the traveler will need to be retested before returning to the [place]. Noncitizen nonimmigrants who are unvaccinated remain banned from entry    Post travel: CDC recommends getting tested 3-5 days after your trip AND stay home and self-quarantine for 7 days.      COVID-19 testing scheduling number for pre-travel through [facility]  [phone] (Must have an order). Available 24 hours a day.  You can also schedule through My Chart.     Post-travel illness:  Contact your provider or [facility] if you develop a fever, rash, cough, diarrhea or other symptoms for up to 1 year after travel.  Inform your healthcare provider when and where you traveled to.    Please call the [facility] with any questions [phone]  Or send your provider a 'My Chart' note.

## 2022-03-31 DIAGNOSIS — Z23 NEED FOR HEPATITIS B VACCINATION: Primary | ICD-10-CM

## 2022-03-31 LAB
HBV SURFACE AB SERPL IA-ACNC: 0.37 M[IU]/ML
HBV SURFACE AG SERPL QL IA: NONREACTIVE

## 2022-03-31 NOTE — RESULT ENCOUNTER NOTE
Your blood test shows that you do NOT have protection to Hepatitis B.  You also do not have the Hepatitis B virus.  If you wish to be vaccinated you can start the series at our clinic, your primary care or at most pharmacies. Possible exposure would be through contaminated blood or bodily fluids which is unlikely.   I hope you have a safe trip to [facility].     Shasha Schwartz (Lori) CNP  [facility]  Certificate in Travel Health

## 2022-04-01 ENCOUNTER — OFFICE VISIT (OUTPATIENT)
Dept: FAMILY MEDICINE | Facility: CLINIC | Age: 72
End: 2022-04-01
Payer: COMMERCIAL

## 2022-04-01 VITALS
DIASTOLIC BLOOD PRESSURE: 82 MMHG | TEMPERATURE: 98.7 F | SYSTOLIC BLOOD PRESSURE: 166 MMHG | HEIGHT: 55 IN | RESPIRATION RATE: 16 BRPM | WEIGHT: 113.5 LBS | HEART RATE: 82 BPM | OXYGEN SATURATION: 99 % | BODY MASS INDEX: 26.27 KG/M2

## 2022-04-01 DIAGNOSIS — L03.011 ACUTE PARONYCHIA OF RIGHT THUMB: Primary | ICD-10-CM

## 2022-04-01 PROCEDURE — 10160 PNXR ASPIR ABSC HMTMA BULLA: CPT | Mod: RT | Performed by: NURSE PRACTITIONER

## 2022-04-01 RX ORDER — SULFAMETHOXAZOLE/TRIMETHOPRIM 800-160 MG
1 TABLET ORAL 2 TIMES DAILY
Qty: 20 TABLET | Refills: 0 | Status: SHIPPED | OUTPATIENT
Start: 2022-04-01 | End: 2022-04-11

## 2022-04-01 RX ORDER — MUPIROCIN 20 MG/G
OINTMENT TOPICAL 3 TIMES DAILY
Qty: 15 G | Refills: 0 | Status: SHIPPED | OUTPATIENT
Start: 2022-04-01 | End: 2022-04-06

## 2022-04-01 ASSESSMENT — PAIN SCALES - GENERAL: PAINLEVEL: EXTREME PAIN (9)

## 2022-04-01 NOTE — PROGRESS NOTES
Assessment & Plan     Acute paronychia of right thumb  Counseled on self-care measures including: warm scrub and soaks with Hibiclens soap 2-3 times/day with mupirocin and dressing on after, OTC acetaminophen or ibuprofen for pain; and warning signs of when to seek urgent medical care including: worsening symptoms, fever, chills.  - sulfamethoxazole-trimethoprim (BACTRIM DS) 800-160 MG tablet; Take 1 tablet by mouth 2 times daily for 10 days  - mupirocin (BACTROBAN) 2 % external ointment; Apply topically 3 times daily for 5 days Apply topically to affected area  3 times daily  - DRAIN SKIN ABSCESS SIMPLE/SINGLE   Effected area cleaned with betadine x 3.  18 Gauge needle used to make a puncture incision.  Purlent drainage was removed. Appropraite wound care dressing applied.  Pt tolerated preocedure well. Wound care instruction reviewed.      See Patient Instructions    Return in about 1 week (around 4/8/2022), or if symptoms worsen or fail to improve.    AYANNA Wilhelm [facility] MARÍA ELENA Giron is a 71 year old who presents for the following health issues     HPI     Pain History:  When did you first notice your pain? 2-3 days now   Have you seen anyone else for your pain? No  Where in your body do you have pain? Musculoskeletal problem/pain    Description  Location: thumb - left  Joint Swelling: YES  Redness: YES  Pain: YES  Warmth: YES  Intensity:  severe  Progression of Symptoms:  worsening  Accompanying signs and symptoms:   Fevers: no  Numbness/tingling/weakness: no  History  Trauma to the area: no  Recent illness:  no  Previous similar problem: no  Previous evaluation:  no  Precipitating or alleviating factors:  Aggravating factors include: Use of the hand   Therapies tried and outcome: nothing    Review of Systems   Constitutional, HEENT, cardiovascular, pulmonary, GI, , musculoskeletal, neuro, skin, endocrine and psych systems are negative, except as  "otherwise noted.      Objective    BP (!) 166/82   Pulse 82   Temp 98.7  F (37.1  C) (Oral)   Resp 16   Ht 1.397 m (4' 7\")   Wt 51.5 kg (113 lb 8 oz)   SpO2 99%   BMI 26.38 kg/m    Body mass index is 26.38 kg/m .  Physical Exam   GENERAL: healthy, alert and no distress  SKIN: erythema - right thumb around nailbed with flutuance  PSYCH: mentation appears normal, affect normal/bright          "

## 2022-04-01 NOTE — PATIENT INSTRUCTIONS
Patient Education     Paronychia of the Finger or Toe  Paronychia is an infection near a fingernail or toenail. It usually occurs when an opening in the cuticle or an ingrown toenail lets bacteria under the skin.   The infection will need to be drained if pus is present. If the infection has been caught early, you may need only antibiotic treatment. Healing will take about 1 to 2 weeks.   Home care  Follow these guidelines when caring for yourself at home:     Clean and soak the toe or finger. Do this 2 times a day for the first 3 days. To do so:  ? Soak your foot or hand in a tub of warm water for 5 minutes. Or hold your toe or finger under a faucet of warm running water for 5 minutes.  ? Clean any crust away with soap and water using a cotton swab.  ? Put antibiotic ointment on the infected area.    Change the dressing daily or any time it gets dirty.    If you were given antibiotics, take them as directed until they are all gone.    If your infection is on a toe, wear comfortable shoes with a lot of toe room. You can also wear open-toed sandals while your toe heals.    You may use over-the-counter medicine (acetaminophen or ibuprofen) to help with pain, unless another medicine was prescribed. If you have chronic liver or kidney disease, talk with your healthcare provider before using these medicines. Also talk with your provider if you've had a stomach ulcer or gastrointestinal bleeding.  Prevention  The following can prevent paronychia:     Don't cut or play with your cuticles at home. A healthy cuticle maintains a seal between your skin and nail and keeps out infection.    Don't bite your nails.    Don't suck on your thumbs or fingers.  Follow-up care  Follow up with your healthcare provider, or as advised.   When to seek medical advice  Call your healthcare provider right away if any of these occur:     Redness, pain, or swelling of the finger or toe gets worse    You have trouble moving or bending the  finger or toe    Red streaks in the skin leading away from the wound    Pus or fluid draining from the nail area    Fever of 100.4 F (38 C) or higher, or as directed by your provider  Erik last reviewed this educational content on 7/1/2019 2000-2021 [employer]. All rights reserved. This information is not intended as a substitute for professional medical care. Always follow your healthcare professional's instructions.           Patient Education     Abscess (Incision & Drainage)  An abscess is sometimes called a boil. It happens when bacteria get trapped under the skin and start to grow. Pus forms inside the abscess as the body responds to the bacteria. An abscess can happen with an insect bite, ingrown hair, blocked oil gland, pimple, cyst, or puncture wound.   Your healthcare provider has drained the pus from your abscess. If the abscess pocket was large, your healthcare provider may have put in gauze packing. Your provider will need to remove or replace it on your next visit. . You may not need antibiotics to treat a simple abscess, unless the infection is spreading into the skin around the wound (cellulitis).   The wound will take about 1 to 2 weeks to heal, depending on the size of the abscess. Healthy tissue will grow from the bottom and sides of the opening until it seals over.   Home care  These tips can help your wound heal:    The wound may drain for the first 2 days. Cover the wound with a clean dry dressing. Change the dressing if it becomes soaked with blood or pus.    If a gauze packing was placed inside the abscess pocket, you may be told to remove it yourself. You may do this in the shower. Once the packing is removed, you should wash the area in the shower, or clean the area as directed by your provider. Continue to do this until the skin opening has closed. Make sure you wash your hands after changing the packing or cleaning the wound.    If you were prescribed antibiotics,  take them as directed until they are all gone.    You may use acetaminophen or ibuprofen to control pain, unless another pain medicine was prescribed. If you have liver disease or ever had a stomach ulcer, talk with your healthcare provider before using these medicines.  Follow-up care  Follow up with your healthcare provider, or as advised. If a gauze packing was put in your wound, it should be removed in 1 to 2 days. Check your wound every day for any signs that the infection is getting worse. The signs are listed below.   When to seek medical advice  Call your healthcare provider right away if any of these occur:    Increasing redness or swelling    Red streaks in the skin leading away from the wound    Increasing local pain or swelling    Continued pus draining from the wound 2 days after treatment    Fever of 100.4 F (38 C) or higher, or as directed by your healthcare provider    Boil returns when you are at home  Erik last reviewed this educational content on 8/1/2019 2000-2021 [employer]. All rights reserved. This information is not intended as a substitute for professional medical care. Always follow your healthcare professional's instructions.

## 2022-04-21 ENCOUNTER — INFUSION THERAPY VISIT (OUTPATIENT)
Dept: INFUSION THERAPY | Facility: CLINIC | Age: 72
End: 2022-04-21
Payer: COMMERCIAL

## 2022-04-21 ENCOUNTER — LAB (OUTPATIENT)
Dept: LAB | Facility: CLINIC | Age: 72
End: 2022-04-21

## 2022-04-21 ENCOUNTER — ANCILLARY PROCEDURE (OUTPATIENT)
Dept: BONE DENSITY | Facility: CLINIC | Age: 72
End: 2022-04-21
Attending: INTERNAL MEDICINE
Payer: COMMERCIAL

## 2022-04-21 VITALS
RESPIRATION RATE: 16 BRPM | DIASTOLIC BLOOD PRESSURE: 78 MMHG | BODY MASS INDEX: 26.66 KG/M2 | SYSTOLIC BLOOD PRESSURE: 120 MMHG | HEART RATE: 70 BPM | TEMPERATURE: 97.9 F | WEIGHT: 114.7 LBS | OXYGEN SATURATION: 99 %

## 2022-04-21 DIAGNOSIS — M81.0 SENILE OSTEOPOROSIS: Primary | ICD-10-CM

## 2022-04-21 DIAGNOSIS — M81.0 SENILE OSTEOPOROSIS: ICD-10-CM

## 2022-04-21 LAB
CALCIUM SERPL-MCNC: 9.5 MG/DL (ref 8.5–10.1)
CREAT SERPL-MCNC: 0.81 MG/DL (ref 0.52–1.04)
GFR SERPL CREATININE-BSD FRML MDRD: 77 ML/MIN/1.73M2

## 2022-04-21 PROCEDURE — 96365 THER/PROPH/DIAG IV INF INIT: CPT | Performed by: NURSE PRACTITIONER

## 2022-04-21 PROCEDURE — 82310 ASSAY OF CALCIUM: CPT | Performed by: INTERNAL MEDICINE

## 2022-04-21 PROCEDURE — 77081 DXA BONE DENSITY APPENDICULR: CPT | Mod: 59 | Performed by: RADIOLOGY

## 2022-04-21 PROCEDURE — 82565 ASSAY OF CREATININE: CPT | Performed by: INTERNAL MEDICINE

## 2022-04-21 PROCEDURE — 36415 COLL VENOUS BLD VENIPUNCTURE: CPT | Performed by: INTERNAL MEDICINE

## 2022-04-21 PROCEDURE — 99207 PR NO CHARGE LOS: CPT

## 2022-04-21 PROCEDURE — 77080 DXA BONE DENSITY AXIAL: CPT | Performed by: RADIOLOGY

## 2022-04-21 RX ORDER — EPINEPHRINE 1 MG/ML
0.3 INJECTION, SOLUTION, CONCENTRATE INTRAVENOUS EVERY 5 MIN PRN
Status: CANCELLED | OUTPATIENT
Start: 2022-04-21

## 2022-04-21 RX ORDER — HEPARIN SODIUM,PORCINE 10 UNIT/ML
5 VIAL (ML) INTRAVENOUS
Status: CANCELLED | OUTPATIENT
Start: 2022-04-21

## 2022-04-21 RX ORDER — MEPERIDINE HYDROCHLORIDE 25 MG/ML
25 INJECTION INTRAMUSCULAR; INTRAVENOUS; SUBCUTANEOUS EVERY 30 MIN PRN
Status: CANCELLED | OUTPATIENT
Start: 2022-04-21

## 2022-04-21 RX ORDER — NALOXONE HYDROCHLORIDE 0.4 MG/ML
0.2 INJECTION, SOLUTION INTRAMUSCULAR; INTRAVENOUS; SUBCUTANEOUS
Status: CANCELLED | OUTPATIENT
Start: 2022-04-21

## 2022-04-21 RX ORDER — HEPARIN SODIUM (PORCINE) LOCK FLUSH IV SOLN 100 UNIT/ML 100 UNIT/ML
5 SOLUTION INTRAVENOUS
Status: CANCELLED | OUTPATIENT
Start: 2022-04-21

## 2022-04-21 RX ORDER — METHYLPREDNISOLONE SODIUM SUCCINATE 125 MG/2ML
125 INJECTION, POWDER, LYOPHILIZED, FOR SOLUTION INTRAMUSCULAR; INTRAVENOUS
Status: CANCELLED
Start: 2022-04-21

## 2022-04-21 RX ORDER — ZOLEDRONIC ACID 5 MG/100ML
5 INJECTION, SOLUTION INTRAVENOUS ONCE
Status: CANCELLED
Start: 2022-04-21 | End: 2022-04-21

## 2022-04-21 RX ORDER — DIPHENHYDRAMINE HYDROCHLORIDE 50 MG/ML
50 INJECTION INTRAMUSCULAR; INTRAVENOUS
Status: CANCELLED
Start: 2022-04-21

## 2022-04-21 RX ORDER — EPINEPHRINE 1 MG/ML
0.3 INJECTION, SOLUTION INTRAMUSCULAR; SUBCUTANEOUS EVERY 5 MIN PRN
Status: CANCELLED | OUTPATIENT
Start: 2022-04-21

## 2022-04-21 RX ORDER — ZOLEDRONIC ACID 5 MG/100ML
5 INJECTION, SOLUTION INTRAVENOUS ONCE
Status: COMPLETED | OUTPATIENT
Start: 2022-04-21 | End: 2022-04-21

## 2022-04-21 RX ORDER — ALBUTEROL SULFATE 0.83 MG/ML
2.5 SOLUTION RESPIRATORY (INHALATION)
Status: CANCELLED | OUTPATIENT
Start: 2022-04-21

## 2022-04-21 RX ORDER — ALBUTEROL SULFATE 90 UG/1
1-2 AEROSOL, METERED RESPIRATORY (INHALATION)
Status: CANCELLED
Start: 2022-04-21

## 2022-04-21 RX ADMIN — ZOLEDRONIC ACID 5 MG: 0.05 INJECTION, SOLUTION INTRAVENOUS at 12:59

## 2022-04-21 RX ADMIN — Medication 250 ML: at 12:58

## 2022-04-21 ASSESSMENT — PAIN SCALES - GENERAL: PAINLEVEL: NO PAIN (0)

## 2022-04-21 NOTE — PROGRESS NOTES
Infusion Nursing Note:  Berefelipe Carrillo presents today for Reclast.    Patient seen by provider today: No   present during visit today: Not Applicable.    Note: N/A.    Intravenous Access:  Peripheral IV placed.    Treatment Conditions:  Lab Results   Component Value Date     10/27/2021    POTASSIUM 3.8 10/27/2021    CR 0.81 04/21/2022    DEVAN 9.5 04/21/2022    BILITOTAL 0.4 10/27/2021    ALBUMIN 3.8 10/27/2021    ALT 33 12/27/2021    AST 22 10/27/2021     Results reviewed, labs MET treatment parameters, ok to proceed with treatment.    Post Infusion Assessment:  Patient tolerated infusion without incident.  Blood return noted pre and post infusion.  Site patent and intact, free from redness, edema or discomfort.  No evidence of extravasations.  Access discontinued per protocol.     Discharge Plan:   Patient will return in 1 year for next appointment.   Patient discharged in stable condition accompanied by: self.  Departure Mode: Ambulatory.    Abiola Cristobal RN-BSN, PHN, OCN  [facility]

## 2022-07-22 ENCOUNTER — OFFICE VISIT (OUTPATIENT)
Dept: OPHTHALMOLOGY | Facility: CLINIC | Age: 72
End: 2022-07-22
Attending: OPHTHALMOLOGY
Payer: COMMERCIAL

## 2022-07-22 DIAGNOSIS — H25.813 COMBINED FORM OF AGE-RELATED CATARACT, BOTH EYES: ICD-10-CM

## 2022-07-22 DIAGNOSIS — Z98.890 STATUS POST BILATERAL LASIK SURGERY: ICD-10-CM

## 2022-07-22 DIAGNOSIS — H04.123 DRY EYES, BILATERAL: ICD-10-CM

## 2022-07-22 DIAGNOSIS — H35.033 HYPERTENSIVE RETINOPATHY OF BOTH EYES: ICD-10-CM

## 2022-07-22 DIAGNOSIS — H52.03 HYPEROPIA OF BOTH EYES: Primary | ICD-10-CM

## 2022-07-22 DIAGNOSIS — H52.4 PRESBYOPIA: ICD-10-CM

## 2022-07-22 DIAGNOSIS — H43.813 PVD (POSTERIOR VITREOUS DETACHMENT), BILATERAL: ICD-10-CM

## 2022-07-22 PROCEDURE — 92015 DETERMINE REFRACTIVE STATE: CPT

## 2022-07-22 PROCEDURE — 92014 COMPRE OPH EXAM EST PT 1/>: CPT | Performed by: OPHTHALMOLOGY

## 2022-07-22 PROCEDURE — G0463 HOSPITAL OUTPT CLINIC VISIT: HCPCS

## 2022-07-22 ASSESSMENT — REFRACTION_MANIFEST
OS_CYLINDER: SPHERE
OS_SPHERE: +1.00
OD_SPHERE: +1.00
OS_ADD: +2.75
OD_CYLINDER: +0.25
OD_AXIS: 025
OD_ADD: +2.75

## 2022-07-22 ASSESSMENT — VISUAL ACUITY
METHOD: SNELLEN - LINEAR
OS_PH_SC: 20/25
OS_SC+: -2
OD_PH_SC: 20/25
OS_SC: 20/50
OD_SC: 20/40

## 2022-07-22 ASSESSMENT — TONOMETRY
IOP_METHOD: TONOPEN
OS_IOP_MMHG: 20
OD_IOP_MMHG: 20

## 2022-07-22 ASSESSMENT — SLIT LAMP EXAM - LIDS
COMMENTS: NORMAL
COMMENTS: NORMAL

## 2022-07-22 ASSESSMENT — CONF VISUAL FIELD
OD_NORMAL: 1
METHOD: COUNTING FINGERS
OS_NORMAL: 1

## 2022-07-22 ASSESSMENT — EXTERNAL EXAM - LEFT EYE: OS_EXAM: NORMAL

## 2022-07-22 ASSESSMENT — CUP TO DISC RATIO
OS_RATIO: 0.3
OD_RATIO: 0.3

## 2022-07-22 ASSESSMENT — EXTERNAL EXAM - RIGHT EYE: OD_EXAM: NORMAL

## 2022-07-22 NOTE — NURSING NOTE
Chief Complaint(s) and History of Present Illness(es)     Annual Eye Exam     In both eyes (Annual eye exam).  Since onset it is stable.  Associated symptoms include Negative for double vision, dryness, eye pain and headache. Additional comments: Bree reports she was having some left eye pain but that has gone away now.  She reports no new changes in her vision.  She does not experience any blurry vision at this time.      ABIEL Boo 7/22/2022 9:48 AM

## 2022-08-22 ENCOUNTER — TELEPHONE (OUTPATIENT)
Dept: FAMILY MEDICINE | Facility: CLINIC | Age: 72
End: 2022-08-22

## 2022-08-22 NOTE — TELEPHONE ENCOUNTER
Patient is calling to see when she is due for annual exam and mammogram, informed she is due for exam in October and mammo in November.  She had no further questions.  Liliana Sherman RN  [facility]

## 2022-10-04 DIAGNOSIS — I10 BENIGN ESSENTIAL HYPERTENSION: ICD-10-CM

## 2022-10-05 RX ORDER — LOSARTAN POTASSIUM AND HYDROCHLOROTHIAZIDE 25; 100 MG/1; MG/1
TABLET ORAL
Qty: 90 TABLET | Refills: 0 | Status: SHIPPED | OUTPATIENT
Start: 2022-10-05 | End: 2022-12-13

## 2022-10-10 DIAGNOSIS — E78.5 HYPERLIPIDEMIA LDL GOAL <100: ICD-10-CM

## 2022-10-10 RX ORDER — ATORVASTATIN CALCIUM 20 MG/1
20 TABLET, FILM COATED ORAL EVERY EVENING
Qty: 90 TABLET | Refills: 0 | Status: SHIPPED | OUTPATIENT
Start: 2022-10-10 | End: 2022-12-13

## 2022-10-14 NOTE — TELEPHONE ENCOUNTER
Called patient - pt is scheduled for an appt with gisele on 10/19 - will schedule annual wellness and labs during that visit

## 2022-10-19 ENCOUNTER — OFFICE VISIT (OUTPATIENT)
Dept: FAMILY MEDICINE | Facility: CLINIC | Age: 72
End: 2022-10-19
Payer: COMMERCIAL

## 2022-10-19 VITALS
RESPIRATION RATE: 16 BRPM | BODY MASS INDEX: 26.08 KG/M2 | WEIGHT: 112.7 LBS | SYSTOLIC BLOOD PRESSURE: 127 MMHG | OXYGEN SATURATION: 99 % | TEMPERATURE: 97.5 F | DIASTOLIC BLOOD PRESSURE: 80 MMHG | HEART RATE: 78 BPM | HEIGHT: 55 IN

## 2022-10-19 DIAGNOSIS — Z86.2 HISTORY OF IRON DEFICIENCY ANEMIA: ICD-10-CM

## 2022-10-19 DIAGNOSIS — M81.0 SENILE OSTEOPOROSIS: ICD-10-CM

## 2022-10-19 DIAGNOSIS — Z12.31 VISIT FOR SCREENING MAMMOGRAM: ICD-10-CM

## 2022-10-19 DIAGNOSIS — I10 BENIGN ESSENTIAL HYPERTENSION: ICD-10-CM

## 2022-10-19 DIAGNOSIS — D17.30 LIPOMA OF SKIN AND SUBCUTANEOUS TISSUE: Primary | ICD-10-CM

## 2022-10-19 PROCEDURE — 99214 OFFICE O/P EST MOD 30 MIN: CPT | Performed by: FAMILY MEDICINE

## 2022-10-19 ASSESSMENT — PAIN SCALES - GENERAL: PAINLEVEL: NO PAIN (0)

## 2022-10-19 NOTE — PROGRESS NOTES
"  Assessment & Plan     Lipoma of skin and subcutaneous tissue  Reassured patient of the benign lipomatous swelling  Gave education handouts on the same  She does not need any surgical management at this time but patient will let us know if she wants to consult surgery in the future    Benign essential hypertension  BP Readings from Last 6 Encounters:   10/19/22 127/80   04/21/22 120/78   04/01/22 (!) 166/82   03/30/22 131/76   02/25/22 125/77   01/05/22 134/70     Initial blood pressure was 148/70, 152/82.  Rechecked-127/80  Is currently on Hyzaar, just got the refills few days ago  Patient will get fasting labs at her wellness exam in the next 1 to 2 months which she wants to call to schedule and stop scheduling now    Visit for screening mammogram    - MA SCREENING DIGITAL BILAT - Future  (s+30); Future    History of iron deficiency anemia  Patient is requesting labs for CBC  She will get those anyway at her next visit for physical in a month  Reviewed recent normal CBC from last year  Hemoglobin   Date Value Ref Range Status   10/27/2021 12.1 11.7 - 15.7 g/dL Final   02/21/2020 12.1 11.7 - 15.7 g/dL Final   08/09/2019 11.9 11.7 - 15.7 g/dL Final     Patient is currently not on any supplements at this time    Senile osteoporosis  Patient is reporting difficulty taking Caltrate D due to size of the pill, and having abdominal discomfort whenever she takes it  Recommended to start taking over-the-counter Tums twice a day and continue with vitamin D over-the-counter 2000 units daily  Patient verbalised understanding and is agreeable to the plan.      Review of the result(s) of each unique test - CBC, DEXA scan         BMI:   Estimated body mass index is 26.19 kg/m  as calculated from the following:    Height as of this encounter: 1.397 m (4' 7\").    Weight as of this encounter: 51.1 kg (112 lb 11.2 oz).       Chart documentation done in part with Dragon Voice recognition Software. Although reviewed after completion, " some word and grammatical error may remain.    See Patient Instructions    Return in about 1 month (around 11/19/2022), or if symptoms worsen or fail to improve, for medicare wellness exam, fasting labs.    Tobias Carlos MD  [facility] MARIBELL Giron is a 72 year old, presenting for the following health issues:  Lesion (On both legs and R shoulder)  Patient with past medical history significant for hypertension, hyperlipidemia is here to follow-up on her swelling that she has below the right shoulder and right elbow for the past 2+ years patient reports that she is here at the request of her family members to know more about the swelling and to determine further management   Patient denies concerns for pain, recent increase in the size of the swelling, tingling, numbness or weakness of the right upper extremity  Patient was seen by endocrinologist for osteoporosis last year, is currently on Reclast  Patient is reporting difficulty and upper abdominal discomfort after taking Caltrate D and is not taking it daily for the same reason.  Blood pressure was found to be slightly elevated today, patient states blood pressures at home randomly which are within the normal range, is currently on Hyzaar      History of Present Illness       Reason for visit:  Bumb on both legs and R shoulder 2 years    She eats 0-1 servings of fruits and vegetables daily.She consumes 3 sweetened beverage(s) daily.She exercises with enough effort to increase her heart rate 30 to 60 minutes per day.  She exercises with enough effort to increase her heart rate 3 or less days per week.   She is taking medications regularly.             Review of Systems   CONSTITUTIONAL: NEGATIVE for fever, chills, change in weight  INTEGUMENTARY/SKIN: as above  RESP: NEGATIVE for significant cough or SOB  CV: NEGATIVE for chest pain, palpitations or peripheral edema  CV: Hx HTN  MUSCULOSKELETAL: NEGATIVE for significant  "arthralgias or myalgia  NEURO: NEGATIVE for weakness, dizziness or paresthesias  PSYCHIATRIC: NEGATIVE for changes in mood or affect      Objective    /80   Pulse 78   Temp 97.5  F (36.4  C) (Temporal)   Resp 16   Ht 1.397 m (4' 7\")   Wt 51.1 kg (112 lb 11.2 oz)   SpO2 99%   BMI 26.19 kg/m    Body mass index is 26.19 kg/m .  Physical Exam   GENERAL: healthy, alert and no distress  RESP: lungs clear to auscultation - no rales, rhonchi or wheezes  CV: regular rate and rhythm, normal S1 S2, no S3 or S4, no murmur, click or rub, no peripheral edema and peripheral pulses strong  MS: no gross musculoskeletal defects noted, no edema  SKIN: About 3 to 4 cm nontender, mobile, subcutaneous lipomatous swelling with no overlying warmth, tenderness located in the, right ventral Elbow  , About 5 cm moderately defined, diffuse lipomatous swelling- below the right shoulder  PSYCH: mentation appears normal, affect normal/bright                    "

## 2022-12-13 ENCOUNTER — OFFICE VISIT (OUTPATIENT)
Dept: FAMILY MEDICINE | Facility: CLINIC | Age: 72
End: 2022-12-13
Payer: COMMERCIAL

## 2022-12-13 VITALS
HEART RATE: 80 BPM | RESPIRATION RATE: 14 BRPM | WEIGHT: 112.7 LBS | SYSTOLIC BLOOD PRESSURE: 124 MMHG | TEMPERATURE: 97.6 F | DIASTOLIC BLOOD PRESSURE: 75 MMHG | BODY MASS INDEX: 26.08 KG/M2 | HEIGHT: 55 IN | OXYGEN SATURATION: 100 %

## 2022-12-13 DIAGNOSIS — Z00.00 ENCOUNTER FOR MEDICARE ANNUAL WELLNESS EXAM: Primary | ICD-10-CM

## 2022-12-13 DIAGNOSIS — Z13.0 SCREENING FOR DISORDER OF BLOOD AND BLOOD-FORMING ORGANS: ICD-10-CM

## 2022-12-13 DIAGNOSIS — Z13.1 SCREENING FOR DIABETES MELLITUS (DM): ICD-10-CM

## 2022-12-13 DIAGNOSIS — I10 BENIGN ESSENTIAL HYPERTENSION: ICD-10-CM

## 2022-12-13 DIAGNOSIS — E78.5 HYPERLIPIDEMIA LDL GOAL <100: ICD-10-CM

## 2022-12-13 DIAGNOSIS — Z13.29 SCREENING FOR THYROID DISORDER: ICD-10-CM

## 2022-12-13 LAB
ALBUMIN SERPL-MCNC: 3.7 G/DL (ref 3.4–5)
ALP SERPL-CCNC: 77 U/L (ref 40–150)
ALT SERPL W P-5'-P-CCNC: 24 U/L (ref 0–50)
ANION GAP SERPL CALCULATED.3IONS-SCNC: 7 MMOL/L (ref 3–14)
AST SERPL W P-5'-P-CCNC: 19 U/L (ref 0–45)
BILIRUB SERPL-MCNC: 0.5 MG/DL (ref 0.2–1.3)
BUN SERPL-MCNC: 28 MG/DL (ref 7–30)
CALCIUM SERPL-MCNC: 9.2 MG/DL (ref 8.5–10.1)
CHLORIDE BLD-SCNC: 106 MMOL/L (ref 94–109)
CHOLEST SERPL-MCNC: 175 MG/DL
CO2 SERPL-SCNC: 26 MMOL/L (ref 20–32)
CREAT SERPL-MCNC: 0.93 MG/DL (ref 0.52–1.04)
CREAT UR-MCNC: 125 MG/DL
ERYTHROCYTE [DISTWIDTH] IN BLOOD BY AUTOMATED COUNT: 12.4 % (ref 10–15)
FASTING STATUS PATIENT QL REPORTED: YES
GFR SERPL CREATININE-BSD FRML MDRD: 65 ML/MIN/1.73M2
GLUCOSE BLD-MCNC: 103 MG/DL (ref 70–99)
HCT VFR BLD AUTO: 33.6 % (ref 35–47)
HDLC SERPL-MCNC: 91 MG/DL
HGB BLD-MCNC: 10.7 G/DL (ref 11.7–15.7)
LDLC SERPL CALC-MCNC: 70 MG/DL
MCH RBC QN AUTO: 29.1 PG (ref 26.5–33)
MCHC RBC AUTO-ENTMCNC: 31.8 G/DL (ref 31.5–36.5)
MCV RBC AUTO: 91 FL (ref 78–100)
MICROALBUMIN UR-MCNC: 6 MG/L
MICROALBUMIN/CREAT UR: 4.8 MG/G CR (ref 0–25)
NONHDLC SERPL-MCNC: 84 MG/DL
PLATELET # BLD AUTO: 260 10E3/UL (ref 150–450)
POTASSIUM BLD-SCNC: 4 MMOL/L (ref 3.4–5.3)
PROT SERPL-MCNC: 7.6 G/DL (ref 6.8–8.8)
RBC # BLD AUTO: 3.68 10E6/UL (ref 3.8–5.2)
SODIUM SERPL-SCNC: 139 MMOL/L (ref 133–144)
TRIGL SERPL-MCNC: 70 MG/DL
TSH SERPL DL<=0.005 MIU/L-ACNC: 3.2 MU/L (ref 0.4–4)
WBC # BLD AUTO: 4.7 10E3/UL (ref 4–11)

## 2022-12-13 PROCEDURE — 82043 UR ALBUMIN QUANTITATIVE: CPT | Performed by: NURSE PRACTITIONER

## 2022-12-13 PROCEDURE — 84443 ASSAY THYROID STIM HORMONE: CPT | Performed by: NURSE PRACTITIONER

## 2022-12-13 PROCEDURE — 99214 OFFICE O/P EST MOD 30 MIN: CPT | Mod: 25 | Performed by: NURSE PRACTITIONER

## 2022-12-13 PROCEDURE — 36415 COLL VENOUS BLD VENIPUNCTURE: CPT | Performed by: NURSE PRACTITIONER

## 2022-12-13 PROCEDURE — 85027 COMPLETE CBC AUTOMATED: CPT | Performed by: NURSE PRACTITIONER

## 2022-12-13 PROCEDURE — G0439 PPPS, SUBSEQ VISIT: HCPCS | Performed by: NURSE PRACTITIONER

## 2022-12-13 PROCEDURE — 80053 COMPREHEN METABOLIC PANEL: CPT | Performed by: NURSE PRACTITIONER

## 2022-12-13 PROCEDURE — 80061 LIPID PANEL: CPT | Performed by: NURSE PRACTITIONER

## 2022-12-13 RX ORDER — LOSARTAN POTASSIUM AND HYDROCHLOROTHIAZIDE 25; 100 MG/1; MG/1
1 TABLET ORAL EVERY MORNING
Qty: 90 TABLET | Refills: 3 | Status: SHIPPED | OUTPATIENT
Start: 2022-12-13 | End: 2024-01-30

## 2022-12-13 RX ORDER — ATORVASTATIN CALCIUM 20 MG/1
20 TABLET, FILM COATED ORAL EVERY EVENING
Qty: 90 TABLET | Refills: 3 | Status: SHIPPED | OUTPATIENT
Start: 2022-12-13 | End: 2024-01-30

## 2022-12-13 ASSESSMENT — ENCOUNTER SYMPTOMS
EYE PAIN: 0
NERVOUS/ANXIOUS: 0
FEVER: 0
ARTHRALGIAS: 1
NAUSEA: 0
BREAST MASS: 0
ABDOMINAL PAIN: 0
HEMATURIA: 0
JOINT SWELLING: 0
CHILLS: 0
WEAKNESS: 0
FREQUENCY: 0
HEMATOCHEZIA: 0
DIZZINESS: 0
DIARRHEA: 0
DYSURIA: 0
HEADACHES: 0
HEARTBURN: 0
SORE THROAT: 0
MYALGIAS: 0
PARESTHESIAS: 0
PALPITATIONS: 0
COUGH: 0
CONSTIPATION: 0
SHORTNESS OF BREATH: 0

## 2022-12-13 ASSESSMENT — ACTIVITIES OF DAILY LIVING (ADL): CURRENT_FUNCTION: NO ASSISTANCE NEEDED

## 2022-12-13 ASSESSMENT — PAIN SCALES - GENERAL: PAINLEVEL: NO PAIN (0)

## 2022-12-13 NOTE — PATIENT INSTRUCTIONS
PLAN:   1.   Symptomatic therapy suggested: Continue current medications as prescribed.   Try Voltaren gel for knee pain as needed  Try the gummy CAlcium and D supplment.  2.  Orders Placed This Encounter   Medications    atorvastatin (LIPITOR) 20 MG tablet     Sig: Take 1 tablet (20 mg) by mouth every evening     Dispense:  90 tablet     Refill:  3    losartan-hydrochlorothiazide (HYZAAR) 100-25 MG tablet     Sig: Take 1 tablet by mouth every morning     Dispense:  90 tablet     Refill:  3     Orders Placed This Encounter   Procedures    Albumin Random Urine Quantitative with Creat Ratio    CBC with platelets    Comprehensive metabolic panel    Lipid panel reflex to direct LDL Fasting    TSH with free T4 reflex       3. Patient needs to follow up in if no improvement,or sooner if worsening of symptoms or other symptoms develop.  Will follow up and/or notify patient of  results via My Chart to determine further need for followup  Follow up office visit in one year for annual health maintenance exam, sooner PRN.    Patient Education   Personalized Prevention Plan  You are due for the preventive services outlined below.  Your care team is available to assist you in scheduling these services.  If you have already completed any of these items, please share that information with your care team to update in your medical record.  Health Maintenance Due   Topic Date Due    Diptheria Tetanus Pertussis (DTAP/TDAP/TD) Vaccine (2 - Td or Tdap) 08/29/2022    Annual Wellness Visit  10/27/2022    Comprehensive Metabolic Panel  10/27/2022    Kidney Microalbumin Urine Test  10/27/2022    FALL RISK ASSESSMENT  10/27/2022    Complete Blood Count  10/27/2022    Mammogram  11/11/2022    Cholesterol Lab  12/27/2022

## 2022-12-13 NOTE — PROGRESS NOTES
"SUBJECTIVE:   Bree is a 72 year old who presents for Preventive Visit.    Patient has been advised of split billing requirements and indicates understanding: Yes  Are you in the first 12 months of your Medicare coverage?  No    Healthy Habits:     In general, how would you rate your overall health?  Fair    Frequency of exercise:  2-3 days/week    Duration of exercise:  30-45 minutes    Do you usually eat at least 4 servings of fruit and vegetables a day, include whole grains    & fiber and avoid regularly eating high fat or \"junk\" foods?  No    Taking medications regularly:  Yes    Ability to successfully perform activities of daily living:  No assistance needed    Home Safety:  No safety concerns identified    Hearing Impairment:  No hearing concerns    In the past 6 months, have you been bothered by leaking of urine?  No    In general, how would you rate your overall mental or emotional health?  Fair      PHQ-2 Total Score: 0    Additional concerns today:  No      Have you ever done Advance Care Planning? (For example, a Health Directive, POLST, or a discussion with a medical provider or your loved ones about your wishes): Yes, patient states has an Advance Care Planning document and will bring a copy to the clinic.       Fall risk  Fallen 2 or more times in the past year?: No  Any fall with injury in the past year?: No  click delete button to remove this line now  Cognitive Screening   1) Repeat 3 items (Leader, Season, Table)    2) Clock draw: NORMAL  3) 3 item recall: Recalls 3 objects  Results: 3 items recalled: COGNITIVE IMPAIRMENT LESS LIKELY    Mini-CogTM Copyright ROMINA Mckeon. Licensed by the author for use in [facility]; reprinted with permission (jamir@.[facility]). All rights reserved.      Do you have sleep apnea, excessive snoring or daytime drowsiness?: no    Reviewed and updated as needed this visit by clinical staff   Tobacco  Allergies  Meds  Problems           Lab work is in " process  Labs reviewed in EPIC  BP Readings from Last 3 Encounters:   12/13/22 124/75   10/19/22 127/80   04/21/22 120/78    Wt Readings from Last 3 Encounters:   12/13/22 51.1 kg (112 lb 11.2 oz)   10/19/22 51.1 kg (112 lb 11.2 oz)   04/21/22 52 kg (114 lb 11.2 oz)                 Allergies   Allergen Reactions     Iohexol Hives     Pt experienced sneeze, itching followed by hives post 100cc Omni 350 for CT Scan.   Observed with IV in place x 45 minutes.   Given 25mg PO Benadryl with OK for 50mg to be taken every 8 hours for the next 24 hours per Dr Rausch.     JRS       Reviewed and updated as needed this visit by Provider      Problems            Social History     Tobacco Use     Smoking status: Never     Smokeless tobacco: Never   Substance Use Topics     Alcohol use: No         Alcohol Use 12/13/2022   Prescreen: >3 drinks/day or >7 drinks/week? No   Prescreen: >3 drinks/day or >7 drinks/week? -         Current providers sharing in care for this patient include:   Patient Care Team:  Tobias Carlos MD as PCP - General (Family Medicine)  Deanna Lundberg MD as Assigned Endocrinology Provider  Cedrick Hawley DPM as Assigned Musculoskeletal Provider  Sindy Mccormack MD (Inactive) as Assigned Surgical Provider  Tobias Carlos MD as Assigned PCP    The following health maintenance items are reviewed in Epic and correct as of today:  Health Maintenance   Topic Date Due     MEDICARE ANNUAL WELLNESS VISIT  10/27/2022     MAMMO SCREENING  11/11/2022     DTAP/TDAP/TD IMMUNIZATION (2 - Td or Tdap) 12/11/2023 (Originally 8/29/2022)     EYE EXAM  07/22/2023     ANNUAL REVIEW OF HM ORDERS  10/19/2023     CMP  12/13/2023     LIPID  12/13/2023     MICROALBUMIN  12/13/2023     FALL RISK ASSESSMENT  12/13/2023     CBC  12/13/2023     COLORECTAL CANCER SCREENING  09/19/2024     ADVANCE CARE PLANNING  12/13/2027     DEXA  04/21/2037     HEPATITIS C SCREENING  Completed     PHQ-2 (once per calendar year)  Completed      INFLUENZA VACCINE  Completed     Pneumococcal Vaccine: 65+ Years  Completed     ZOSTER IMMUNIZATION  Completed     COVID-19 Vaccine  Completed     IPV IMMUNIZATION  Aged Out     MENINGITIS IMMUNIZATION  Aged Out     Lab work is in process  Labs reviewed in EPIC  BP Readings from Last 3 Encounters:   12/13/22 124/75   10/19/22 127/80   04/21/22 120/78    Wt Readings from Last 3 Encounters:   12/13/22 51.1 kg (112 lb 11.2 oz)   10/19/22 51.1 kg (112 lb 11.2 oz)   04/21/22 52 kg (114 lb 11.2 oz)                  Patient Active Problem List   Diagnosis     Anemia     Closed right cuboid fracture     Falls     GERD (gastroesophageal reflux disease)     GI bleed     Hyperlipidemia with target LDL less than 130     Benign essential hypertension     Iron deficiency anemia     Osteoporosis     Right foot pain     Vitamin D deficiency     Senile osteoporosis     Adverse effect of other drugs, medicaments and biological substances, sequela     Onychomycosis     Lipoma of skin and subcutaneous tissue     Past Surgical History:   Procedure Laterality Date     LASIK BILATERAL Bilateral 2012    Dr Chavez       Social History     Tobacco Use     Smoking status: Never     Smokeless tobacco: Never   Substance Use Topics     Alcohol use: No     Family History   Problem Relation Age of Onset     No Known Problems Mother      No Known Problems Father      No Known Problems Maternal Grandmother      No Known Problems Maternal Grandfather      No Known Problems Paternal Grandmother      No Known Problems Paternal Grandfather      Coronary Artery Disease Brother      No Known Problems Sister      No Known Problems Son      No Known Problems Daughter      No Known Problems Maternal Half-Brother      No Known Problems Maternal Half-Sister      No Known Problems Paternal Half-Brother      No Known Problems Paternal Half-Sister      No Known Problems Niece      No Known Problems Nephew      No Known Problems Cousin      No Known Problems  Other      Diabetes No family hx of      Hypertension No family hx of      Hyperlipidemia No family hx of      Cerebrovascular Disease No family hx of      Breast Cancer No family hx of      Colon Cancer No family hx of      Prostate Cancer No family hx of      Other Cancer No family hx of      Depression No family hx of      Anxiety Disorder No family hx of      Mental Illness No family hx of      Substance Abuse No family hx of      Anesthesia Reaction No family hx of      Asthma No family hx of      Osteoporosis No family hx of      Genetic Disorder No family hx of      Thyroid Disease No family hx of      Obesity No family hx of      Unknown/Adopted No family hx of      Glaucoma No family hx of      Macular Degeneration No family hx of          Current Outpatient Medications   Medication Sig Dispense Refill     aspirin (ASA) 81 MG EC tablet Take 1 tablet (81 mg) by mouth daily 90 tablet 3     atorvastatin (LIPITOR) 20 MG tablet Take 1 tablet (20 mg) by mouth every evening 90 tablet 3     Calcium Citrate-Vitamin D (CITRACAL + D) 250-200 MG-UNIT TABS Citracal Calcium 400mg 2 tabs(800mg) BID (Patient taking differently: Citracal Calcium 400mg 1  tabs(800mg) BID)       Cholecalciferol (VITAMIN D3) 1.25 MG (91415 UT) TABS        losartan-hydrochlorothiazide (HYZAAR) 100-25 MG tablet Take 1 tablet by mouth every morning 90 tablet 3     Allergies   Allergen Reactions     Iohexol Hives     Pt experienced sneeze, itching followed by hives post 100cc Omni 350 for CT Scan.   Observed with IV in place x 45 minutes.   Given 25mg PO Benadryl with OK for 50mg to be taken every 8 hours for the next 24 hours per Dr Rausch.     JRS     Pneumonia Vaccine:Up to date    Mammogram Screening: Mammogram Screening: Recommended mammography every 1-2 years with patient discussion and risk factor consideration    Review of Systems   Constitutional: Negative for chills and fever.   HENT: Negative for congestion, ear pain, hearing loss and  "sore throat.    Eyes: Negative for pain and visual disturbance.   Respiratory: Negative for cough and shortness of breath.    Cardiovascular: Negative for chest pain, palpitations and peripheral edema.   Gastrointestinal: Negative for abdominal pain, constipation, diarrhea, heartburn, hematochezia and nausea.   Breasts:  Negative for tenderness, breast mass and discharge.   Genitourinary: Negative for dysuria, frequency, genital sores, hematuria, pelvic pain, urgency, vaginal bleeding and vaginal discharge.   Musculoskeletal: Positive for arthralgias. Negative for joint swelling and myalgias.   Skin: Negative for rash.   Neurological: Negative for dizziness, weakness, headaches and paresthesias.   Psychiatric/Behavioral: Negative for mood changes. The patient is not nervous/anxious.        OBJECTIVE:   /75 (BP Location: Right arm, Patient Position: Sitting, Cuff Size: Adult Regular)   Pulse 80   Temp 97.6  F (36.4  C) (Oral)   Resp 14   Ht 1.392 m (4' 6.8\")   Wt 51.1 kg (112 lb 11.2 oz)   SpO2 100%   BMI 26.38 kg/m   Estimated body mass index is 26.38 kg/m  as calculated from the following:    Height as of this encounter: 1.392 m (4' 6.8\").    Weight as of this encounter: 51.1 kg (112 lb 11.2 oz).  Physical Exam  GENERAL APPEARANCE: healthy, alert and no distress  EYES: Eyes grossly normal to inspection and conjunctivae and sclerae normal  HENT: ear canals and TM's normal, nose and mouth without ulcers or lesions, oropharynx clear and oral mucous membranes moist  NECK: no adenopathy, no asymmetry, masses, or scars and thyroid normal to palpation  RESP: lungs clear to auscultation - no rales, rhonchi or wheezes  BREAST: normal without masses, tenderness or nipple discharge and no palpable axillary masses or adenopathy  CV: regular rates and rhythm, no murmur, click or rub, no peripheral edema and peripheral pulses strong  ABDOMEN: soft, nontender, no hepatosplenomegaly, no masses and bowel sounds " normal   (female): deferred  MS: no musculoskeletal defects are noted and gait is age appropriate without ataxia  SKIN: no suspicious lesions or rashes  NEURO: Normal strength and tone, sensory exam grossly normal, mentation intact and speech normal  PSYCH: mentation appears normal and affect normal/bright    Diagnostic Test Results:  Labs reviewed in Epic  Results for orders placed or performed in visit on 12/13/22   Albumin Random Urine Quantitative with Creat Ratio     Status: None   Result Value Ref Range    Creatinine Urine mg/dL 125 mg/dL    Albumin Urine mg/L 6 mg/L    Albumin Urine mg/g Cr 4.80 0.00 - 25.00 mg/g Cr   CBC with platelets     Status: Abnormal   Result Value Ref Range    WBC Count 4.7 4.0 - 11.0 10e3/uL    RBC Count 3.68 (L) 3.80 - 5.20 10e6/uL    Hemoglobin 10.7 (L) 11.7 - 15.7 g/dL    Hematocrit 33.6 (L) 35.0 - 47.0 %    MCV 91 78 - 100 fL    MCH 29.1 26.5 - 33.0 pg    MCHC 31.8 31.5 - 36.5 g/dL    RDW 12.4 10.0 - 15.0 %    Platelet Count 260 150 - 450 10e3/uL   Comprehensive metabolic panel     Status: Abnormal   Result Value Ref Range    Sodium 139 133 - 144 mmol/L    Potassium 4.0 3.4 - 5.3 mmol/L    Chloride 106 94 - 109 mmol/L    Carbon Dioxide (CO2) 26 20 - 32 mmol/L    Anion Gap 7 3 - 14 mmol/L    Urea Nitrogen 28 7 - 30 mg/dL    Creatinine 0.93 0.52 - 1.04 mg/dL    Calcium 9.2 8.5 - 10.1 mg/dL    Glucose 103 (H) 70 - 99 mg/dL    Alkaline Phosphatase 77 40 - 150 U/L    AST 19 0 - 45 U/L    ALT 24 0 - 50 U/L    Protein Total 7.6 6.8 - 8.8 g/dL    Albumin 3.7 3.4 - 5.0 g/dL    Bilirubin Total 0.5 0.2 - 1.3 mg/dL    GFR Estimate 65 >60 mL/min/1.73m2   Lipid panel reflex to direct LDL Fasting     Status: None   Result Value Ref Range    Cholesterol 175 <200 mg/dL    Triglycerides 70 <150 mg/dL    Direct Measure HDL 91 >=50 mg/dL    LDL Cholesterol Calculated 70 <=100 mg/dL    Non HDL Cholesterol 84 <130 mg/dL    Patient Fasting > 8hrs? Yes     Narrative    Cholesterol  Desirable:  <200  mg/dL    Triglycerides  Normal:  Less than 150 mg/dL  Borderline High:  150-199 mg/dL  High:  200-499 mg/dL  Very High:  Greater than or equal to 500 mg/dL    Direct Measure HDL  Female:  Greater than or equal to 50 mg/dL   Male:  Greater than or equal to 40 mg/dL    LDL Cholesterol  Desirable:  <100mg/dL  Above Desirable:  100-129 mg/dL   Borderline High:  130-159 mg/dL   High:  160-189 mg/dL   Very High:  >= 190 mg/dL    Non HDL Cholesterol  Desirable:  130 mg/dL  Above Desirable:  130-159 mg/dL  Borderline High:  160-189 mg/dL  High:  190-219 mg/dL  Very High:  Greater than or equal to 220 mg/dL   TSH with free T4 reflex     Status: Normal   Result Value Ref Range    TSH 3.20 0.40 - 4.00 mU/L       ASSESSMENT / PLAN:   Bree was seen today for wellness visit.    Diagnoses and all orders for this visit:    Encounter for Medicare annual wellness exam    Screening for diabetes mellitus (DM)  -     Comprehensive metabolic panel; Future  -     Comprehensive metabolic panel    Screening for disorder of blood and blood-forming organs  -     CBC with platelets; Future  -     CBC with platelets    Screening for thyroid disorder  -     TSH with free T4 reflex; Future  -     TSH with free T4 reflex    Benign essential hypertension  -     Albumin Random Urine Quantitative with Creat Ratio; Future  -     Comprehensive metabolic panel; Future  -     Albumin Random Urine Quantitative with Creat Ratio  -     Comprehensive metabolic panel    Hyperlipidemia LDL goal <100  -     Lipid panel reflex to direct LDL Fasting; Future    Other orders  -     Lipid panel reflex to direct LDL Fasting    PLAN:   1.   Symptomatic therapy suggested: Continue current medications as prescribed.   Try Voltaren gel for knee pain as needed  Try the gummy CAlcium and D supplment.  2. Patient needs to follow up in if no improvement,or sooner if worsening of symptoms or other symptoms develop.  Will follow up and/or notify patient of  results via My  Chart to determine further need for followup  Follow up office visit in one year for annual health maintenance exam, sooner PRN.    Patient has been advised of split billing requirements and indicates understanding: Yes      COUNSELING:  Reviewed preventive health counseling, as reflected in patient instructions  Special attention given to:       Regular exercise       Healthy diet/nutrition       Vision screening       Fall risk prevention       Osteoporosis prevention/bone health       Colon cancer screening       Hepatitis C screening       The 10-year ASCVD risk score (Yung SCHRADER, et al., 2019) is: 15.4%    Values used to calculate the score:      Age: 72 years      Sex: Female      Is Non- : No      Diabetic: No      Tobacco smoker: No      Systolic Blood Pressure: 124 mmHg      Is BP treated: Yes      HDL Cholesterol: 91 mg/dL      Total Cholesterol: 175 mg/dL       Advanced Planning         She reports that she has never smoked. She has never used smokeless tobacco.      Appropriate preventive services were discussed with this patient, including applicable screening as appropriate for cardiovascular disease, diabetes, osteopenia/osteoporosis, and glaucoma.  As appropriate for age/gender, discussed screening for colorectal cancer, prostate cancer, breast cancer, and cervical cancer. Checklist reviewing preventive services available has been given to the patient.    Reviewed patients plan of care and provided an AVS. The Intermediate Care Plan ( asthma action plan, low back pain action plan, and migraine action plan) for Bree meets the Care Plan requirement. This Care Plan has been established and reviewed with the Patient.          AYANNA Bowser [facility]    Identified Health Risks:

## 2022-12-14 NOTE — RESULT ENCOUNTER NOTE
Lalita Carrillo,    Attached are your test results.  -Cholesterol levels are at your goal levels.  ADVISE: continuing your medication, a regular exercise program with at least 150 minutes of aerobic exercise per week, and eating a low saturated fat/low carbohydrate diet.  Also, you should recheck this fasting cholesterol panel in 12 months.  -Liver and gallbladder tests (ALT,AST, Alk phos,bilirubin) are normal.  -Kidney function (GFR) is normal.  -Sodium is normal.  -Potassium is normal.  -Calcium is normal.  -Glucose is slight elevated and may be a sign of early diabetes (prediabetes). ADVISE:: eating a low carbohydrate diet, exercising, trying to lose weight (if necessary) and rechecking your glucose level in 12 months.  -TSH (thyroid stimulating hormone) level is normal which indicates normal thyroid function.  -Microalbumin (urine protein) test is normal.  ADVISE: rechecking this annually.   Please contact us if you have any questions.    Reina Corley, CNP

## 2022-12-23 ENCOUNTER — ANCILLARY PROCEDURE (OUTPATIENT)
Dept: MAMMOGRAPHY | Facility: CLINIC | Age: 72
End: 2022-12-23
Attending: FAMILY MEDICINE
Payer: COMMERCIAL

## 2022-12-23 DIAGNOSIS — Z12.31 VISIT FOR SCREENING MAMMOGRAM: ICD-10-CM

## 2022-12-23 PROCEDURE — 77067 SCR MAMMO BI INCL CAD: CPT | Mod: TC | Performed by: RADIOLOGY

## 2022-12-23 PROCEDURE — 77063 BREAST TOMOSYNTHESIS BI: CPT | Mod: TC | Performed by: RADIOLOGY

## 2022-12-27 NOTE — TELEPHONE ENCOUNTER
Patient wants her labs, mammogram and stool results mailed to her- done.  Dinorah Jasso RN   
yes...

## 2023-02-13 ENCOUNTER — OFFICE VISIT (OUTPATIENT)
Dept: FAMILY MEDICINE | Facility: CLINIC | Age: 73
End: 2023-02-13
Payer: COMMERCIAL

## 2023-02-13 VITALS
SYSTOLIC BLOOD PRESSURE: 157 MMHG | WEIGHT: 114.4 LBS | HEART RATE: 82 BPM | TEMPERATURE: 98.5 F | DIASTOLIC BLOOD PRESSURE: 87 MMHG | HEIGHT: 55 IN | OXYGEN SATURATION: 98 % | RESPIRATION RATE: 20 BRPM | BODY MASS INDEX: 26.47 KG/M2

## 2023-02-13 DIAGNOSIS — D17.30 LIPOMA OF SKIN AND SUBCUTANEOUS TISSUE: ICD-10-CM

## 2023-02-13 DIAGNOSIS — I10 BENIGN ESSENTIAL HYPERTENSION: ICD-10-CM

## 2023-02-13 DIAGNOSIS — D64.9 ANEMIA, UNSPECIFIED TYPE: Primary | ICD-10-CM

## 2023-02-13 DIAGNOSIS — R29.6 RECURRENT FALLS: ICD-10-CM

## 2023-02-13 LAB
ERYTHROCYTE [DISTWIDTH] IN BLOOD BY AUTOMATED COUNT: 12.8 % (ref 10–15)
FERRITIN SERPL-MCNC: 21 NG/ML (ref 8–252)
HCT VFR BLD AUTO: 34 % (ref 35–47)
HGB BLD-MCNC: 10.9 G/DL (ref 11.7–15.7)
MCH RBC QN AUTO: 29.1 PG (ref 26.5–33)
MCHC RBC AUTO-ENTMCNC: 32.1 G/DL (ref 31.5–36.5)
MCV RBC AUTO: 91 FL (ref 78–100)
PLATELET # BLD AUTO: 315 10E3/UL (ref 150–450)
RBC # BLD AUTO: 3.74 10E6/UL (ref 3.8–5.2)
WBC # BLD AUTO: 6.7 10E3/UL (ref 4–11)

## 2023-02-13 PROCEDURE — 99214 OFFICE O/P EST MOD 30 MIN: CPT | Performed by: FAMILY MEDICINE

## 2023-02-13 PROCEDURE — 83540 ASSAY OF IRON: CPT | Performed by: FAMILY MEDICINE

## 2023-02-13 PROCEDURE — 83550 IRON BINDING TEST: CPT | Performed by: FAMILY MEDICINE

## 2023-02-13 PROCEDURE — 82728 ASSAY OF FERRITIN: CPT | Performed by: FAMILY MEDICINE

## 2023-02-13 PROCEDURE — 36415 COLL VENOUS BLD VENIPUNCTURE: CPT | Performed by: FAMILY MEDICINE

## 2023-02-13 PROCEDURE — 85027 COMPLETE CBC AUTOMATED: CPT | Performed by: FAMILY MEDICINE

## 2023-02-13 ASSESSMENT — ENCOUNTER SYMPTOMS
FEVER: 0
SORE THROAT: 0
HEADACHES: 0
HEARTBURN: 0
DYSURIA: 0
DIARRHEA: 0
NAUSEA: 0
WEAKNESS: 0
DIZZINESS: 0
MYALGIAS: 0
FREQUENCY: 0
CONSTIPATION: 0
ARTHRALGIAS: 0
HEMATURIA: 0
BREAST MASS: 0
CHILLS: 0
JOINT SWELLING: 0
PALPITATIONS: 0
PARESTHESIAS: 0
SHORTNESS OF BREATH: 0
ABDOMINAL PAIN: 0
COUGH: 0
EYE PAIN: 0
NERVOUS/ANXIOUS: 0
HEMATOCHEZIA: 0

## 2023-02-13 ASSESSMENT — PAIN SCALES - GENERAL: PAINLEVEL: NO PAIN (0)

## 2023-02-13 ASSESSMENT — ACTIVITIES OF DAILY LIVING (ADL): CURRENT_FUNCTION: NO ASSISTANCE NEEDED

## 2023-02-13 NOTE — PATIENT INSTRUCTIONS
BP was elevated today.  Check BP at home every 1-2 days and send readings in 2 weeks to update us on what the BP is.  If elevated we should change the medication somewhat to control this.      Recheck hemoglobin and iron levels today    We can plan for PHYSICAL THERAPY if any recurrent falls are noted.

## 2023-02-13 NOTE — PROGRESS NOTES
ASSESSMENT / PLAN:   (D64.9) Anemia, unspecified type  (primary encounter diagnosis)  Comment: has been using iron supplement over last 2 months.   Plan: CBC with platelets, Ferritin        Persistent anemia in spite of using iron supplement.  Additional evaluation is recommended. Weight stable.  Consider GI workup.  At minimum stool testing for occult blood.    Wt Readings from Last 5 Encounters:   02/13/23 51.9 kg (114 lb 6.4 oz)   12/13/22 51.1 kg (112 lb 11.2 oz)   10/19/22 51.1 kg (112 lb 11.2 oz)   04/21/22 52 kg (114 lb 11.2 oz)   04/01/22 51.5 kg (113 lb 8 oz)         (I10) Benign essential hypertension  Comment: elevated BP on testing.   Plan: Basic metabolic panel  (Ca, Cl, CO2, Creat,         Gluc, K, Na, BUN)        Continue losartan-hydrochlorothiazide.  Monitor BP at home.  She will send BP readings in 2-3 weeks for assessment.     (D17.30) Lipoma of skin and subcutaneous tissue  Comment: right lateral chest wall.  Plan: monitor for changes.  Follow up if changing.      (R29.6) Recurrent falls  Comment: denies recent fall.    Plan: discussed option of PHYSICAL THERAPY for balance.  She declines but reports will follow up if additional fall occurs.          Patient Instructions   BP was elevated today.  Check BP at home every 1-2 days and send readings in 2 weeks to update us on what the BP is.  If elevated we should change the medication somewhat to control this.      Recheck hemoglobin and iron levels today    We can plan for PHYSICAL THERAPY if any recurrent falls are noted.                 Patient is a 72 year old in for follow up of the following problems:    Anemia:  When she was in for her wellness exam - had some labs completed and noted to have anemia with hemoglobin of 10.7.  Started taking Iron 65mg 2 months ago. Tolerating iron well.      Falls   - last fell 2-3 months ago   - reports frequent falls every couple of months   - feels falling because of weakness and dizziness    HTN -  "elevated today   - takes losartan/hctz 100/25 mg daily, has been on this dose for several years   - following low salt diet   - not drinking enough water, about 2-3 glasses a day   - no SOB, palpitation    Ankle Swelling   - occasional at end of night, better when legs elevated   - no use of compression stockings    Lump on R Side of chest   - no changes in size, nontender   - Lipoma noted in [place] note on 10/27/21 on R posterior axillary line    Chan Knees   - intermittent swelling, no pain. No redness or increased warmth   - states multiple family members with arthritis                 Social History     Tobacco Use     Smoking status: Never     Smokeless tobacco: Never   Substance Use Topics     Alcohol use: No         Review of Systems   Constitutional: Negative for chills and fever.   HENT: Negative for congestion, ear pain, hearing loss and sore throat.    Eyes: Negative for pain and visual disturbance.   Respiratory: Negative for cough and shortness of breath.    Cardiovascular: Negative for chest pain, palpitations and peripheral edema.   Gastrointestinal: Negative for abdominal pain, constipation, diarrhea, heartburn, hematochezia and nausea.   Breasts:  Negative for tenderness, breast mass and discharge.   Genitourinary: Negative for dysuria, frequency, genital sores, hematuria, pelvic pain, urgency, vaginal bleeding and vaginal discharge.   Musculoskeletal: Negative for arthralgias, joint swelling and myalgias.   Skin: Negative for rash.   Neurological: Negative for dizziness, weakness, headaches and paresthesias.   Psychiatric/Behavioral: Negative for mood changes. The patient is not nervous/anxious.          OBJECTIVE:   BP (!) 157/87   Pulse 82   Temp 98.5  F (36.9  C) (Oral)   Resp 20   Ht 1.4 m (4' 7.12\")   Wt 51.9 kg (114 lb 6.4 oz)   SpO2 98%   BMI 26.48 kg/m   Estimated body mass index is 26.48 kg/m  as calculated from the following:    Height as of this encounter: 1.4 m (4' 7.12\").    " Weight as of this encounter: 51.9 kg (114 lb 6.4 oz).  Physical Exam  GENERAL: alert, no distress and elderly  NECK: no adenopathy, no asymmetry, masses, or scars and thyroid normal to palpation  RESP: lungs clear to auscultation - no rales, rhonchi or wheezes  CV: regular rate and rhythm, normal S1 S2, no S3 or S4, no murmur, click or rub, no peripheral edema and peripheral pulses strong  ABDOMEN: soft, nontender, no hepatosplenomegaly, no masses and bowel sounds normal  MS: no gross musculoskeletal defects noted, no edema.  Mild tenderness over the medial joint line at knees bilaterally.  No laxity noted. No effusion.    SKIN:  5 cm mobile subcutaneous mass - lipomatous like on exam - right posterior axillary line.  No overlying skin change, no fluctuance.    NEURO: Normal strength and tone, sensory exam grossly normal, mentation intact, cranial nerves 2-12 intact and DTR's normal and symmetric throughout  BACK: no CVA tenderness, no paralumbar tenderness  PSYCH: mentation appears normal, affect normal/bright    Diagnostic Test Results:  Labs reviewed in Epic  Results for orders placed or performed in visit on 02/13/23   CBC with platelets     Status: Abnormal   Result Value Ref Range    WBC Count 6.7 4.0 - 11.0 10e3/uL    RBC Count 3.74 (L) 3.80 - 5.20 10e6/uL    Hemoglobin 10.9 (L) 11.7 - 15.7 g/dL    Hematocrit 34.0 (L) 35.0 - 47.0 %    MCV 91 78 - 100 fL    MCH 29.1 26.5 - 33.0 pg    MCHC 32.1 31.5 - 36.5 g/dL    RDW 12.8 10.0 - 15.0 %    Platelet Count 315 150 - 450 10e3/uL   Ferritin     Status: Normal   Result Value Ref Range    Ferritin 21 8 - 252 ng/mL     Sylvie Medrano MD  [facility]          This chart was documented by provider using a voice activated software called Dragon in addition to manual typing. There may be vocabulary errors or other grammatical errors due to this.

## 2023-02-15 ASSESSMENT — ENCOUNTER SYMPTOMS
HEMATOCHEZIA: 0
MYALGIAS: 0
FEVER: 0
CHILLS: 0
EYE PAIN: 0
CONSTIPATION: 0
FREQUENCY: 0
SORE THROAT: 0
DIZZINESS: 0
DYSURIA: 0
WEAKNESS: 0
NERVOUS/ANXIOUS: 0
PARESTHESIAS: 0
NAUSEA: 0
PALPITATIONS: 0
ARTHRALGIAS: 0
DIARRHEA: 0
ABDOMINAL PAIN: 0
HEADACHES: 0
HEARTBURN: 0
SHORTNESS OF BREATH: 0
COUGH: 0
BREAST MASS: 0
JOINT SWELLING: 0
HEMATURIA: 0

## 2023-02-16 LAB
IRON SATN MFR SERPL: 33 % (ref 15–46)
IRON SERPL-MCNC: 129 UG/DL (ref 35–180)
TIBC SERPL-MCNC: 395 UG/DL (ref 240–430)

## 2023-02-16 NOTE — RESULT ENCOUNTER NOTE
Your blood cell count continues to show anemia in spite of iron supplement use.  Your iron stores are in the normal range.  I have requested an iron level and that is still pending.  I will make additional recommendations when that result returns.  Please call or MyChart message me if you have any questions.      GLIL

## 2023-02-20 NOTE — RESULT ENCOUNTER NOTE
Your iron levels are in the normal range.  As noted, your blood cell counts are still low but are slightly better than previous.  Continue your current iron supplement and follow up labs in 6-8 weeks is recommended.  Please call or MyChart message me if you have any questions.      DUSTYK

## 2023-03-09 NOTE — PROGRESS NOTES
Addended by: BIANKA LEE on: 3/9/2023 04:49 PM     Modules accepted: Orders     HPI:  Bree Carrillo is a 71 year old female presenting for complete eye exam.    Last visit 7/2/21 recommended starting ATs prn.  6 months ago she was in [place] and [place] (born in [facility]) and started having left eye pain and redness. Saw an eye doctor there and told everything OK but to use ATs. She started these and eye pain improved. Occasionally has a little irritation left eye that resolves with ATs prn.  Vision is good. Only using glasses for reading. Does not drive.  Using: ATs prn    Past Ocular History:  LASIK each eye 1990s  Glasses  Dry eyes both eyes    PMH:  HTN  HLD  Hypothyroidism  Osteoporosis    SH:  Never smoker, no alcohol.  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 Born in [facility]    FH:  No known family history of blindness, glaucoma, macular degeneration    ASSESSMENT and PLAN:  1. Hyperopia of both eyes  2. Presbyopia  - she is happy with WRx and only uses for near; does not drive  - dispensed updated MRx per patient request    3. Combined form of age-related cataract, both eyes  - NS and cortical spoking each eye  - currently asymptomatic and happy with vision  - monitor for now    4. Dry eyes, bilateral  - continue ATs prn    5. Status post bilateral LASIK surgery  - flaps in good position  - monitor    6. Posterior vitreous detachment, bilateral  - retinas attached  - Reviewed signs/symptoms of retinal tear/detachment including shower of new floaters, flashes, curtain/veil, decreased vision, etc and patient understands to call/come in immediately for these    7. Hypertensive retinopathy  - mild  - encouraged BP control      Follow up in 1 year or sooner PRN        -----------------------------------------------------------------------------------    Attestation:  Complete documentation of historical and exam elements from today's encounter can be found in the full encounter summary report (not reduplicated in this progress note). I personally obtained the chief complaint(s) and history of  present illness.  I confirmed and edited as necessary the review of systems, past medical/surgical history, family history, social history, and examination findings as documented by others; and I examined the patient myself. I personally reviewed the relevant tests, images, and reports as documented above.     I formulated and edited as necessary the assessment and plan and discussed the findings and management plan with the patient and family.      Sindy Mccormack MD

## 2023-03-10 ENCOUNTER — TELEPHONE (OUTPATIENT)
Dept: FAMILY MEDICINE | Facility: CLINIC | Age: 73
End: 2023-03-10
Payer: COMMERCIAL

## 2023-03-10 NOTE — TELEPHONE ENCOUNTER
Pt calling in because she states she dropped off BP reading on Monday 3/6 and has not heard back on if she needs to change her dose on her BP meds. Pt wants to be sure provider received readings and advise of what she should do.  Pt prefers a mychart message from her provider

## 2023-03-10 NOTE — TELEPHONE ENCOUNTER
LVM informing pt to call or send [facility] msg to provider with some readings. The readings that were dropped off were sent to [facility] and will not be scanned into pt chart for some time as it takes a little while to get uploaded since they are approx a month or so behind.  Viji Nassar, CMA

## 2023-03-10 NOTE — TELEPHONE ENCOUNTER
I do not have any blood pressure readings available to me.  Were these given to her PCP?    If none then can be found in the office, please call patient to obtain them over the phone and route back to me and I will respond to her over MyChart.    GILL

## 2023-03-21 NOTE — TELEPHONE ENCOUNTER
Pt called back today frustrated that she had not heard any thing back from the clinic. I did explain to the pt that I did call and LVM informing her that provider could get reading over the phone. Pt stated that her  told her the VM stated something about the provider being to busy. Pt was frustrated as she has not heard back in almost 2 week from the time she dropped off her readings until she called us. I apologized that the provider did not respond sooner and that the readings were sent to scanning before the provider had a chance to view them. Pt said she will call back tomorrow to give readings over the phone.  Viji Nassar, CMA

## 2023-07-14 NOTE — PROGRESS NOTES
HPI:  Patient presents for an annual exam - interested in updating glasses prescription. Patient complains of blurry vision on the left eye for the past couple of months.       Pertinent Medical History:  Hyperlipidemia  Hypertension  Iron deficiency anemia  GI bleed    Ocular History:  Combined cataract, both eyes.   S/P LASIK, both eyes.   PVD, both eyes.   Hyperopia, both eyes.   Hypertensive retinopathy, both eyes.     Eye Medications:  None    Assessment and Plan:    #   Cataract, left eye >>>>> right eye.   Macular OCT 07/24/2023: Right eye: possible ERM; Left eye: normal   Cataract evaluation with Dr. Gonzalez.     #   Dry Eye Syndrome, both eyes.  Symptoms of dry eyes include blurry vision, eye pain, grittiness, burning, redness, eye irritation, and tearing. The goal is not to eliminate, but to improve symptoms.   Preservative free artificial tears 4 times daily both eyes. Refresh or Systane.     #   Posterior Vitreous Detachment, right eye. Retina attached.   Educated on signs and symptoms of a retinal detachment (ie. Hundreds of floaters, flashes of light, and shadow/curtain over the vision) to be seen immediately.     #   S/P LASIK, both eyes.   Monitor.     #   Epiretinal Membrane, right eye.  Macular OCT 07/24/2023: Right eye: possible ERM; Left eye: normal   Not visually significant.   Can increase risk of complications post cataract surgery - patient expresses understanding.       Patient consented to a dilated eye exam:    Yes. Side effects discussed.    Medical History:  Past Medical History:   Diagnosis Date    Hypertension        Medications:  Current Outpatient Medications   Medication Sig Dispense Refill    aspirin (ASA) 81 MG EC tablet Take 1 tablet (81 mg) by mouth daily 90 tablet 3    atorvastatin (LIPITOR) 20 MG tablet Take 1 tablet (20 mg) by mouth every evening 90 tablet 3    Calcium Citrate-Vitamin D (CITRACAL + D) 250-200 MG-UNIT TABS Citracal Calcium 400mg 2 tabs(800mg) BID (Patient  taking differently: Citracal Calcium 400mg 1  tabs(800mg) BID)      Cholecalciferol (VITAMIN D3) 1.25 MG (02732 UT) TABS       losartan-hydrochlorothiazide (HYZAAR) 100-25 MG tablet Take 1 tablet by mouth every morning 90 tablet 3   Complete documentation of historical and exam elements from today's encounter can be found in the full encounter summary report (not reduplicated in this progress note). I personally obtained the chief complaint(s) and history of present illness.  I confirmed and edited as necessary the review of systems, past medical/surgical history, family history, social history, and examination findings as documented by others; and I examined the patient myself. I personally reviewed the relevant tests, images, and reports as documented above. I formulated and edited as necessary the assessment and plan and discussed the findings and management plan with the patient and family. - Mark Lynn OD

## 2023-07-24 ENCOUNTER — OFFICE VISIT (OUTPATIENT)
Dept: OPHTHALMOLOGY | Facility: CLINIC | Age: 73
End: 2023-07-24
Attending: OPTOMETRIST
Payer: COMMERCIAL

## 2023-07-24 DIAGNOSIS — Z98.890 STATUS POST BILATERAL LASIK SURGERY: ICD-10-CM

## 2023-07-24 DIAGNOSIS — H25.813 COMBINED FORM OF AGE-RELATED CATARACT, BOTH EYES: Primary | ICD-10-CM

## 2023-07-24 DIAGNOSIS — H04.123 DRY EYES, BILATERAL: ICD-10-CM

## 2023-07-24 DIAGNOSIS — H52.4 PRESBYOPIA: ICD-10-CM

## 2023-07-24 DIAGNOSIS — Z03.89 ENCOUNTER FOR OBSERVATION FOR OTHER SUSPECTED DISEASES AND CONDITIONS RULED OUT: ICD-10-CM

## 2023-07-24 DIAGNOSIS — H43.813 PVD (POSTERIOR VITREOUS DETACHMENT), BILATERAL: ICD-10-CM

## 2023-07-24 DIAGNOSIS — H35.371 EPIRETINAL MEMBRANE (ERM) OF RIGHT EYE: ICD-10-CM

## 2023-07-24 PROCEDURE — 92134 CPTRZ OPH DX IMG PST SGM RTA: CPT | Performed by: OPTOMETRIST

## 2023-07-24 PROCEDURE — G0463 HOSPITAL OUTPT CLINIC VISIT: HCPCS | Performed by: OPTOMETRIST

## 2023-07-24 PROCEDURE — 92004 COMPRE OPH EXAM NEW PT 1/>: CPT | Performed by: OPTOMETRIST

## 2023-07-24 RX ORDER — CARBOXYMETHYLCELLULOSE SODIUM 5 MG/ML
1 SOLUTION/ DROPS OPHTHALMIC 4 TIMES DAILY
Qty: 400 EACH | Refills: 11 | Status: SHIPPED | OUTPATIENT
Start: 2023-07-24 | End: 2024-09-11

## 2023-07-24 ASSESSMENT — VISUAL ACUITY
OS_PH_SC: 20/200
OD_PH_SC+: -1
METHOD: SNELLEN - LINEAR
OD_PH_SC: 20/20
OS_SC: 20/300
OD_SC: 20/40
OD_SC+: -2

## 2023-07-24 ASSESSMENT — REFRACTION_MANIFEST
OS_SPHERE: PLANO
OS_CYLINDER: SPHERE
OD_SPHERE: +1.00
OD_ADD: +2.75
OD_CYLINDER: SPHERE
OS_ADD: +2.75

## 2023-07-24 ASSESSMENT — REFRACTION_WEARINGRX
OD_ADD: +2.75
OS_SPHERE: +1.00
OD_SPHERE: +1.00
OS_ADD: +2.75
OD_CYLINDER: +0.25
OD_AXIS: 025
SPECS_TYPE: PAL
OS_CYLINDER: SPHERE

## 2023-07-24 ASSESSMENT — TONOMETRY
OS_IOP_MMHG: 13
OD_IOP_MMHG: 14
IOP_METHOD: TONOPEN

## 2023-07-24 ASSESSMENT — CONF VISUAL FIELD
OD_NORMAL: 1
OD_INFERIOR_TEMPORAL_RESTRICTION: 0
OS_INFERIOR_NASAL_RESTRICTION: 0
OD_SUPERIOR_TEMPORAL_RESTRICTION: 0
METHOD: COUNTING FINGERS
OS_SUPERIOR_TEMPORAL_RESTRICTION: 0
OS_SUPERIOR_NASAL_RESTRICTION: 0
OD_SUPERIOR_NASAL_RESTRICTION: 0
OD_INFERIOR_NASAL_RESTRICTION: 0
OS_INFERIOR_TEMPORAL_RESTRICTION: 0
OS_NORMAL: 1

## 2023-07-24 ASSESSMENT — SLIT LAMP EXAM - LIDS
COMMENTS: NORMAL
COMMENTS: NORMAL

## 2023-07-24 ASSESSMENT — EXTERNAL EXAM - RIGHT EYE: OD_EXAM: NORMAL

## 2023-07-24 ASSESSMENT — EXTERNAL EXAM - LEFT EYE: OS_EXAM: NORMAL

## 2023-07-24 NOTE — NURSING NOTE
Chief Complaints and History of Present Illnesses   Patient presents with    Annual Eye Exam     Chief Complaint(s) and History of Present Illness(es)       Annual Eye Exam              Laterality: both eyes    Associated symptoms: Negative for dryness, eye pain, flashes and floaters    Treatments tried: artificial tears              Comments    Pt states no VA changes since last year.   Wears no Rx for distance, only reading glasses.   Uses AT PRN about 2-3 daily for dryness.  No flashes or floaters. Would like updated glasses Rx today.    Isrrael Davis 8:37 AM July 24, 2023

## 2023-07-28 ENCOUNTER — OFFICE VISIT (OUTPATIENT)
Dept: ENDOCRINOLOGY | Facility: CLINIC | Age: 73
End: 2023-07-28
Payer: COMMERCIAL

## 2023-07-28 VITALS
BODY MASS INDEX: 26.15 KG/M2 | SYSTOLIC BLOOD PRESSURE: 121 MMHG | WEIGHT: 113 LBS | HEART RATE: 76 BPM | DIASTOLIC BLOOD PRESSURE: 72 MMHG | OXYGEN SATURATION: 100 %

## 2023-07-28 DIAGNOSIS — E03.9 HYPOTHYROIDISM, UNSPECIFIED TYPE: ICD-10-CM

## 2023-07-28 DIAGNOSIS — M81.0 SENILE OSTEOPOROSIS: ICD-10-CM

## 2023-07-28 DIAGNOSIS — M81.0 AGE RELATED OSTEOPOROSIS, UNSPECIFIED PATHOLOGICAL FRACTURE PRESENCE: Primary | ICD-10-CM

## 2023-07-28 LAB
DEPRECATED CALCIDIOL+CALCIFEROL SERPL-MC: 33 UG/L (ref 20–75)
T4 FREE SERPL-MCNC: 1.15 NG/DL (ref 0.9–1.7)
TSH SERPL DL<=0.005 MIU/L-ACNC: 4.11 UIU/ML (ref 0.3–4.2)

## 2023-07-28 PROCEDURE — 84439 ASSAY OF FREE THYROXINE: CPT | Performed by: INTERNAL MEDICINE

## 2023-07-28 PROCEDURE — 82306 VITAMIN D 25 HYDROXY: CPT | Performed by: INTERNAL MEDICINE

## 2023-07-28 PROCEDURE — 36415 COLL VENOUS BLD VENIPUNCTURE: CPT | Performed by: INTERNAL MEDICINE

## 2023-07-28 PROCEDURE — 99214 OFFICE O/P EST MOD 30 MIN: CPT | Performed by: INTERNAL MEDICINE

## 2023-07-28 PROCEDURE — 84443 ASSAY THYROID STIM HORMONE: CPT | Performed by: INTERNAL MEDICINE

## 2023-07-28 RX ORDER — HEPARIN SODIUM,PORCINE 10 UNIT/ML
5-20 VIAL (ML) INTRAVENOUS DAILY PRN
Status: CANCELLED | OUTPATIENT
Start: 2023-07-28

## 2023-07-28 RX ORDER — MEPERIDINE HYDROCHLORIDE 25 MG/ML
25 INJECTION INTRAMUSCULAR; INTRAVENOUS; SUBCUTANEOUS EVERY 30 MIN PRN
Status: CANCELLED | OUTPATIENT
Start: 2023-07-28

## 2023-07-28 RX ORDER — ALBUTEROL SULFATE 0.83 MG/ML
2.5 SOLUTION RESPIRATORY (INHALATION)
Status: CANCELLED | OUTPATIENT
Start: 2023-07-28

## 2023-07-28 RX ORDER — ALBUTEROL SULFATE 90 UG/1
1-2 AEROSOL, METERED RESPIRATORY (INHALATION)
Status: CANCELLED
Start: 2023-07-28

## 2023-07-28 RX ORDER — EPINEPHRINE 1 MG/ML
0.3 INJECTION, SOLUTION INTRAMUSCULAR; SUBCUTANEOUS EVERY 5 MIN PRN
Status: CANCELLED | OUTPATIENT
Start: 2023-07-28

## 2023-07-28 RX ORDER — ZOLEDRONIC ACID 5 MG/100ML
5 INJECTION, SOLUTION INTRAVENOUS ONCE
Status: CANCELLED
Start: 2023-07-28

## 2023-07-28 RX ORDER — METHYLPREDNISOLONE SODIUM SUCCINATE 125 MG/2ML
125 INJECTION, POWDER, LYOPHILIZED, FOR SOLUTION INTRAMUSCULAR; INTRAVENOUS
Status: CANCELLED
Start: 2023-07-28

## 2023-07-28 RX ORDER — DIPHENHYDRAMINE HYDROCHLORIDE 50 MG/ML
50 INJECTION INTRAMUSCULAR; INTRAVENOUS
Status: CANCELLED
Start: 2023-07-28

## 2023-07-28 RX ORDER — HEPARIN SODIUM (PORCINE) LOCK FLUSH IV SOLN 100 UNIT/ML 100 UNIT/ML
5 SOLUTION INTRAVENOUS
Status: CANCELLED | OUTPATIENT
Start: 2023-07-28

## 2023-07-28 NOTE — LETTER
7/28/2023         RE: Bree Carrillo  [address]  [facility] [address]        Dear Colleague,    Thank you for referring your patient, Bree Carrillo, to the [facility]. Please see a copy of my visit note below.                                                                               - Endocrinology Follow up -    Reason for visit/consult:  Osteoporosis    Primary care provider: [facility], [facility]      Assessment and Plan  73 year old female with osteoporosis    # Osteoporosis  Compared to 2015, 2017 bone density showed lumbar slight improvement from a -2.7 up to -2.4.  However left hip decreased bone density, which she has had pain recently.  Prolia seems working, we will modify calcium supplement and continue Prolia for now.       - switch from Prolia to Reclast this year (she was paying $3000 per each injection with Prolia)    - Bone density Spring 2024    - continue Ca supplement 1 tab Mon/Wed/Fri she has abdominal symptoms and constipation.     - check vitamin D today      #Subclinical hypothyroidism   Was on LT4 50 mcg and held since 10/2021 but not taking currently but appears euthyroid    - TSH, free T4 check today    35 minutes spent on the date of the encounter doing chart review, history and exam, documentation and further activities as noted above.    Deanna Lundberg MD  Staff Physician  Endocrinology and Metabolism  [facility]  License: [place] [id number]  Pager: [phone]    Interval History as of 7/28/2023 : Patient has been doing well . Medication compliance : she received Reclast infusion once  . New event includes  : no pertinent medical event noted.  Interval History as of 2/25/2022 : Patient has been doing well. Last seen 1 year ago . Medication compliance good for Ca supplement . New event includes: no pertinent medical event noted, but mentioned Prolia cost $3000 out of pocket each time  .  Interval History as of 2/12/2021  : Patient has been doing well. Last year skipped prolia since COVID. No fractures. Taking citracal petite 2 tab but started to have bothering GI and stopped.   HPI: A 68 female her for osteoporosis. Osteoporosis diagnosed in 2015 with T score -2.7 lumber, by bone density. Last few month left pain hip and hands. Last 2 years she has been on prolia, so far received 4 times. Last dose of Prolia was last winter. Calcium carbnate prescribed but could not swallow because of the size.   Currently switched to Calcium carbonate gummies OTC unknown dose (4 tab).  She has never tried Fosamax.     Prior fragility fracture: no  Parental history of hip fracture: no  Rheumatoid arthritis: no  Secondary cause of osteoporosis: no  Body habitus (BMI less than or equal to 20): no  Family history of osteoporosis: no  Sedentary lifestyle: no  Current tabacco smoking: no  History of thyroid disorder: no  Calcuim and Vitmin D supplements: calicum carbonate gummies  Other supplement or bone treatment: Prolia   Medication use (PPI, epileptic medications etc): no  Early menopause (female): around 47.     Past Medical/Surgical History:  Past Medical History:   Diagnosis Date     Hypertension      Past Surgical History:   Procedure Laterality Date     LASIK BILATERAL Bilateral 2012    Dr Chavez       Allergies:  Allergies   Allergen Reactions     Iohexol Hives     Pt experienced sneeze, itching followed by hives post 100cc Omni 350 for CT Scan.   Observed with IV in place x 45 minutes.   Given 25mg PO Benadryl with OK for 50mg to be taken every 8 hours for the next 24 hours per Dr Rausch.     JRS       Current Medications   Current Outpatient Medications   Medication     atorvastatin (LIPITOR) 20 MG tablet     carboxymethylcellulose PF (CARBOXYMETHYLCELLULOSE SODIUM) 0.5 % ophthalmic solution     Cholecalciferol (VITAMIN D3) 1.25 MG (54250 UT) TABS     ferrous fumarate 65 mg, [place]. FE,-Vitamin C 125 mg (VITRON C)  MG TABS tablet      losartan-hydrochlorothiazide (HYZAAR) 100-25 MG tablet     aspirin (ASA) 81 MG EC tablet     No current facility-administered medications for this visit.       Family History:  Family History   Problem Relation Age of Onset     No Known Problems Mother      No Known Problems Father      No Known Problems Maternal Grandmother      No Known Problems Maternal Grandfather      No Known Problems Paternal Grandmother      No Known Problems Paternal Grandfather      Coronary Artery Disease Brother      No Known Problems Sister      No Known Problems Son      No Known Problems Daughter      No Known Problems Maternal Half-Brother      No Known Problems Maternal Half-Sister      No Known Problems Paternal Half-Brother      No Known Problems Paternal Half-Sister      No Known Problems Niece      No Known Problems Nephew      No Known Problems Cousin      No Known Problems Other      Diabetes No family hx of      Hypertension No family hx of      Hyperlipidemia No family hx of      Cerebrovascular Disease No family hx of      Breast Cancer No family hx of      Colon Cancer No family hx of      Prostate Cancer No family hx of      Other Cancer No family hx of      Depression No family hx of      Anxiety Disorder No family hx of      Mental Illness No family hx of      Substance Abuse No family hx of      Anesthesia Reaction No family hx of      Asthma No family hx of      Osteoporosis No family hx of      Genetic Disorder No family hx of      Thyroid Disease No family hx of      Obesity No family hx of      Unknown/Adopted No family hx of      Glaucoma No family hx of      Macular Degeneration No family hx of        Social History:  Social History     Tobacco Use     Smoking status: Never     Smokeless tobacco: Never   Substance Use Topics     Alcohol use: No   lives with , adult kids (37, 35). 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     ROS:  Full review of systems taken with the help of the intake sheet. Otherwise a  complete 14 point review of systems was taken and is negative unless stated in the history above.    Physical Exam:   Blood pressure 121/72, pulse 76, weight 51.3 kg (113 lb), SpO2 100 %, not currently breastfeeding.    General: well appearing, no acute distress, pleasant and conversant,   Mental Status/neuro: alert and oriented  Face: symmetrical, normal facial color  Eyes: anicteric, PERRL, no proptosis or lid lag  Neck: suppler, no lymphadenopahty  Thyroid: normal size and texture, no nodule palpable, no bruits  Back: No scoliosis no kyphosis  Heart: regular rhythm, S1S2, no murmur appreciated  Lung: clear to auscultation bilaterally  Abdomen: soft, NT/ND, no hepatomegaly  Legs: no swelling or edema      Labs : I reviewed data from epic and extract and summarize the pertinent data here.   Lab Results   Component Value Date     07/23/2018      Lab Results   Component Value Date    POTASSIUM 4.0 07/23/2018     Lab Results   Component Value Date    CHLORIDE 107 07/23/2018     Lab Results   Component Value Date    DEVAN 8.3 07/23/2018     Lab Results   Component Value Date    CO2 29 07/23/2018     Lab Results   Component Value Date    BUN 20 07/23/2018     Lab Results   Component Value Date    CR 0.70 07/23/2018     Lab Results   Component Value Date    GLC 90 07/23/2018     Lab Results   Component Value Date    TSH 2.35 07/23/2018     Lab Results   Component Value Date    T4 0.96 06/28/2018       Bone Denisty: 8/4/2017: I also personally reviewed the original images and explained to the patient .      1.  This patient's T-score meets the World Health Organization (WHO)   criteria for osteoporosis at one or more measured sites (T-score -2.5 or   below).  The risk of osteoporotic fracture increases approximately   two-fold for each 1.0 SD decrease in T-score.    2.  Moderate levoscoliosis of lumbar spine.       COMPARISON:  Comparison with previous study dated 06/17/2015 shows:    There was a statistically  significant increase in the bone mineral density   in the spine.    There was no statistically significant change in the bone mineral density   in the right hip.    There was a statistically significant decrease in the bone mineral density   in the left hip.      Comparison with baseline study dated 09/05/2013 shows:    There was a statistically significant increase in the bone mineral density   in the spine.    There was no statistically significant change in the bone mineral density   in the right hip.    There was a statistically significant decrease in the bone mineral density   in the left hip.      Statistical significance is determined by the least significant change   (LSC) for the scanner and operators at this facility.    FRAX is not reported because this patient's T-score meets the World Health   Organization (WHO) criteria for osteoporosis and this patient is currently   taking medication for abnormal bone density.    The scan details [facility]    Radha Navarro M.D.    Breast/Diagnostic Radiologist  [facility]  [email or web address]  SJA:[place]  R:8/4/2017 T:8/4/2017   Result Narrative   DIAGNOSTIC DXA BONE MINERAL DENSITY, 8/4/2017    CLINICAL HISTORY:  This is a 67-year-old female patient.      RISK FACTORS:  The patient has estrogen deficiency.  The patient has a   history of osteoporosis based on a prior BMD study.  The patient has a   body weight under 127 pounds.  The patient has the following medical   condition(s):  Vitamin D deficiency.  The patient has taken for at least   one month the following medications in the last year:  Medications for   osteoporosis.    TECHNIQUE:  Dual-energy x-ray absorptiometry system (axial skeleton).  The   patient was scanned on a GE Lunar Prodigy scanner, fast-array scan mode.    The study was technically adequate.      FINDINGS:       BMD T-Score Z-Score   Lumbar Spine (L1 to L4) 0.896 g/cm2  -2.4 -0.2   Right Hip Femoral Neck 0.758 g/cm2 -2.0 -0.1   Right Total Hip (BMD for comparison to prior) 0.773 g/cm2 -1.9 -0.1   Left Hip Femoral Neck 0.688 g/cm2 -2.5 -0.6   Left Total Hip (BMD for comparison to prior) 0.757 g/cm2 -2.0 -0.2     6/17/2015    DIAGNOSTIC DXA BONE MINERAL DENSITY, 6/17/2015    CLINICAL HISTORY:  This is a 64-year-old female patient.      RISK FACTORS:  The patient has estrogen deficiency.  The patient has a   history of low bone density based on a prior BMD study (osteopenia).  The   patient has a body weight under 127 pounds.      TECHNIQUE:  Dual-energy x-ray absorptiometry system (axial skeleton).  The   patient was scanned on a GE Lunar Prodigy scanner, fast-array scan mode.    The study was technically adequate.      FINDINGS:       BMD T-Score Z-Score   Lumbar Spine (L1 to L4) 0.861 g/cm2 -2.7 -0.6   Right Hip Femoral Neck 0.748 g/cm2 -2.1 -0.3   Right Total Hip   0.739 g/cm2 -2.1 -0.6   Left Hip Femoral Neck 0.722 g/cm2 -2.3 -0.5   Left Total Hip   0.808 g/cm2 -1.6 0.0       Again, thank you for allowing me to participate in the care of your patient.        Sincerely,        Deanna Lundberg MD

## 2023-07-28 NOTE — PROGRESS NOTES
- Endocrinology Follow up -    Reason for visit/consult:  Osteoporosis    Primary care provider: [facility]      Assessment and Plan  73 year old female with osteoporosis    # Osteoporosis  Compared to 2015, 2017 bone density showed lumbar slight improvement from a -2.7 up to -2.4.  However left hip decreased bone density, which she has had pain recently.  Prolia seems working, we will modify calcium supplement and continue Prolia for now.       - switch from Prolia to Reclast this year (she was paying $3000 per each injection with Prolia)    - Bone density Spring 2024    - continue Ca supplement 1 tab Mon/Wed/Fri she has abdominal symptoms and constipation.     - check vitamin D today      #Subclinical hypothyroidism   Was on LT4 50 mcg and held since 10/2021 but not taking currently but appears euthyroid    - TSH, free T4 check today    35 minutes spent on the date of the encounter doing chart review, history and exam, documentation and further activities as noted above.    Deanna Lundberg MD  Staff Physician  Endocrinology and Metabolism  [facility]  License: [place] [id number]  Pager: [phone]    Interval History as of 7/28/2023 : Patient has been doing well . Medication compliance : she received Reclast infusion once  . New event includes  : no pertinent medical event noted.  Interval History as of 2/25/2022 : Patient has been doing well. Last seen 1 year ago . Medication compliance good for Ca supplement . New event includes: no pertinent medical event noted, but mentioned Prolia cost $3000 out of pocket each time  .  Interval History as of 2/12/2021 : Patient has been doing well. Last year skipped prolia since COVID. No fractures. Taking citracal petite 2 tab but started to have bothering GI and stopped.   HPI: A 68 female her for osteoporosis. Osteoporosis diagnosed in 2015 with T score -2.7  lumber, by bone density. Last few month left pain hip and hands. Last 2 years she has been on prolia, so far received 4 times. Last dose of Prolia was last winter. Calcium carbnate prescribed but could not swallow because of the size.   Currently switched to Calcium carbonate gummies OTC unknown dose (4 tab).  She has never tried Fosamax.     Prior fragility fracture: no  Parental history of hip fracture: no  Rheumatoid arthritis: no  Secondary cause of osteoporosis: no  Body habitus (BMI less than or equal to 20): no  Family history of osteoporosis: no  Sedentary lifestyle: no  Current tabacco smoking: no  History of thyroid disorder: no  Calcuim and Vitmin D supplements: calicum carbonate gummies  Other supplement or bone treatment: Prolia   Medication use (PPI, epileptic medications etc): no  Early menopause (female): around 47.     Past Medical/Surgical History:  Past Medical History:   Diagnosis Date     Hypertension      Past Surgical History:   Procedure Laterality Date     LASIK BILATERAL Bilateral 2012    Dr Chavez       Allergies:  Allergies   Allergen Reactions     Iohexol Hives     Pt experienced sneeze, itching followed by hives post 100cc Omni 350 for CT Scan.   Observed with IV in place x 45 minutes.   Given 25mg PO Benadryl with OK for 50mg to be taken every 8 hours for the next 24 hours per Dr Rausch.     JRS       Current Medications   Current Outpatient Medications   Medication     atorvastatin (LIPITOR) 20 MG tablet     carboxymethylcellulose PF (CARBOXYMETHYLCELLULOSE SODIUM) 0.5 % ophthalmic solution     Cholecalciferol (VITAMIN D3) 1.25 MG (01055 UT) TABS     ferrous fumarate 65 mg, [place]. FE,-Vitamin C 125 mg (VITRON C)  MG TABS tablet     losartan-hydrochlorothiazide (HYZAAR) 100-25 MG tablet     aspirin (ASA) 81 MG EC tablet     No current facility-administered medications for this visit.       Family History:  Family History   Problem Relation Age of Onset     No Known Problems Mother       No Known Problems Father      No Known Problems Maternal Grandmother      No Known Problems Maternal Grandfather      No Known Problems Paternal Grandmother      No Known Problems Paternal Grandfather      Coronary Artery Disease Brother      No Known Problems Sister      No Known Problems Son      No Known Problems Daughter      No Known Problems Maternal Half-Brother      No Known Problems Maternal Half-Sister      No Known Problems Paternal Half-Brother      No Known Problems Paternal Half-Sister      No Known Problems Niece      No Known Problems Nephew      No Known Problems Cousin      No Known Problems Other      Diabetes No family hx of      Hypertension No family hx of      Hyperlipidemia No family hx of      Cerebrovascular Disease No family hx of      Breast Cancer No family hx of      Colon Cancer No family hx of      Prostate Cancer No family hx of      Other Cancer No family hx of      Depression No family hx of      Anxiety Disorder No family hx of      Mental Illness No family hx of      Substance Abuse No family hx of      Anesthesia Reaction No family hx of      Asthma No family hx of      Osteoporosis No family hx of      Genetic Disorder No family hx of      Thyroid Disease No family hx of      Obesity No family hx of      Unknown/Adopted No family hx of      Glaucoma No family hx of      Macular Degeneration No family hx of        Social History:  Social History     Tobacco Use     Smoking status: Never     Smokeless tobacco: Never   Substance Use Topics     Alcohol use: No   lives with , adult kids (37, 35). 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     ROS:  Full review of systems taken with the help of the intake sheet. Otherwise a complete 14 point review of systems was taken and is negative unless stated in the history above.    Physical Exam:   Blood pressure 121/72, pulse 76, weight 51.3 kg (113 lb), SpO2 100 %, not currently breastfeeding.    General: well appearing, no acute  distress, pleasant and conversant,   Mental Status/neuro: alert and oriented  Face: symmetrical, normal facial color  Eyes: anicteric, PERRL, no proptosis or lid lag  Neck: suppler, no lymphadenopahty  Thyroid: normal size and texture, no nodule palpable, no bruits  Back: No scoliosis no kyphosis  Heart: regular rhythm, S1S2, no murmur appreciated  Lung: clear to auscultation bilaterally  Abdomen: soft, NT/ND, no hepatomegaly  Legs: no swelling or edema      Labs : I reviewed data from epic and extract and summarize the pertinent data here.   Lab Results   Component Value Date     07/23/2018      Lab Results   Component Value Date    POTASSIUM 4.0 07/23/2018     Lab Results   Component Value Date    CHLORIDE 107 07/23/2018     Lab Results   Component Value Date    DEVAN 8.3 07/23/2018     Lab Results   Component Value Date    CO2 29 07/23/2018     Lab Results   Component Value Date    BUN 20 07/23/2018     Lab Results   Component Value Date    CR 0.70 07/23/2018     Lab Results   Component Value Date    GLC 90 07/23/2018     Lab Results   Component Value Date    TSH 2.35 07/23/2018     Lab Results   Component Value Date    T4 0.96 06/28/2018       Bone Denisty: 8/4/2017: I also personally reviewed the original images and explained to the patient .      1.  This patient's T-score meets the World Health Organization (WHO)   criteria for osteoporosis at one or more measured sites (T-score -2.5 or   below).  The risk of osteoporotic fracture increases approximately   two-fold for each 1.0 SD decrease in T-score.    2.  Moderate levoscoliosis of lumbar spine.       COMPARISON:  Comparison with previous study dated 06/17/2015 shows:    There was a statistically significant increase in the bone mineral density   in the spine.    There was no statistically significant change in the bone mineral density   in the right hip.    There was a statistically significant decrease in the bone mineral density   in the left  hip.      Comparison with baseline study dated 09/05/2013 shows:    There was a statistically significant increase in the bone mineral density   in the spine.    There was no statistically significant change in the bone mineral density   in the right hip.    There was a statistically significant decrease in the bone mineral density   in the left hip.      Statistical significance is determined by the least significant change   (LSC) for the scanner and operators at this facility.    FRAX is not reported because this patient's T-score meets the World Health   Organization (WHO) criteria for osteoporosis and this patient is currently   taking medication for abnormal bone density.    The scan details are [facility].    Radha Navarro M.D.    Breast/Diagnostic Radiologist  [facility]  [email or web address]  SJA:[place]  R:8/4/2017 T:8/4/2017   Result Narrative   DIAGNOSTIC DXA BONE MINERAL DENSITY, 8/4/2017    CLINICAL HISTORY:  This is a 67-year-old female patient.      RISK FACTORS:  The patient has estrogen deficiency.  The patient has a   history of osteoporosis based on a prior BMD study.  The patient has a   body weight under 127 pounds.  The patient has the following medical   condition(s):  Vitamin D deficiency.  The patient has taken for at least   one month the following medications in the last year:  Medications for   osteoporosis.    TECHNIQUE:  Dual-energy x-ray absorptiometry system (axial skeleton).  The   patient was scanned on a GE Lunar Prodigy scanner, fast-array scan mode.    The study was technically adequate.      FINDINGS:       BMD T-Score Z-Score   Lumbar Spine (L1 to L4) 0.896 g/cm2 -2.4 -0.2   Right Hip Femoral Neck 0.758 g/cm2 -2.0 -0.1   Right Total Hip (BMD for comparison to prior) 0.773 g/cm2 -1.9 -0.1   Left Hip Femoral Neck 0.688 g/cm2 -2.5 -0.6   Left Total Hip (BMD for comparison to prior) 0.757 g/cm2 -2.0 -0.2      6/17/2015    DIAGNOSTIC DXA BONE MINERAL DENSITY, 6/17/2015    CLINICAL HISTORY:  This is a 64-year-old female patient.      RISK FACTORS:  The patient has estrogen deficiency.  The patient has a   history of low bone density based on a prior BMD study (osteopenia).  The   patient has a body weight under 127 pounds.      TECHNIQUE:  Dual-energy x-ray absorptiometry system (axial skeleton).  The   patient was scanned on a GE Lunar Prodigy scanner, fast-array scan mode.    The study was technically adequate.      FINDINGS:       BMD T-Score Z-Score   Lumbar Spine (L1 to L4) 0.861 g/cm2 -2.7 -0.6   Right Hip Femoral Neck 0.748 g/cm2 -2.1 -0.3   Right Total Hip   0.739 g/cm2 -2.1 -0.6   Left Hip Femoral Neck 0.722 g/cm2 -2.3 -0.5   Left Total Hip   0.808 g/cm2 -1.6 0.0

## 2023-07-28 NOTE — NURSING NOTE
Bree Carrillo's goals for this visit include:   Chief Complaint   Patient presents with    Thyroid Disease    Osteoporosis     She requests these members of her care team be copied on today's visit information: Yes    PCP: Tobias Carlos    Referring Provider:  Reina Corley APRN CNP  [address] [place] [address]    /72 (BP Location: Left arm, Patient Position: Sitting, Cuff Size: Adult Regular)   Pulse 76   Wt 51.3 kg (113 lb)   SpO2 100%   BMI 26.15 kg/m      Do you need any medication refills at today's visit? No

## 2023-07-31 ENCOUNTER — TELEPHONE (OUTPATIENT)
Dept: OPHTHALMOLOGY | Facility: CLINIC | Age: 73
End: 2023-07-31
Payer: COMMERCIAL

## 2023-07-31 NOTE — TELEPHONE ENCOUNTER
MARLENEM for pt to call and schedule a cataract eval with Dr. Gonzalez (per Dr. Desai's referral).    Direct call back number provided.    SHAE Mcknight 3:25 PM July 31, 2023

## 2023-08-02 DIAGNOSIS — H25.813 COMBINED FORM OF AGE-RELATED CATARACT, BOTH EYES: Primary | ICD-10-CM

## 2023-08-03 ENCOUNTER — OFFICE VISIT (OUTPATIENT)
Dept: OPHTHALMOLOGY | Facility: CLINIC | Age: 73
End: 2023-08-03
Attending: OPHTHALMOLOGY
Payer: COMMERCIAL

## 2023-08-03 DIAGNOSIS — H25.813 COMBINED FORM OF AGE-RELATED CATARACT, BOTH EYES: ICD-10-CM

## 2023-08-03 DIAGNOSIS — H52.223 REGULAR ASTIGMATISM OF BOTH EYES: Primary | ICD-10-CM

## 2023-08-03 PROCEDURE — 92025 CPTRIZED CORNEAL TOPOGRAPHY: CPT | Performed by: OPHTHALMOLOGY

## 2023-08-03 PROCEDURE — G0463 HOSPITAL OUTPT CLINIC VISIT: HCPCS | Performed by: OPHTHALMOLOGY

## 2023-08-03 PROCEDURE — 99214 OFFICE O/P EST MOD 30 MIN: CPT | Mod: GC | Performed by: OPHTHALMOLOGY

## 2023-08-03 PROCEDURE — 76519 ECHO EXAM OF EYE: CPT | Performed by: OPHTHALMOLOGY

## 2023-08-03 ASSESSMENT — TONOMETRY
OS_IOP_MMHG: 15
IOP_METHOD: TONOPEN
OD_IOP_MMHG: 18

## 2023-08-03 ASSESSMENT — CONF VISUAL FIELD
OS_NORMAL: 1
OS_INFERIOR_NASAL_RESTRICTION: 0
OS_SUPERIOR_TEMPORAL_RESTRICTION: 0
METHOD: COUNTING FINGERS
OD_INFERIOR_NASAL_RESTRICTION: 0
OD_SUPERIOR_TEMPORAL_RESTRICTION: 0
OD_INFERIOR_TEMPORAL_RESTRICTION: 0
OD_SUPERIOR_NASAL_RESTRICTION: 0
OS_INFERIOR_TEMPORAL_RESTRICTION: 0
OD_NORMAL: 1
OS_SUPERIOR_NASAL_RESTRICTION: 0

## 2023-08-03 ASSESSMENT — VISUAL ACUITY
OS_SC: 20/300
OD_SC+: +1
OD_PH_SC: 20/25
OD_SC: 20/40
METHOD: SNELLEN - LINEAR
OD_PH_SC+: -2

## 2023-08-03 ASSESSMENT — SLIT LAMP EXAM - LIDS
COMMENTS: NORMAL
COMMENTS: NORMAL

## 2023-08-03 ASSESSMENT — EXTERNAL EXAM - RIGHT EYE: OD_EXAM: NORMAL

## 2023-08-03 ASSESSMENT — EXTERNAL EXAM - LEFT EYE: OS_EXAM: NORMAL

## 2023-08-03 NOTE — LETTER
8/3/2023       RE: Bree Carrillo  [address]  [facility] [address]     Dear Dr. Desai,    Thank you for referring your patient, Bree Carrillo, to the [facility] [place] at [facility]. Please see a copy of my visit note below.       CC:   First appointment with me  Referred by: Dr Desai     HPI: Bree Carrillo is a 73 year old year-old patient with history of bilateral refractive surgery who presents for cataract evaluation.    PMH: No diabetes, no   Past ocular history: Lasik each eye   FH:      Retinal Imaging:  OCT mac 7/24/23  OD: normal foveal contour, no IRF, no SRF, no RPE detachments, appropriate choroidal thickness  OS: normal foveal contour, no IRF, no SRF, no RPE detachments, appropriate choroidal thickness      Assessment & Plan:    #   Cataract, left eye >>>>> right eye.   Plan for Cataract extraction intraocular lens   Each eye cataract is visually significant, Reports impacts ADL and has glare. Start with left eye   Patient wanting September for first eye    Surgeon procedure time:  45 min  Urgency of Surgery: Routine  Post-op apps needed: 1day; 1 week; 3 weeks  Multi surgeon case: No   H&P completed by primary care physician/PAC   needed: NO  Anesthesia: Peribulbar block    Aim for: Emmetropia  Dilation:Good  Iris expansion: Not needed  Epinephrine: No  Pseudoexfoliation: NO  Trypan Blue: Yes  Phacodonesis: No  Cornea guttae: rare  Anticoagulants: NO  Flomax: NO  Candidate for multifocal or toric IOL: NO  Visually significant astigmatism: Discussed elective surgical refractive corrective options    I reviewed the indications, risks, benefits, and alternatives of the proposed surgical procedure. We discussed at length cataracts and the effect of the cataracts on vision.   We discussed the fact that modern cataract surgery is usually successful at alleviating symptoms of glare when the cataract is the causative  factor. Other risks were discussed with patient including, but not limited to, failure to improve vision or further loss of vision, bleeding, infection, loss of vision and the remote possibility of complications of anesthesia or need for additional surgery. 1:1000 risk of infection/bleed/loss of eye; 1:100 risk of RD and need for further surgery.  Patient agreed to proceed with surgery.  I provided multiple opportunities for the questions, answered all questions to the best of my ability, and confirmed that my answers and my discussion were understood.     #   Dry Eye Syndrome, both eyes.  Symptoms of dry eyes include blurry vision, eye pain, grittiness, burning, redness, eye irritation, and tearing. The goal is not to eliminate, but to improve symptoms.   Preservative free artificial tears 4 times daily both eyes. Refresh or Systane.      #   Posterior Vitreous Detachment, right eye. Retina attached.   Educated on signs and symptoms of a retinal detachment (ie. Hundreds of floaters, flashes of light, and shadow/curtain over the vision) to be seen immediately.      #   S/P LASIK, both eyes.   Monitor, attempting to request records from lasik     #   Epiretinal Membrane, right eye.  Macular OCT 07/24/2023: Right eye: possible ERM; Left eye: normal   Not visually significant.   Can increase risk of complications post cataract surgery - patient expresses understanding.     Follow-up: PO    Thank you for entrusting us with your care  Charo Galindo MD, PGY3  Ophthalmology Resident  [facility]      Barb Gonzalez MD  Professor of Ophthalmology.   Vitreo-retinal surgeon   Department of Ophthalmology and Visual Neurosciences   [facility]  Phone: [phone]   Fax: [phone]

## 2023-08-03 NOTE — PROGRESS NOTES
CC: cataract evaluation   First appointment with me  Referred by: Dr. Desai     HPI: Bree Carrillo is a 73 year old year-old patient with history of bilateral refractive surgery who presents for cataract evaluation.    PMH: No diabetes, no   Past ocular history: Lasik each eye     Retinal Imaging:  OCT mac 7/24/23  OD: normal foveal contour, no IRF, no SRF, no RPE detachments, appropriate choroidal thickness  OS: normal foveal contour, no IRF, no SRF, no RPE detachments, appropriate choroidal thickness    Assessment & Plan:    #   visually significant Cataract, left eye >>>>> right eye.   Significant cortical and Posterior subcapsular cataract (PSC)   Plan for Cataract extraction intraocular lens left eye first  Each eye cataract is visually significant, Reports impacts ADL and has glare. Start with left eye   Patient wanting September for first eye    Surgeon procedure time:  45 min  Urgency of Surgery: Routine  Post-op apps needed: 1day; 1 week; 3 weeks  Multi surgeon case: No   H&P completed by primary care physician/PAC   needed: NO  Anesthesia: Peribulbar block    Aim for: Emmetropia  Dilation:Good  Iris expansion: Not needed  Epinephrine: No  Pseudoexfoliation: NO  Trypan Blue: Yes  Phacodonesis: No  Cornea guttae: rare  Anticoagulants: NO  Flomax: NO  Candidate for multifocal or toric IOL: NO  Visually significant astigmatism: Discussed elective surgical refractive corrective options    I reviewed the indications, risks, benefits, and alternatives of the proposed surgical procedure. We discussed at length cataracts and the effect of the cataracts on vision.   We discussed the fact that modern cataract surgery is usually successful at alleviating symptoms of glare when the cataract is the causative factor. Other risks were discussed with patient including, but not limited to, failure to improve vision or further loss of vision, bleeding, infection, loss of vision and the remote possibility of  complications of anesthesia or need for additional surgery. 1:1000 risk of infection/bleed/loss of eye; 1:100 risk of RD and need for further surgery.  Patient agreed to proceed with surgery.  I provided multiple opportunities for the questions, answered all questions to the best of my ability, and confirmed that my answers and my discussion were understood.     Plan to obtain refraction prior to lasik    #   Dry Eye Syndrome, both eyes.  Symptoms of dry eyes include blurry vision, eye pain, grittiness, burning, redness, eye irritation, and tearing. The goal is not to eliminate, but to improve symptoms.   Preservative free artificial tears 4 times daily both eyes. Refresh or Systane.      #   Posterior Vitreous Detachment, right eye. Retina attached.   Educated on signs and symptoms of a retinal detachment (ie. Hundreds of floaters, flashes of light, and shadow/curtain over the vision) to be seen immediately.      #   S/P LASIK, both eyes.   Monitor, attempting to request records from lasik     #   Epiretinal Membrane, right eye.  Macular OCT 07/24/2023: Right eye: possible ERM; Left eye: normal   Not visually significant.   Can increase risk of complications post cataract surgery - patient expresses understanding.     Follow-up: POD1    Thank you for entrusting us with your care  Charo Galindo MD, PGY3  Ophthalmology Resident  [facility]      ~~~~~~~~~~~~~~~~~~~~~~~~~~~~~~~~~~   Complete documentation of historical and exam elements from today's encounter can be found in the full encounter summary report (not reduplicated in this progress note).  I personally obtained the chief complaint(s) and history of present illness.  I confirmed and edited as necessary the review of systems, past medical/surgical history, family history, social history, and examination findings as documented by others; and I examined the patient myself.  I personally reviewed the relevant tests, images, and reports as documented  above.  I personally reviewed the ophthalmic test(s) associated with this encounter, agree with the interpretation(s) as documented by the resident/fellow, and have edited the corresponding report(s) as necessary.   I formulated and edited as necessary the assessment and plan and discussed the findings and management plan with the patient and family    Barb Gonzalez MD  Professor of Ophthalmology.   Vitreo-retinal surgeon   Department of Ophthalmology and Visual Neurosciences   [facility]  Phone: [phone]   Fax: [phone]

## 2023-08-03 NOTE — NURSING NOTE
Chief Complaints and History of Present Illnesses   Patient presents with    Consult For     Cataracts referred by Dr Desai     Chief Complaint(s) and History of Present Illness(es)       Consult For              Laterality: both eyes    Quality: hazy    Context: distance vision, mid-range vision, near vision and reading    Course: rapidly worsening    Associated symptoms: glare, haloes, eye pain (intermittent left eye) and tearing    Treatments tried: artificial tears    Pain scale: 0/10 (None currently)    Comments: Cataracts referred by Dr Desai              Comments    She states that her vision in the left eye has gotten poor in the past couple months.  She is unable to see in the distance or up close with that eye.  She also get some tearing and pain in her left eye.    Radha Peralta, COT 9:23 AM  August 3, 2023

## 2023-08-04 ENCOUNTER — TELEPHONE (OUTPATIENT)
Dept: OPHTHALMOLOGY | Facility: CLINIC | Age: 73
End: 2023-08-04
Payer: COMMERCIAL

## 2023-08-04 PROBLEM — H25.813 COMBINED FORM OF AGE-RELATED CATARACT, BOTH EYES: Status: ACTIVE | Noted: 2023-08-03

## 2023-08-04 NOTE — TELEPHONE ENCOUNTER
Scheduled surgery for 8/29 in [facility] with Dr. Gonzalez    H&P scheduled with Dr. Medrano on 8/14    Post-op scheduled for 8/30, 9/6, and 9/20 with Dr. Gonzalez in [place].    Writer will mail surgery packet.    No further questions/concerns at this time.     Jaclyn Coleman on 08/04/23 at 2:55 PM.  Senior Perioperative Coordinator   Ph: [phone]

## 2023-08-22 ENCOUNTER — TELEPHONE (OUTPATIENT)
Dept: OPHTHALMOLOGY | Facility: CLINIC | Age: 73
End: 2023-08-22
Payer: COMMERCIAL

## 2023-08-22 NOTE — TELEPHONE ENCOUNTER
Called regarding refractive surgery information/records. Neither patient nor her  remember the name of the surgeon or the center where it was done and have been unsuccessful in finding any records to be able to get that information.  will try talking to son and send [employer] message on Friday with more information.

## 2023-08-24 ENCOUNTER — TELEPHONE (OUTPATIENT)
Dept: OPHTHALMOLOGY | Facility: CLINIC | Age: 73
End: 2023-08-24
Payer: COMMERCIAL

## 2023-08-24 NOTE — TELEPHONE ENCOUNTER
Called to touch base with Bree based on pre-op nurse message. Bree says they are too busy right now to have surgery. Bree took my name and direct number to call back and schedule once ready.

## 2023-08-29 ENCOUNTER — HOSPITAL ENCOUNTER (OUTPATIENT)
Facility: AMBULATORY SURGERY CENTER | Age: 73
Discharge: HOME | End: 2023-08-29
Attending: OPHTHALMOLOGY
Payer: COMMERCIAL

## 2023-09-08 ENCOUNTER — LAB (OUTPATIENT)
Dept: LAB | Facility: CLINIC | Age: 73
End: 2023-09-08
Payer: COMMERCIAL

## 2023-09-08 ENCOUNTER — INFUSION THERAPY VISIT (OUTPATIENT)
Dept: INFUSION THERAPY | Facility: CLINIC | Age: 73
End: 2023-09-08
Attending: INTERNAL MEDICINE
Payer: COMMERCIAL

## 2023-09-08 VITALS
TEMPERATURE: 98 F | RESPIRATION RATE: 16 BRPM | HEART RATE: 74 BPM | OXYGEN SATURATION: 98 % | HEIGHT: 56 IN | SYSTOLIC BLOOD PRESSURE: 125 MMHG | BODY MASS INDEX: 25.19 KG/M2 | WEIGHT: 112 LBS | DIASTOLIC BLOOD PRESSURE: 73 MMHG

## 2023-09-08 DIAGNOSIS — M81.0 SENILE OSTEOPOROSIS: Primary | ICD-10-CM

## 2023-09-08 DIAGNOSIS — I10 BENIGN ESSENTIAL HYPERTENSION: ICD-10-CM

## 2023-09-08 LAB
ANION GAP SERPL CALCULATED.3IONS-SCNC: 12 MMOL/L (ref 7–15)
BUN SERPL-MCNC: 24.1 MG/DL (ref 8–23)
CALCIUM SERPL-MCNC: 9.5 MG/DL (ref 8.8–10.2)
CALCIUM SERPL-MCNC: 9.5 MG/DL (ref 8.8–10.2)
CHLORIDE SERPL-SCNC: 103 MMOL/L (ref 98–107)
CREAT SERPL-MCNC: 0.97 MG/DL (ref 0.51–0.95)
CREAT SERPL-MCNC: 0.97 MG/DL (ref 0.51–0.95)
DEPRECATED HCO3 PLAS-SCNC: 25 MMOL/L (ref 22–29)
EGFRCR SERPLBLD CKD-EPI 2021: 61 ML/MIN/1.73M2
EGFRCR SERPLBLD CKD-EPI 2021: 61 ML/MIN/1.73M2
GLUCOSE SERPL-MCNC: 91 MG/DL (ref 70–99)
HOLD SPECIMEN: NORMAL
HOLD SPECIMEN: NORMAL
POTASSIUM SERPL-SCNC: 3.7 MMOL/L (ref 3.4–5.3)
SODIUM SERPL-SCNC: 140 MMOL/L (ref 136–145)

## 2023-09-08 PROCEDURE — 80048 BASIC METABOLIC PNL TOTAL CA: CPT

## 2023-09-08 PROCEDURE — 96365 THER/PROPH/DIAG IV INF INIT: CPT | Performed by: INTERNAL MEDICINE

## 2023-09-08 PROCEDURE — 36415 COLL VENOUS BLD VENIPUNCTURE: CPT | Performed by: INTERNAL MEDICINE

## 2023-09-08 RX ORDER — HEPARIN SODIUM,PORCINE 10 UNIT/ML
5-20 VIAL (ML) INTRAVENOUS DAILY PRN
OUTPATIENT
Start: 2023-09-08

## 2023-09-08 RX ORDER — EPINEPHRINE 1 MG/ML
0.3 INJECTION, SOLUTION INTRAMUSCULAR; SUBCUTANEOUS EVERY 5 MIN PRN
OUTPATIENT
Start: 2023-09-08

## 2023-09-08 RX ORDER — HEPARIN SODIUM (PORCINE) LOCK FLUSH IV SOLN 100 UNIT/ML 100 UNIT/ML
5 SOLUTION INTRAVENOUS
Status: CANCELLED | OUTPATIENT
Start: 2023-09-08

## 2023-09-08 RX ORDER — DIPHENHYDRAMINE HYDROCHLORIDE 50 MG/ML
50 INJECTION INTRAMUSCULAR; INTRAVENOUS
Start: 2023-09-08

## 2023-09-08 RX ORDER — ZOLEDRONIC ACID 5 MG/100ML
5 INJECTION, SOLUTION INTRAVENOUS ONCE
Status: CANCELLED
Start: 2023-09-08

## 2023-09-08 RX ORDER — HEPARIN SODIUM,PORCINE 10 UNIT/ML
5-20 VIAL (ML) INTRAVENOUS DAILY PRN
Status: CANCELLED | OUTPATIENT
Start: 2023-09-08

## 2023-09-08 RX ORDER — ALBUTEROL SULFATE 90 UG/1
1-2 AEROSOL, METERED RESPIRATORY (INHALATION)
Start: 2023-09-08

## 2023-09-08 RX ORDER — HEPARIN SODIUM (PORCINE) LOCK FLUSH IV SOLN 100 UNIT/ML 100 UNIT/ML
5 SOLUTION INTRAVENOUS
OUTPATIENT
Start: 2023-09-08

## 2023-09-08 RX ORDER — ALBUTEROL SULFATE 90 UG/1
1-2 AEROSOL, METERED RESPIRATORY (INHALATION)
Status: CANCELLED
Start: 2023-09-08

## 2023-09-08 RX ORDER — ZOLEDRONIC ACID 5 MG/100ML
5 INJECTION, SOLUTION INTRAVENOUS ONCE
Status: COMPLETED | OUTPATIENT
Start: 2023-09-08 | End: 2023-09-08

## 2023-09-08 RX ORDER — ALBUTEROL SULFATE 0.83 MG/ML
2.5 SOLUTION RESPIRATORY (INHALATION)
Status: CANCELLED | OUTPATIENT
Start: 2023-09-08

## 2023-09-08 RX ORDER — MEPERIDINE HYDROCHLORIDE 25 MG/ML
25 INJECTION INTRAMUSCULAR; INTRAVENOUS; SUBCUTANEOUS EVERY 30 MIN PRN
OUTPATIENT
Start: 2023-09-08

## 2023-09-08 RX ORDER — EPINEPHRINE 1 MG/ML
0.3 INJECTION, SOLUTION, CONCENTRATE INTRAVENOUS EVERY 5 MIN PRN
Status: CANCELLED | OUTPATIENT
Start: 2023-09-08

## 2023-09-08 RX ORDER — METHYLPREDNISOLONE SODIUM SUCCINATE 125 MG/2ML
125 INJECTION, POWDER, LYOPHILIZED, FOR SOLUTION INTRAMUSCULAR; INTRAVENOUS
Start: 2023-09-08

## 2023-09-08 RX ORDER — ALBUTEROL SULFATE 0.83 MG/ML
2.5 SOLUTION RESPIRATORY (INHALATION)
OUTPATIENT
Start: 2023-09-08

## 2023-09-08 RX ORDER — DIPHENHYDRAMINE HYDROCHLORIDE 50 MG/ML
50 INJECTION INTRAMUSCULAR; INTRAVENOUS
Status: CANCELLED
Start: 2023-09-08

## 2023-09-08 RX ORDER — MEPERIDINE HYDROCHLORIDE 25 MG/ML
25 INJECTION INTRAMUSCULAR; INTRAVENOUS; SUBCUTANEOUS EVERY 30 MIN PRN
Status: CANCELLED | OUTPATIENT
Start: 2023-09-08

## 2023-09-08 RX ADMIN — ZOLEDRONIC ACID 5 MG: 0.05 INJECTION, SOLUTION INTRAVENOUS at 09:57

## 2023-09-08 RX ADMIN — Medication 250 ML: at 09:55

## 2023-09-08 NOTE — PROGRESS NOTES
Infusion Nursing Note:  Bree Carrillo presents today for Reclast.    Patient seen by provider today: No   present during visit today: Not Applicable.    Note: Patient reports she is feeling well overall today, does not recall having trouble with previous infusions.    Patient denies dental issues at this time.  States last dental exam was 2 months ago.  Patient will inform dentist that she got Reclast infusion, prior to any invasive dental work in the future.    Reminded patient to drink 6-8 glasses of water today, and tomorrow, to protect kidneys.    Intravenous Access:  Peripheral IV placed.    Treatment Conditions:  Lab Results   Component Value Date     12/13/2022    POTASSIUM 4.0 12/13/2022    CR 0.93 12/13/2022    DEVAN 9.2 12/13/2022    BILITOTAL 0.5 12/13/2022    ALBUMIN 3.7 12/13/2022    ALT 24 12/13/2022    AST 19 12/13/2022       Results reviewed, labs MET treatment parameters, ok to proceed with treatment.    Post Infusion Assessment:  Patient tolerated infusion without incident.  Site patent and intact, free from redness, edema or discomfort.  No evidence of extravasations.  Access discontinued per protocol.     Discharge Plan:   Future appts have been reviewed and crosschecked with appt note and plan.  AVS to patient via [employer] and printed out and reviewed with patient.  Patient will return in 1 year for next appointment.   Patient discharged in stable condition accompanied by: .  Departure Mode: Ambulatory.      Ary Marrero RN BSN OCN

## 2023-09-08 NOTE — RESULT ENCOUNTER NOTE
Your blood sugar and kidney function testing is normal.  Please call or MyChart message me if you have any questions.      DUSTYK

## 2023-09-08 NOTE — PATIENT INSTRUCTIONS
Make your dentist aware that you have received this medication.     Remember to drink plenty of water (6-8 glasses) today and tomorrow to flush the medication through your kidneys.     It is recommended to take calcium and vitamin D while receiving this medication. The recommended dosage is 500-600 of elemental calcium and 400 units of vitamin D.

## 2023-11-13 ENCOUNTER — PATIENT OUTREACH (OUTPATIENT)
Dept: CARE COORDINATION | Facility: CLINIC | Age: 73
End: 2023-11-13
Payer: COMMERCIAL

## 2023-11-17 ENCOUNTER — TELEPHONE (OUTPATIENT)
Dept: FAMILY MEDICINE | Facility: CLINIC | Age: 73
End: 2023-11-17

## 2023-11-17 NOTE — TELEPHONE ENCOUNTER
Reason for Call:  Appointment Request    Patient requesting this type of appt: Pre-op    Requested provider:  Marcela     Reason patient unable to be scheduled: Not within requested timeframe    When does patient want to be seen/preferred time: Before Dec 8    Comments: Pt is getting eyes surgery coming up. One on 12/8/2023 and another is 1/15/2024.    Could we send this information to you in [employer] or would you prefer to receive a phone call?:   Patient would prefer a phone call   Okay to leave a detailed message?: Yes at Home number on file [phone] (home) or Cell number on file:      Telephone Information:   Mobile [phone]       Call taken on 11/17/2023 at 11:53 AM by Kimberly Zhong

## 2023-11-20 NOTE — TELEPHONE ENCOUNTER
Please call patient re:  preop appointment. - ok to schedule at 11:40 on 12/4 with me for preop.      DUSTYK

## 2023-11-24 ENCOUNTER — PATIENT OUTREACH (OUTPATIENT)
Dept: CARE COORDINATION | Facility: CLINIC | Age: 73
End: 2023-11-24
Payer: COMMERCIAL

## 2023-11-27 ENCOUNTER — PATIENT OUTREACH (OUTPATIENT)
Dept: CARE COORDINATION | Facility: CLINIC | Age: 73
End: 2023-11-27
Payer: COMMERCIAL

## 2023-12-04 ENCOUNTER — OFFICE VISIT (OUTPATIENT)
Dept: FAMILY MEDICINE | Facility: CLINIC | Age: 73
End: 2023-12-04
Payer: COMMERCIAL

## 2023-12-04 VITALS
DIASTOLIC BLOOD PRESSURE: 71 MMHG | RESPIRATION RATE: 14 BRPM | SYSTOLIC BLOOD PRESSURE: 130 MMHG | BODY MASS INDEX: 24.81 KG/M2 | TEMPERATURE: 97.7 F | WEIGHT: 110.3 LBS | HEIGHT: 56 IN | OXYGEN SATURATION: 100 % | HEART RATE: 74 BPM

## 2023-12-04 DIAGNOSIS — E78.5 HYPERLIPIDEMIA WITH TARGET LDL LESS THAN 130: ICD-10-CM

## 2023-12-04 DIAGNOSIS — H25.9 AGE-RELATED CATARACT OF BOTH EYES, UNSPECIFIED AGE-RELATED CATARACT TYPE: ICD-10-CM

## 2023-12-04 DIAGNOSIS — I10 BENIGN ESSENTIAL HYPERTENSION: ICD-10-CM

## 2023-12-04 DIAGNOSIS — Z12.31 VISIT FOR SCREENING MAMMOGRAM: ICD-10-CM

## 2023-12-04 DIAGNOSIS — Z01.818 PREOP GENERAL PHYSICAL EXAM: Primary | ICD-10-CM

## 2023-12-04 PROCEDURE — 99214 OFFICE O/P EST MOD 30 MIN: CPT | Performed by: FAMILY MEDICINE

## 2023-12-04 RX ORDER — RESPIRATORY SYNCYTIAL VIRUS VACCINE 120MCG/0.5
0.5 KIT INTRAMUSCULAR ONCE
Qty: 1 EACH | Refills: 0 | Status: CANCELLED | OUTPATIENT
Start: 2023-12-04 | End: 2023-12-04

## 2023-12-04 ASSESSMENT — PAIN SCALES - GENERAL: PAINLEVEL: NO PAIN (0)

## 2023-12-04 NOTE — PATIENT INSTRUCTIONS
Proceed with surgery as planned.   Continue the current medication prior to surgery.    You are a candidate for RSV vaccine - this is better covered by insurance at the pharmacy.             Preparing for Your Surgery  Getting started  A nurse will call you to review your health history and instructions. They will give you an arrival time based on your scheduled surgery time. Please be ready to share:  Your doctor's clinic name and phone number  Your medical, surgical, and anesthesia history  A list of allergies and sensitivities  A list of medicines, including herbal treatments and over-the-counter drugs  Whether the patient has a legal guardian (ask how to send us the papers in advance)  Please tell us if you're pregnant--or if there's any chance you might be pregnant. Some surgeries may injure a fetus (unborn baby), so they require a pregnancy test. Surgeries that are safe for a fetus don't always need a test, and you can choose whether to have one.   If you have a child who's having surgery, please ask for a copy of Preparing for Your Child's Surgery.    Preparing for surgery  Within 10 to 30 days of surgery: Have a pre-op exam (sometimes called an H&P, or History and Physical). This can be done at a clinic or pre-operative center.  If you're having a , you may not need this exam. Talk to your care team.  At your pre-op exam, talk to your care team about all medicines you take. If you need to stop any medicines before surgery, ask when to start taking them again.  We do this for your safety. Many medicines can make you bleed too much during surgery. Some change how well surgery (anesthesia) drugs work.  Call your insurance company to let them know you're having surgery. (If you don't have insurance, call [phone].)  Call your clinic if there's any change in your health. This includes signs of a cold or flu (sore throat, runny nose, cough, rash, fever). It also includes a scrape or scratch near the  surgery site.  If you have questions on the day of surgery, call your hospital or surgery center.  Eating and drinking guidelines  For your safety: Unless your surgeon tells you otherwise, follow the guidelines below.  Eat and drink as usual until 8 hours before you arrive for surgery. After that, no food or milk.  Drink clear liquids until 2 hours before you arrive. These are liquids you can see through, like water, Gatorade, and Propel Water. They also include plain black coffee and tea (no cream or milk), candy, and breath mints. You can spit out gum when you arrive.  If you drink alcohol: Stop drinking it the night before surgery.  If your care team tells you to take medicine on the morning of surgery, it's okay to take it with a sip of water.  Preventing infection  Shower or bathe the night before and morning of your surgery. Follow the instructions your clinic gave you. (If no instructions, use regular soap.)  Don't shave or clip hair near your surgery site. We'll remove the hair if needed.  Don't smoke or vape the morning of surgery. You may chew nicotine gum up to 2 hours before surgery. A nicotine patch is okay.  Note: Some surgeries require you to completely quit smoking and nicotine. Check with your surgeon.  Your care team will make every effort to keep you safe from infection. We will:  Clean our hands often with soap and water (or an alcohol-based hand rub).  Clean the skin at your surgery site with a special soap that kills germs.  Give you a special gown to keep you warm. (Cold raises the risk of infection.)  Wear special hair covers, masks, gowns and gloves during surgery.  Give antibiotic medicine, if prescribed. Not all surgeries need antibiotics.  What to bring on the day of surgery  Photo ID and insurance card  Copy of your health care directive, if you have one  Glasses and hearing aids (bring cases)  You can't wear contacts during surgery  Inhaler and eye drops, if you use them (tell us about  these when you arrive)  CPAP machine or breathing device, if you use them  A few personal items, if spending the night  If you have . . .  A pacemaker, ICD (cardiac defibrillator) or other implant: Bring the ID card.  An implanted stimulator: Bring the remote control.  A legal guardian: Bring a copy of the certified (court-stamped) guardianship papers.  Please remove any jewelry, including body piercings. Leave jewelry and other valuables at home.  If you're going home the day of surgery  You must have a responsible adult drive you home. They should stay with you overnight as well.  If you don't have someone to stay with you, and you aren't safe to go home alone, we may keep you overnight. Insurance often won't pay for this.  After surgery  If it's hard to control your pain or you need more pain medicine, please call your surgeon's office.  Questions?   If you have any questions for your care team, list them here: _________________________________________________________________________________________________________________________________________________________________________ ____________________________________ ____________________________________ ____________________________________  For informational purposes only. Not to replace the advice of your health care provider. Copyright   2003, 2019 [facility]. All rights reserved. Clinically reviewed by Elda Wright MD. [employer] [id number] - REV 12/22.    How to Take Your Medication Before Surgery  - Take all of your medications before surgery as usual

## 2023-12-04 NOTE — PROGRESS NOTES
[address] [address]  Phone: [phone]  Primary Provider: Tobias Carlos  Pre-op Performing Provider: JOSE ROBERTO CARPIO      PREOPERATIVE EVALUATION:  Today's date: 12/4/2023    Bree is a 73 year old, presenting for the following:  Pre-Op Exam        Surgical Information:  Surgery/Procedure: cataract   Surgery Location: [facility] in [place]   Surgeon: Dr. Adam Castro  Surgery Date: 12/8/2023 & 12/15/2023  Time of Surgery: n/a  Where patient plans to recover: At home with family  Fax number for surgical facility: [phone]    Assessment & Plan     The proposed surgical procedure is considered LOW risk.    Preop general physical exam  Age-related cataract of both eyes, unspecified age-related cataract type  Proceed with surgery as planned    Benign essential hypertension  Blood pressure under good control.  Continue losartan hydrochlorothiazide perioperatively.    Hyperlipidemia with target LDL less than 130  Cholesterol controlled with atorvastatin use.  Continue perioperatively    Visit for screening mammogram  Mammogram is due and orders given  - MA SCREENING DIGITAL BILAT - Future  (s+30); Future           Risks and Recommendations:  The patient has the following additional risks and recommendations for perioperative complications:   - No identified additional risk factors other than previously addressed    Antiplatelet or Anticoagulation Medication Instructions:   - Patient is on no antiplatelet or anticoagulation medications.    Additional Medication Instructions:  Patient is to take all scheduled medications on the day of surgery    RECOMMENDATION:  APPROVAL GIVEN to proceed with proposed procedure, without further diagnostic evaluation.    Prescription drug management        Subjective       HPI related to upcoming procedure: 73-year-old with bilateral cataracts plan for surgical treatment.          12/4/2023    11:28 AM    Preop Questions   1. Have you ever had a heart attack or stroke? No   2. Have you ever had surgery on your heart or blood vessels, such as a stent placement, a coronary artery bypass, or surgery on an artery in your head, neck, heart, or legs? No   3. Do you have chest pain with activity? No   4. Do you have a history of  heart failure? No   5. Do you currently have a cold, bronchitis or symptoms of other infection? No   6. Do you have a cough, shortness of breath, or wheezing? No   7. Do you or anyone in your family have previous history of blood clots? No   8. Do you or does anyone in your family have a serious bleeding problem such as prolonged bleeding following surgeries or cuts? No   9. Have you ever had problems with anemia or been told to take iron pills? No   10. Have you had any abnormal blood loss such as black, tarry or bloody stools, or abnormal vaginal bleeding? No   11. Have you ever had a blood transfusion? No   12. Are you willing to have a blood transfusion if it is medically needed before, during, or after your surgery? NO -    13. Have you or any of your relatives ever had problems with anesthesia? No   14. Do you have sleep apnea, excessive snoring or daytime drowsiness? No   15. Do you have any artifical heart valves or other implanted medical devices like a pacemaker, defibrillator, or continuous glucose monitor? No   16. Do you have artificial joints? No   17. Are you allergic to latex? No       Health Care Directive:  Patient does not have a Health Care Directive or Living Will: Discussed advance care planning with patient; information given to patient to review.    Preoperative Review of :   reviewed - no record of controlled substances prescribed.      Status of Chronic Conditions:  See problem list for active medical problems.  Problems all longstanding and stable, except as noted/documented.  See ROS for pertinent symptoms related to these conditions.    Review of  Systems  CONSTITUTIONAL: NEGATIVE for fever, chills, change in weight  INTEGUMENTARY/SKIN: NEGATIVE for worrisome rashes, moles or lesions  ENT/MOUTH: NEGATIVE for ear, mouth and throat problems  RESP: NEGATIVE for significant cough or SOB  CV: NEGATIVE for chest pain, palpitations or peripheral edema  GI: NEGATIVE for nausea, abdominal pain, heartburn, or change in bowel habits  : NEGATIVE for frequency, dysuria, or hematuria  MUSCULOSKELETAL: NEGATIVE for significant arthralgias or myalgia  NEURO: NEGATIVE for weakness, dizziness or paresthesias  ENDOCRINE: NEGATIVE for temperature intolerance, skin/hair changes  HEME: NEGATIVE for bleeding problems  PSYCHIATRIC: NEGATIVE for changes in mood or affect    Patient Active Problem List    Diagnosis Date Noted    Combined form of age-related cataract, both eyes 08/03/2023     Priority: Medium    Lipoma of skin and subcutaneous tissue 10/19/2022     Priority: Medium    Onychomycosis 10/27/2021     Priority: Medium    Adverse effect of other drugs, medicaments and biological substances, sequela 11/20/2018     Priority: Medium    Senile osteoporosis 11/09/2018     Priority: Medium    GI bleed 09/08/2014     Priority: Medium    Iron deficiency anemia 09/08/2014     Priority: Medium     Overview:   Hgb ~ 11 in 2012  Hgb 10.6 in 2013  Hbg 7.6 on 8/29/14    Iron studies 8/29/14     - Ferritin 2.5, iron 17, 4% sat, TIBC 449  Peripheral blood smear 8/30/14:     - Microcytic, hypochromic, anemia c/w CARON    EGD 9/9/14: unremarkable     - Duodenal biopsies:          - Normal small bowel mucosa          - No evidence of celiac disease or other enteropathy      Closed right cuboid fracture 07/21/2014     Priority: Medium    Right foot pain 03/05/2014     Priority: Medium    Falls 01/31/2014     Priority: Medium    Osteoporosis 10/22/2013     Priority: Medium    GERD (gastroesophageal reflux disease) 08/29/2012     Priority: Medium    Anemia 02/09/2012     Priority: Medium     Vitamin D deficiency 02/09/2012     Priority: Medium    Benign essential hypertension 01/26/2012     Priority: Medium    Hyperlipidemia with target LDL less than 130 05/17/2004     Priority: Medium      Past Medical History:   Diagnosis Date    Hypertension      Past Surgical History:   Procedure Laterality Date    LASIK BILATERAL Bilateral 2012    Dr Chavez     Current Outpatient Medications   Medication Sig Dispense Refill    atorvastatin (LIPITOR) 20 MG tablet Take 1 tablet (20 mg) by mouth every evening 90 tablet 3    losartan-hydrochlorothiazide (HYZAAR) 100-25 MG tablet Take 1 tablet by mouth every morning 90 tablet 3    carboxymethylcellulose PF (CARBOXYMETHYLCELLULOSE SODIUM) 0.5 % ophthalmic solution Place 1 drop into both eyes 4 times daily Preservative free artificial tears, single use vials. (Patient not taking: Reported on 9/8/2023) 400 each 11    Cholecalciferol (VITAMIN D3) 1.25 MG (79301 UT) TABS       ferrous fumarate 65 mg, [place]. FE,-Vitamin C 125 mg (VITRON C)  MG TABS tablet Take 1 tablet by mouth On Monday, Wednesday and Friday         Allergies   Allergen Reactions    Iohexol Hives     Pt experienced sneeze, itching followed by hives post 100cc Omni 350 for CT Scan.   Observed with IV in place x 45 minutes.   Given 25mg PO Benadryl with OK for 50mg to be taken every 8 hours for the next 24 hours per Dr Rausch.     S        Social History     Tobacco Use    Smoking status: Never    Smokeless tobacco: Never   Substance Use Topics    Alcohol use: No     Family History   Problem Relation Age of Onset    Hypertension Mother     Hypertension Father     No Known Problems Sister     Coronary Artery Disease Brother     No Known Problems Maternal Grandmother     No Known Problems Maternal Grandfather     No Known Problems Paternal Grandmother     No Known Problems Paternal Grandfather     No Known Problems Daughter     No Known Problems Son     No Known Problems Maternal Half-Brother     No Known  "Problems Maternal Half-Sister     No Known Problems Paternal Half-Brother     No Known Problems Paternal Half-Sister     No Known Problems Niece     No Known Problems Nephew     No Known Problems Cousin     No Known Problems Other     Diabetes No family hx of     Hyperlipidemia No family hx of     Cerebrovascular Disease No family hx of     Breast Cancer No family hx of     Colon Cancer No family hx of     Prostate Cancer No family hx of     Other Cancer No family hx of     Depression No family hx of     Anxiety Disorder No family hx of     Mental Illness No family hx of     Substance Abuse No family hx of     Anesthesia Reaction No family hx of     Asthma No family hx of     Osteoporosis No family hx of     Genetic Disorder No family hx of     Thyroid Disease No family hx of     Obesity No family hx of     Unknown/Adopted No family hx of     Glaucoma No family hx of     Macular Degeneration No family hx of      History   Drug Use No         Objective     /71 (BP Location: Right arm, Patient Position: Sitting, Cuff Size: Adult Regular)   Pulse 74   Temp 97.7  F (36.5  C) (Temporal)   Resp 14   Ht 1.41 m (4' 7.5\")   Wt 50 kg (110 lb 4.8 oz)   SpO2 100%   BMI 25.18 kg/m      Physical Exam    GENERAL APPEARANCE: healthy, alert and no distress     HENT: ear canals and TM's normal and nose and mouth without ulcers or lesions     NECK: no adenopathy, no asymmetry, masses, or scars and thyroid normal to palpation     RESP: lungs clear to auscultation - no rales, rhonchi or wheezes     CV: regular rates and rhythm, normal S1 S2, no S3 or S4 and no murmur, click or rub     ABDOMEN:  soft, nontender, no HSM or masses and bowel sounds normal     MS: extremities normal- no gross deformities noted, no evidence of inflammation in joints, FROM in all extremities.     SKIN: no suspicious lesions or rashes     NEURO: Normal strength and tone, sensory exam grossly normal, mentation intact and speech normal     PSYCH: " mentation appears normal. and affect normal/bright     LYMPHATICS: No cervical adenopathy    Recent Labs   Lab Test 09/08/23  0906 02/13/23  1615 12/13/22  1149   HGB  --  10.9* 10.7*   PLT  --  315 260     --  139   POTASSIUM 3.7  --  4.0   CR 0.97*  0.97*  --  0.93        Diagnostics:  No labs were ordered during this visit.   No EKG required for low risk surgery (cataract, skin procedure, breast biopsy, etc).    Revised Cardiac Risk Index (RCRI):  The patient has the following serious cardiovascular risks for perioperative complications:   - No serious cardiac risks = 0 points     RCRI Interpretation: 0 points: Class I (very low risk - 0.4% complication rate)         Signed Electronically by: Sylvie Medrano MD  Copy of this evaluation report is provided to requesting physician.

## 2023-12-22 ENCOUNTER — PATIENT OUTREACH (OUTPATIENT)
Dept: CARE COORDINATION | Facility: CLINIC | Age: 73
End: 2023-12-22
Payer: COMMERCIAL

## 2023-12-28 ENCOUNTER — PATIENT OUTREACH (OUTPATIENT)
Dept: CARE COORDINATION | Facility: CLINIC | Age: 73
End: 2023-12-28
Payer: COMMERCIAL

## 2024-01-01 ENCOUNTER — TRANSFERRED RECORDS (OUTPATIENT)
Dept: MULTI SPECIALTY CLINIC | Facility: CLINIC | Age: 74
End: 2024-01-01

## 2024-01-01 LAB — RETINOPATHY: NORMAL

## 2024-01-11 ENCOUNTER — PATIENT OUTREACH (OUTPATIENT)
Dept: CARE COORDINATION | Facility: CLINIC | Age: 74
End: 2024-01-11
Payer: COMMERCIAL

## 2024-01-30 DIAGNOSIS — E78.5 HYPERLIPIDEMIA LDL GOAL <100: ICD-10-CM

## 2024-01-30 DIAGNOSIS — I10 BENIGN ESSENTIAL HYPERTENSION: ICD-10-CM

## 2024-01-30 RX ORDER — LOSARTAN POTASSIUM AND HYDROCHLOROTHIAZIDE 25; 100 MG/1; MG/1
1 TABLET ORAL EVERY MORNING
Qty: 90 TABLET | Refills: 0 | Status: SHIPPED | OUTPATIENT
Start: 2024-01-30 | End: 2024-02-16

## 2024-01-30 RX ORDER — LOSARTAN POTASSIUM AND HYDROCHLOROTHIAZIDE 25; 100 MG/1; MG/1
1 TABLET ORAL EVERY MORNING
Qty: 90 TABLET | Refills: 0 | Status: CANCELLED | OUTPATIENT
Start: 2024-01-30

## 2024-01-30 RX ORDER — ATORVASTATIN CALCIUM 20 MG/1
20 TABLET, FILM COATED ORAL EVERY EVENING
Qty: 30 TABLET | Refills: 0 | Status: SHIPPED | OUTPATIENT
Start: 2024-01-30 | End: 2024-02-16

## 2024-01-30 NOTE — TELEPHONE ENCOUNTER
Labs due for cholesterol refill. Refill sent.  Follow up needed for additional refills - message on script.     DUSTYK

## 2024-01-30 NOTE — TELEPHONE ENCOUNTER
Medication Question or Refill        What medication are you calling about (include dose and sig)?:   Hyzaar   Lipitor 20MG     Preferred Pharmacy:  [id number] - [place], [place] - [address]  [address]  [place] [address]  Phone: [phone] Fax: [phone]      Controlled Substance Agreement on file:   CSA -- Patient Level:    CSA: None found at the patient level.       Who prescribed the medication?: Sylvie Medrano     Do you need a refill? Yes

## 2024-02-02 ENCOUNTER — TELEPHONE (OUTPATIENT)
Dept: FAMILY MEDICINE | Facility: CLINIC | Age: 74
End: 2024-02-02
Payer: COMMERCIAL

## 2024-02-02 NOTE — TELEPHONE ENCOUNTER
Ok for same day appointment - is she looking for a preop visit or a regular physical?  Please clarify this for her when scheduling.    GILL

## 2024-02-02 NOTE — TELEPHONE ENCOUNTER
Pt called needing physical Apt Pt needs Apt this month she will be out of town next month. Pt is also requesting EKG. Sending to PCP for same day approval.             Margarita ARZATE Visit Facilitator

## 2024-02-05 ENCOUNTER — TELEPHONE (OUTPATIENT)
Dept: ENDOCRINOLOGY | Facility: CLINIC | Age: 74
End: 2024-02-05
Payer: COMMERCIAL

## 2024-02-05 DIAGNOSIS — M81.0 OSTEOPOROSIS, UNSPECIFIED OSTEOPOROSIS TYPE, UNSPECIFIED PATHOLOGICAL FRACTURE PRESENCE: Primary | ICD-10-CM

## 2024-02-05 NOTE — TELEPHONE ENCOUNTER
"Patient was last seen by Dr. Lundberg on 7/28/23. Writer called patient to to gather more information. Patient's  answered and noted he is not with patient right now but stated that she needs refills. Writer reached out to patient. Patient had questions first about her losartan and atorvastatin. Writer recommended patient contact her primary care provider about those refills. Patient is scheduled for her yearly physical next week with primary care.     Patient wanted to know if there is any medications she should be taking for Dr. Lundberg. As per 7/28/23 progress note from Dr. Lundberg patient was changing from Prolia to Relcast and was no longer taking levothyroxine. Patient verbalizes understanding. Patient had Relcast on 9/8/23.     Patient questioned when she is due for her DEXA scan. Per 7/28/23 progress note from Dr. Lundberg, patient was to have DEXA scan in spring of 2024. Last DEXA 4/22/22. Currently no orders in Epic. Will send to Dr. Lundberg to place order. Writer will then have scheduling contact patient to arrange.    Also per 7/28/23 progress note,\"- continue Ca supplement 1 tab Mon/Wed/Fri she has abdominal symptoms and constipation.\"    Patient states that she is not taking calcium and would like to know which one she should . Patient asks that a prescription be sent to pharmacy and a [employer] message be sent when reviewed by Dr. Lundberg.    Patient is also scheduled for follow-up with Dr. Lundberg in July as planned.         Yoselin Meza RN  Endocrine Care Coordinator  [facility]    "

## 2024-02-05 NOTE — TELEPHONE ENCOUNTER
[facility] Call Center    Phone Message    May a detailed message be left on voicemail: yes     Reason for Call: Other: Per pt would like to know when she should and which medication she should take when she eats. Per pt does not remember. Please call pt back. Thank you!     Action Taken: Message routed to:  [facility] ENDO    Travel Screening: Not Applicable

## 2024-02-09 NOTE — TELEPHONE ENCOUNTER
"Per Dr. Lundberg:    Which Ca supplment (exactly) made her constipation (product name)?     Also DEXA ordered.           Writer contacted patient. Advised patient of recommendations above. Patient verbalizes understanding. Patient will will plan to schedule DEXA scan after 4/21/24. Patient does not remember what she was taking for calcium but asks that a [employer] message be sent to her with recommendations.    Per 7/28/23 progress note from Dr. Lundberg, \"Taking citracal petite 2 tab but started to have bothering GI and stopped .\"    Writer reviewed with Dr. Lundberg. Patient can take citracal petite 1 tablet Monday/Wednesday/Friday.     Writer will send agnion [employer] message to patient with update.       Yoselin Meza RN  Endocrine Care Coordinator  [facility]    "

## 2024-02-11 ENCOUNTER — HEALTH MAINTENANCE LETTER (OUTPATIENT)
Age: 74
End: 2024-02-11

## 2024-02-15 ENCOUNTER — OFFICE VISIT (OUTPATIENT)
Dept: FAMILY MEDICINE | Facility: CLINIC | Age: 74
End: 2024-02-15
Payer: COMMERCIAL

## 2024-02-15 VITALS
WEIGHT: 112 LBS | OXYGEN SATURATION: 100 % | TEMPERATURE: 97.6 F | DIASTOLIC BLOOD PRESSURE: 81 MMHG | RESPIRATION RATE: 16 BRPM | HEIGHT: 55 IN | BODY MASS INDEX: 25.92 KG/M2 | SYSTOLIC BLOOD PRESSURE: 130 MMHG | HEART RATE: 68 BPM

## 2024-02-15 DIAGNOSIS — M81.0 SENILE OSTEOPOROSIS: ICD-10-CM

## 2024-02-15 DIAGNOSIS — R55 PRE-SYNCOPE: ICD-10-CM

## 2024-02-15 DIAGNOSIS — E78.5 HYPERLIPIDEMIA LDL GOAL <100: ICD-10-CM

## 2024-02-15 DIAGNOSIS — D64.9 ANEMIA, UNSPECIFIED TYPE: ICD-10-CM

## 2024-02-15 DIAGNOSIS — Z12.11 COLON CANCER SCREENING: ICD-10-CM

## 2024-02-15 DIAGNOSIS — E78.5 HYPERLIPIDEMIA WITH TARGET LDL LESS THAN 130: ICD-10-CM

## 2024-02-15 DIAGNOSIS — I10 BENIGN ESSENTIAL HYPERTENSION: ICD-10-CM

## 2024-02-15 DIAGNOSIS — Z00.00 ENCOUNTER FOR MEDICARE ANNUAL WELLNESS EXAM: Primary | ICD-10-CM

## 2024-02-15 DIAGNOSIS — L65.9 HAIR LOSS: ICD-10-CM

## 2024-02-15 LAB
ALBUMIN SERPL BCG-MCNC: 4.2 G/DL (ref 3.5–5.2)
ALP SERPL-CCNC: 79 U/L (ref 40–150)
ALT SERPL W P-5'-P-CCNC: 27 U/L (ref 0–50)
ANION GAP SERPL CALCULATED.3IONS-SCNC: 10 MMOL/L (ref 7–15)
AST SERPL W P-5'-P-CCNC: 27 U/L (ref 0–45)
BILIRUB SERPL-MCNC: 0.5 MG/DL
BUN SERPL-MCNC: 27.9 MG/DL (ref 8–23)
CALCIUM SERPL-MCNC: 9.4 MG/DL (ref 8.8–10.2)
CHLORIDE SERPL-SCNC: 103 MMOL/L (ref 98–107)
CHOLEST SERPL-MCNC: 173 MG/DL
CREAT SERPL-MCNC: 0.95 MG/DL (ref 0.51–0.95)
CREAT UR-MCNC: 137 MG/DL
DEPRECATED HCO3 PLAS-SCNC: 26 MMOL/L (ref 22–29)
EGFRCR SERPLBLD CKD-EPI 2021: 63 ML/MIN/1.73M2
ERYTHROCYTE [DISTWIDTH] IN BLOOD BY AUTOMATED COUNT: 12.7 % (ref 10–15)
FASTING STATUS PATIENT QL REPORTED: YES
FERRITIN SERPL-MCNC: 45 NG/ML (ref 11–328)
GLUCOSE SERPL-MCNC: 95 MG/DL (ref 70–99)
HCT VFR BLD AUTO: 38.7 % (ref 35–47)
HDLC SERPL-MCNC: 78 MG/DL
HGB BLD-MCNC: 11.9 G/DL (ref 11.7–15.7)
LDLC SERPL CALC-MCNC: 82 MG/DL
MCH RBC QN AUTO: 29.8 PG (ref 26.5–33)
MCHC RBC AUTO-ENTMCNC: 30.7 G/DL (ref 31.5–36.5)
MCV RBC AUTO: 97 FL (ref 78–100)
MICROALBUMIN UR-MCNC: <12 MG/L
MICROALBUMIN/CREAT UR: NORMAL MG/G{CREAT}
NONHDLC SERPL-MCNC: 95 MG/DL
PLATELET # BLD AUTO: 241 10E3/UL (ref 150–450)
POTASSIUM SERPL-SCNC: 4.3 MMOL/L (ref 3.4–5.3)
PROT SERPL-MCNC: 7.6 G/DL (ref 6.4–8.3)
RBC # BLD AUTO: 3.99 10E6/UL (ref 3.8–5.2)
SODIUM SERPL-SCNC: 139 MMOL/L (ref 135–145)
TRIGL SERPL-MCNC: 65 MG/DL
TSH SERPL DL<=0.005 MIU/L-ACNC: 3 UIU/ML (ref 0.3–4.2)
VIT B12 SERPL-MCNC: 554 PG/ML (ref 232–1245)
WBC # BLD AUTO: 4.4 10E3/UL (ref 4–11)

## 2024-02-15 PROCEDURE — 85027 COMPLETE CBC AUTOMATED: CPT | Performed by: FAMILY MEDICINE

## 2024-02-15 PROCEDURE — 80061 LIPID PANEL: CPT | Performed by: FAMILY MEDICINE

## 2024-02-15 PROCEDURE — 82570 ASSAY OF URINE CREATININE: CPT | Performed by: FAMILY MEDICINE

## 2024-02-15 PROCEDURE — 99214 OFFICE O/P EST MOD 30 MIN: CPT | Mod: 25 | Performed by: FAMILY MEDICINE

## 2024-02-15 PROCEDURE — 93000 ELECTROCARDIOGRAM COMPLETE: CPT | Performed by: FAMILY MEDICINE

## 2024-02-15 PROCEDURE — 36415 COLL VENOUS BLD VENIPUNCTURE: CPT | Performed by: FAMILY MEDICINE

## 2024-02-15 PROCEDURE — G0439 PPPS, SUBSEQ VISIT: HCPCS | Performed by: FAMILY MEDICINE

## 2024-02-15 PROCEDURE — 84443 ASSAY THYROID STIM HORMONE: CPT | Performed by: FAMILY MEDICINE

## 2024-02-15 PROCEDURE — 82043 UR ALBUMIN QUANTITATIVE: CPT | Performed by: FAMILY MEDICINE

## 2024-02-15 PROCEDURE — 82728 ASSAY OF FERRITIN: CPT | Performed by: FAMILY MEDICINE

## 2024-02-15 PROCEDURE — 80053 COMPREHEN METABOLIC PANEL: CPT | Performed by: FAMILY MEDICINE

## 2024-02-15 PROCEDURE — 82607 VITAMIN B-12: CPT | Performed by: FAMILY MEDICINE

## 2024-02-15 SDOH — HEALTH STABILITY: PHYSICAL HEALTH: ON AVERAGE, HOW MANY DAYS PER WEEK DO YOU ENGAGE IN MODERATE TO STRENUOUS EXERCISE (LIKE A BRISK WALK)?: 3 DAYS

## 2024-02-15 ASSESSMENT — PAIN SCALES - GENERAL: PAINLEVEL: NO PAIN (0)

## 2024-02-15 ASSESSMENT — SOCIAL DETERMINANTS OF HEALTH (SDOH): HOW OFTEN DO YOU GET TOGETHER WITH FRIENDS OR RELATIVES?: ONCE A WEEK

## 2024-02-15 NOTE — PATIENT INSTRUCTIONS
Labs today.  I will update refills when lab results return.    Verify you have had a tetanus vaccine at the pharmacy.  If not getting one before you visit new grandchild is recommended.  Mammogram as scheduled.  Colon cancer screening is due in September.  For the falling episodes, we will do the lab testing today.  If nothing as a cause for that is noted and symptoms of dizziness persist, evaluation with a heart monitor may be needed.  EKG today.  If you are in [place] and need a refill, they can transfer the script for the [place] [employer] to [facility] if you ask them to.         Preventive Care Advice   This is general advice given by our system to help you stay healthy. However, your care team may have specific advice just for you. Please talk to your care team about your preventive care needs.  Nutrition  Eat 5 or more servings of fruits and vegetables each day.  Try wheat bread, brown rice and whole grain pasta (instead of white bread, rice, and pasta).  Get enough calcium and vitamin D. Check the label on foods and aim for 100% of the RDA (recommended daily allowance).  Lifestyle  Exercise at least 150 minutes each week  (30 minutes a day, 5 days a week).  Do muscle strengthening activities 2 days a week. These help control your weight and prevent disease.  No smoking.  Wear sunscreen to prevent skin cancer.  Have a dental exam and cleaning every 6 months.  Yearly exams  See your health care team every year to talk about:  Any changes in your health.  Any medicines your care team has prescribed.  Preventive care, family planning, and ways to prevent chronic diseases.  Shots (vaccines)   HPV shots (up to age 26), if you've never had them before.  Hepatitis B shots (up to age 59), if you've never had them before.  COVID-19 shot: Get this shot when it's due.  Flu shot: Get a flu shot every year.  Tetanus shot: Get a tetanus shot every 10 years.  Pneumococcal, hepatitis A, and RSV shots: Ask your care team if you  need these based on your risk.  Shingles shot (for age 50 and up)  General health tests  Diabetes screening:  Starting at age 35, Get screened for diabetes at least every 3 years.  If you are younger than age 35, ask your care team if you should be screened for diabetes.  Cholesterol test: At age 39, start having a cholesterol test every 5 years, or more often if advised.  Bone density scan (DEXA): At age 50, ask your care team if you should have this scan for osteoporosis (brittle bones).  Hepatitis C: Get tested at least once in your life.  STIs (sexually transmitted infections)  Before age 24: Ask your care team if you should be screened for STIs.  After age 24: Get screened for STIs if you're at risk. You are at risk for STIs (including HIV) if:  You are sexually active with more than one person.  You don't use condoms every time.  You or a partner was diagnosed with a sexually transmitted infection.  If you are at risk for HIV, ask about PrEP medicine to prevent HIV.  Get tested for HIV at least once in your life, whether you are at risk for HIV or not.  Cancer screening tests  Cervical cancer screening: If you have a cervix, begin getting regular cervical cancer screening tests starting at age 21.  Breast cancer scan (mammogram): If you've ever had breasts, begin having regular mammograms starting at age 40. This is a scan to check for breast cancer.  Colon cancer screening: It is important to start screening for colon cancer at age 45.  Have a colonoscopy test every 10 years (or more often if you're at risk) Or, ask your provider about stool tests like a FIT test every year or Cologuard test every 3 years.  To learn more about your testing options, visit:   https://www.[employer]/545579.pdf.  For help making a decision, visit:   https://bit.ly/sy35125.  Prostate cancer screening test: If you have a prostate, ask your care team if a prostate cancer screening test (PSA) at age 55 is right for you.  Lung cancer  screening: If you are a current or former smoker ages 50 to 80, ask your care team if ongoing lung cancer screenings are right for you.  For informational purposes only. Not to replace the advice of your health care provider. Copyright   2023 [facility]. All rights reserved. Clinically reviewed by the [facility] Transitions Program. [employer] 033746 - REV 01/24.    Preventing Falls: Care Instructions  Injuries and health problems such as trouble walking or poor eyesight can increase your risk of falling. So can some medicines. But there are things you can do to help prevent falls. You can exercise to get stronger. You can also arrange your home to make it safer.    Talk to your doctor about the medicines you take. Ask if any of them increase the risk of falls and whether they can be changed or stopped.   Try to exercise regularly. It can help improve your strength and balance. This can help lower your risk of falling.     Practice fall safety and prevention.    Wear low-heeled shoes that fit well and give your feet good support. Talk to your doctor if you have foot problems that make this hard.  Carry a cellphone or wear a medical alert device that you can use to call for help.  Use stepladders instead of chairs to reach high objects. Don't climb if you're at risk for falls. Ask for help, if needed.  Wear the correct eyeglasses, if you need them.    Make your home safer.    Remove rugs, cords, clutter, and furniture from walkways.  Keep your house well lit. Use night-lights in hallways and bathrooms.  Install and use sturdy handrails on stairways.  Wear nonskid footwear, even inside. Don't walk barefoot or in socks without shoes.    Be safe outside.    Use handrails, curb cuts, and ramps whenever possible.  Keep your hands free by using a shoulder bag or backpack.  Try to walk in well-lit areas. Watch out for uneven ground, changes in pavement, and debris.  Be careful in the winter. Walk on the  "grass or gravel when sidewalks are slippery. Use de-icer on steps and walkways. Add non-slip devices to shoes.    Put grab bars and nonskid mats in your shower or tub and near the toilet. Try to use a shower chair or bath bench when bathing.   Get into a tub or shower by putting in your weaker leg first. Get out with your strong side first. Have a phone or medical alert device in the bathroom with you.   Where can you learn more?  Go to https://www.[employer].net/patiented  Enter G117 in the search box to learn more about \"Preventing Falls: Care Instructions.\"  Current as of: July 18, 2023               Content Version: 13.8    1058-5497 [employer].   Care instructions adapted under license by your healthcare professional. If you have questions about a medical condition or this instruction, always ask your healthcare professional. [employer] disclaims any warranty or liability for your use of this information.      "

## 2024-02-15 NOTE — PROGRESS NOTES
Preventive Care Visit  [facility]  Sylvie Medrano MD, Family Medicine  Feb 15, 2024    Assessment & Plan     Encounter for Medicare annual wellness exam  Screening and preventative care discussed.  Reports she has had immunizations performed through the pharmacy.  She will verify what these are and forward those to me over MyChart for our records.      Benign essential hypertension  Blood pressure under good control with current treatment.  Losartan refill sent to the pharmacy.  She may be out of town when her next refill is due.  We discussed that she could have her prescription refill transferred from our pharmacy to the pharmacy near her daughter's home in [place] for filling.  - COMPREHENSIVE METABOLIC PANEL; Future  - Albumin Random Urine Quantitative with Creat Ratio; Future  - EKG 12-lead complete w/read - Clinics  - COMPREHENSIVE METABOLIC PANEL  - Albumin Random Urine Quantitative with Creat Ratio    Hyperlipidemia with target LDL less than 130  Cholesterol under good control with current medication.  Refills updated  - Lipid panel reflex to direct LDL Non-fasting; Future  - Lipid panel reflex to direct LDL Non-fasting    Anemia, unspecified type  Normal blood counts and iron stores noted.  Normal B12.  Continue current management  - CBC with Platelets; Future  - Ferritin; Future  - Vitamin B12; Future  - CBC with Platelets  - Ferritin  - Vitamin B12    Colon cancer screening  Last colonoscopy done in 2014.  She is due now and a referral was sent  - Colonoscopy Screening  Referral; Future    Pre-syncope  Reports the symptoms most when she had not eaten.  EKG with right bundle branch block noted.  I was able to review the chart and found EKGs from outside our system that were done in 2010 and 2014 which were normal.  No other comparison EKGs are available.  Additional evaluation with monitor testing is recommended to assess for ectopy/irregular heartbeat as a contributing  "cause for her symptoms.  Will reach out to her via rubberitt to discuss recommendations.  - EKG 12-lead complete w/read - Clinics    Hair loss  Normal appearance of the scalp.  Thyroid function is normal.  Follow-up with persistent symptoms  - TSH with free T4 reflex; Future  - TSH with free T4 reflex    Senile osteoporosis  Ongoing care with endocrinology.  Recently changed from Prolia to Reclast due to cost.  Follow-up DEXA after 4/21/2024 planned per endocrinology.    Ordering of each unique test  Prescription drug management        BMI  Estimated body mass index is 26.03 kg/m  as calculated from the following:    Height as of this encounter: 1.397 m (4' 7\").    Weight as of this encounter: 50.8 kg (112 lb).       Counseling  Appropriate preventive services were discussed with this patient, including applicable screening as appropriate for fall prevention, nutrition, physical activity, Tobacco-use cessation, weight loss and cognition.  Checklist reviewing preventive services available has been given to the patient.  Reviewed patient's diet, addressing concerns and/or questions.   She is at risk for lack of exercise and has been provided with information to increase physical activity for the benefit of her well-being.     Patient has been advised of split billing requirements and indicates understanding: Yes    Patient Instructions   Labs today.  I will update refills when lab results return.    Verify you have had a tetanus vaccine at the pharmacy.  If not getting one before you visit new grandchild is recommended.  Mammogram as scheduled.  Colon cancer screening is due in September.  For the falling episodes, we will do the lab testing today.  If nothing as a cause for that is noted and symptoms of dizziness persist, evaluation with a heart monitor may be needed.  EKG today.  If you are in [place] and need a refill, they can transfer the script for the [place] [employer] to [place] [employer] if you ask them to. "         Taiwo Giron is a 73 year old, presenting for the following:  Physical (Annual wellness. Would like hemoglobin checked. Would like to discuss frequent falls.)        2/15/2024     9:04 AM   Additional Questions   Roomed by Hafsa HERRERA   Accompanied by Self         2/15/2024     9:04 AM   Patient Reported Additional Medications   Patient reports taking the following new medications None but starting Calcium soon         Health Care Directive  Patient does not have a Health Care Directive or Living Will: Discussed advance care planning with patient; information given to patient to review.    HPI      Hyperlipidemia Follow-Up    Are you regularly taking any medication or supplement to lower your cholesterol?   Yes- atorvastatin  Are you having muscle aches or other side effects that you think could be caused by your cholesterol lowering medication?  No    Hypertension Follow-up    Do you check your blood pressure regularly outside of the clinic? Yes   Are you following a low salt diet? Yes  Are your blood pressures ever more than 140 on the top number (systolic) OR more   than 90 on the bottom number (diastolic), for example 140/90? No       Anemia, unspecified type  - taking iron supplement.      Senile osteoporosis  - ongoing care endocrinology -  changing to Reclast from Prolia with dose given 9/8/23.            2/15/2024   General Health   How would you rate your overall physical health? Good   Feel stress (tense, anxious, or unable to sleep) Not at all         2/15/2024   Nutrition   Diet: Vegetarian/vegan         2/15/2024   Exercise   Days per week of moderate/strenous exercise 3 days   Not exercising.        2/15/2024   Social Factors   Frequency of gathering with friends or relatives Once a week   Worry food won't last until get money to buy more No   Food not last or not have enough money for food? No   Do you have housing?  Yes   Are you worried about losing your housing? No   Lack of  transportation? No   Unable to get utilities (heat,electricity)? No         2/15/2024   Fall Risk   Fallen 2 or more times in the past year? No    No   Trouble with walking or balance? Yes    No   Gait Speed Test (Document in seconds) 4   Gait Speed Test Interpretation Less than or equal to 5.00 seconds - PASS          2/15/2024   Activities of Daily Living- Home Safety   Needs help with the following daily activites None of the above   Safety concerns in the home None of the above         2/15/2024   Dental   Dentist two times every year? Yes         2/15/2024   Hearing Screening   Hearing concerns? None of the above         2/15/2024   Driving Risk Screening   Patient/family members have concerns about driving No         2/15/2024   General Alertness/Fatigue Screening   Have you been more tired than usual lately? No         2/15/2024   Urinary Incontinence Screening   Bothered by leaking urine in past 6 months No               Today's PHQ-2 Score:       2/15/2024     8:59 AM   PHQ-2 ( 1999 [employer])   Q1: Little interest or pleasure in doing things 0   Q2: Feeling down, depressed or hopeless 0   PHQ-2 Score 0   Q1: Little interest or pleasure in doing things Not at all   Q2: Feeling down, depressed or hopeless Not at all   PHQ-2 Score 0           2/15/2024   Substance Use   Alcohol more than 3/day or more than 7/wk No   Do you have a current opioid prescription? No   How severe/bad is pain from 1 to 10? 0/10 (No Pain)   Do you use any other substances recreationally? No     Social History     Tobacco Use    Smoking status: Never    Smokeless tobacco: Never   Vaping Use    Vaping Use: Never used   Substance Use Topics    Alcohol use: No    Drug use: No          Mammogram Screening - After age 74- determine frequency with patient based on health status, life expectancy and patient goals    The 10-year ASCVD risk score (Yung SCHRADER, et al., 2019) is: 19.1%    Values used to calculate the score:      Age: 73 years       Sex: Female      Is Non- : No      Diabetic: No      Tobacco smoker: No      Systolic Blood Pressure: 130 mmHg      Is BP treated: Yes      HDL Cholesterol: 78 mg/dL      Total Cholesterol: 173 mg/dL    Reviewed and updated as needed this visit by Provider   Tobacco  Allergies  Meds   Med Hx  Surg Hx  Fam Hx            Past Medical History:   Diagnosis Date    Hypertension      Past Surgical History:   Procedure Laterality Date    CATARACT EXTRACTION, BILATERAL      LASIK BILATERAL Bilateral 2012    Dr Chavez     BP Readings from Last 3 Encounters:   02/15/24 130/81   12/04/23 130/71   09/08/23 125/73    Wt Readings from Last 3 Encounters:   02/15/24 50.8 kg (112 lb)   12/04/23 50 kg (110 lb 4.8 oz)   09/08/23 50.8 kg (112 lb)                  Current providers sharing in care for this patient include:  Patient Care Team:  Sylvie Medrano MD as PCP - General (Family Medicine)  Deanna Lundberg MD as Assigned Endocrinology Provider  Mark Desai Chi, OD as MD (Optometry)  Barb Gonzalez MD as Assigned Surgical Provider  Sylvie Medrano MD as Assigned PCP    The following health maintenance items are reviewed in Epic and correct as of today:  Health Maintenance   Topic Date Due    RSV VACCINE (Pregnancy & 60+) (1 - 1-dose 60+ series) Never done    DTAP/TDAP/TD IMMUNIZATION (2 - Td or Tdap) 08/29/2022    INFLUENZA VACCINE (1) 09/01/2023    COVID-19 Vaccine (6 - 2023-24 season) 09/01/2023    EYE EXAM  08/03/2024    COLORECTAL CANCER SCREENING  09/19/2024    ANNUAL REVIEW OF HM ORDERS  12/04/2024    MEDICARE ANNUAL WELLNESS VISIT  02/15/2025    CMP  02/15/2025    LIPID  02/15/2025    MICROALBUMIN  02/15/2025    FALL RISK ASSESSMENT  02/15/2025    CBC  02/15/2025    MAMMO SCREENING  02/16/2025    GLUCOSE  02/15/2027    ADVANCE CARE PLANNING  12/20/2027    DEXA  04/21/2037    HEPATITIS C SCREENING  Completed    PHQ-2 (once per calendar year)  Completed    Pneumococcal Vaccine: 65+  "Years  Completed    ZOSTER IMMUNIZATION  Completed    IPV IMMUNIZATION  Aged Out    HPV IMMUNIZATION  Aged Out    MENINGITIS IMMUNIZATION  Aged Out    RSV MONOCLONAL ANTIBODY  Aged Out         Review of Systems  Constitutional, HEENT, cardiovascular, pulmonary, GI, , musculoskeletal, neuro, skin, endocrine and psych systems are negative, except as otherwise noted.   Passing out  - Sunday - afternoon.    Dizziness on occasion.  Noting hair loss.       Objective    Exam  /81 (BP Location: Right arm, Patient Position: Sitting, Cuff Size: Adult Regular)   Pulse 68   Temp 97.6  F (36.4  C) (Temporal)   Resp 16   Ht 1.397 m (4' 7\")   Wt 50.8 kg (112 lb)   SpO2 100%   BMI 26.03 kg/m     Estimated body mass index is 26.03 kg/m  as calculated from the following:    Height as of this encounter: 1.397 m (4' 7\").    Weight as of this encounter: 50.8 kg (112 lb).    Physical Exam  GENERAL: alert and no distress  EYES: Eyes grossly normal to inspection, PERRL and conjunctivae and sclerae normal  HENT: ear canals and TM's normal, nose and mouth without ulcers or lesions  NECK: no adenopathy, no asymmetry, masses, or scars  RESP: lungs clear to auscultation - no rales, rhonchi or wheezes  BREAST: normal without masses, tenderness or nipple discharge and no palpable axillary masses or adenopathy  CV: regular rate and rhythm, normal S1 S2, no S3 or S4, no murmur, click or rub, no peripheral edema  ABDOMEN: soft, nontender, no hepatosplenomegaly, no masses and bowel sounds normal  MS: no gross musculoskeletal defects noted, no edema  SKIN: no suspicious lesions or rashes  NEURO: Normal strength and tone, mentation intact and speech normal  PSYCH: mentation appears normal, affect normal/bright        2/15/2024   Mini Cog   Clock Draw Score 2 Normal   3 Item Recall 3 objects recalled   Mini Cog Total Score 5            Signed Electronically by: Sylvie Medrano MD          This chart was documented by provider using a " voice activated software called Dragon in addition to manual typing. There may be vocabulary errors or other grammatical errors due to this.

## 2024-02-16 ENCOUNTER — ANCILLARY PROCEDURE (OUTPATIENT)
Dept: MAMMOGRAPHY | Facility: CLINIC | Age: 74
End: 2024-02-16
Attending: FAMILY MEDICINE
Payer: COMMERCIAL

## 2024-02-16 ENCOUNTER — ANCILLARY ORDERS (OUTPATIENT)
Dept: FAMILY MEDICINE | Facility: CLINIC | Age: 74
End: 2024-02-16

## 2024-02-16 DIAGNOSIS — H25.9 AGE-RELATED CATARACT OF BOTH EYES, UNSPECIFIED AGE-RELATED CATARACT TYPE: ICD-10-CM

## 2024-02-16 DIAGNOSIS — Z01.818 PREOP GENERAL PHYSICAL EXAM: Primary | ICD-10-CM

## 2024-02-16 DIAGNOSIS — E78.5 HYPERLIPIDEMIA WITH TARGET LDL LESS THAN 130: ICD-10-CM

## 2024-02-16 DIAGNOSIS — Z12.31 VISIT FOR SCREENING MAMMOGRAM: ICD-10-CM

## 2024-02-16 DIAGNOSIS — I10 BENIGN ESSENTIAL HYPERTENSION: ICD-10-CM

## 2024-02-16 PROCEDURE — 77067 SCR MAMMO BI INCL CAD: CPT | Performed by: STUDENT IN AN ORGANIZED HEALTH CARE EDUCATION/TRAINING PROGRAM

## 2024-02-16 PROCEDURE — 77063 BREAST TOMOSYNTHESIS BI: CPT | Performed by: STUDENT IN AN ORGANIZED HEALTH CARE EDUCATION/TRAINING PROGRAM

## 2024-02-16 RX ORDER — ATORVASTATIN CALCIUM 20 MG/1
20 TABLET, FILM COATED ORAL EVERY EVENING
Qty: 90 TABLET | Refills: 3 | Status: SHIPPED | OUTPATIENT
Start: 2024-02-16 | End: 2024-03-25

## 2024-02-16 RX ORDER — LOSARTAN POTASSIUM AND HYDROCHLOROTHIAZIDE 25; 100 MG/1; MG/1
1 TABLET ORAL EVERY MORNING
Qty: 90 TABLET | Refills: 1 | Status: SHIPPED | OUTPATIENT
Start: 2024-02-16 | End: 2024-03-25

## 2024-02-16 NOTE — RESULT ENCOUNTER NOTE
Your blood sugar, kidney function, and liver testing are all normal.  Blood cell counts are improved from previous and within the normal range.  Iron stores are improved as well.  Your cholesterol is under good control with current treatment.  I will update a prescription for the atorvastatin for you now.  B12 level is normal.  Thyroid testing is normal.  Urine testing is normal.  There is not elevated protein present.  Please call or MyChart message me if you have any questions.      GILL

## 2024-02-19 ENCOUNTER — TELEPHONE (OUTPATIENT)
Dept: FAMILY MEDICINE | Facility: CLINIC | Age: 74
End: 2024-02-19
Payer: COMMERCIAL

## 2024-02-19 DIAGNOSIS — R55 PRE-SYNCOPE: Primary | ICD-10-CM

## 2024-02-19 NOTE — TELEPHONE ENCOUNTER
This writer attempted to contact patient on 02/19/24. Attempted call to preferred number on [phone] and person who picked stated to call [phone] (listed as home number).    Reason for call provider's message and left message to call clinic back at [phone].    If patient calls back:   Relay provider's message below and route patient's response to provider.      ALONA Beltre  [facility]      ----- Message from Sylvie Medrano MD sent at 2/16/2024  4:29 PM CST -----  Please call patient: With her recent passing out spell/fall in the kitchen I would recommend Zio patch testing.  I can put an order in and they can mail her the kit.  If she prefers she could return to clinic to have patch placed here although be sure she is aware there is an extra charge for that.  PSK    Your EKG does show some changes when comparing this to EKGs done in 2010 and 2014.  With your recent passing out episode and these findings, I would suggest that we monitor you with an at home heart monitor that can be mailed to your home and mail back to us after you have worn it for a week.  We will be contacting you to discuss this further.  Please call or MyChart message me if you have any questions.      PSK

## 2024-02-20 NOTE — TELEPHONE ENCOUNTER
RN called patient and spoke with patient's spouse Ivan (no CTC on file). Patient was present during the call and provided verbal consent for writer to speak with spouse.     RN relayed provider message below. Ivan verbalized good understanding and stated that they prefer to have patient come back to clinic and have Zio patch placed.     Routing to provider to place orders. Please have  call patient back to schedule appointment.    ALONA Montalvo  [facility] Triage

## 2024-02-21 ENCOUNTER — TELEPHONE (OUTPATIENT)
Dept: FAMILY MEDICINE | Facility: CLINIC | Age: 74
End: 2024-02-21
Payer: COMMERCIAL

## 2024-02-21 NOTE — TELEPHONE ENCOUNTER
Pt wants to know what type of vitamins she should start taking from [employer]. She will be going on vacation soon for her daughter's delivery and wants to make sure she has everything ready. She stated she forgot to ask PCP at visit.

## 2024-02-22 NOTE — TELEPHONE ENCOUNTER
Please call patient re:  She should take calcium 500 mg twice a day unless she is eating a lot of milk products.  In addition a multivitamin for women is recommended.      GILL

## 2024-02-22 NOTE — TELEPHONE ENCOUNTER
Patient and  updated on provider's message below. They verbalized good understanding. No further questions or concerns.    ALONA Montalvo  [facility] Primary [facility] Triage

## 2024-02-23 ENCOUNTER — ALLIED HEALTH/NURSE VISIT (OUTPATIENT)
Dept: FAMILY MEDICINE | Facility: CLINIC | Age: 74
End: 2024-02-23
Payer: COMMERCIAL

## 2024-02-23 DIAGNOSIS — R55 PRE-SYNCOPE: ICD-10-CM

## 2024-02-23 PROCEDURE — 93246 EXT ECG>7D<15D RECORDING: CPT

## 2024-02-23 NOTE — PROGRESS NOTES
Bree Carrillo arrived here on 2/23/2024 1:12 PM for  Zio monitor placement per ordering provider Dr. Medrano for the diagnosis Pre-syncope [R55]  - .  Patient s skin was prepped per protocol.  Zio monitor was placed.  Instructions were reviewed with and given to the patient.  Patient verbalized understanding of wear, troubleshooting and monitor return [id number] instructions.

## 2024-03-22 DIAGNOSIS — E78.5 HYPERLIPIDEMIA LDL GOAL <100: ICD-10-CM

## 2024-03-22 DIAGNOSIS — I10 BENIGN ESSENTIAL HYPERTENSION: ICD-10-CM

## 2024-03-22 RX ORDER — LOSARTAN POTASSIUM AND HYDROCHLOROTHIAZIDE 25; 100 MG/1; MG/1
1 TABLET ORAL EVERY MORNING
Qty: 90 TABLET | Refills: 1 | OUTPATIENT
Start: 2024-03-22

## 2024-03-22 RX ORDER — ATORVASTATIN CALCIUM 20 MG/1
20 TABLET, FILM COATED ORAL EVERY EVENING
Qty: 90 TABLET | Refills: 3 | OUTPATIENT
Start: 2024-03-22

## 2024-03-22 NOTE — TELEPHONE ENCOUNTER
Venus spouse calling on behalf of patient. RN explained no consent to communicate on file. Spouse calling to request a refill for patient, message written down, no info out. Requesting lipitor 20 mg and losartan 125 mg. Venus explained patient is staying in [place] with daughter and new grandbaby. Requested refill sent to new pharmacy in [place], no other needs at this time.     KEVYN OrozcoN, RN, PHN  [facility]

## 2024-03-25 DIAGNOSIS — E78.5 HYPERLIPIDEMIA LDL GOAL <100: ICD-10-CM

## 2024-03-25 DIAGNOSIS — I10 BENIGN ESSENTIAL HYPERTENSION: ICD-10-CM

## 2024-03-25 PROCEDURE — 93248 EXT ECG>7D<15D REV&INTERPJ: CPT | Performed by: INTERNAL MEDICINE

## 2024-03-25 RX ORDER — LOSARTAN POTASSIUM AND HYDROCHLOROTHIAZIDE 25; 100 MG/1; MG/1
1 TABLET ORAL EVERY MORNING
Qty: 90 TABLET | Refills: 0 | Status: SHIPPED | OUTPATIENT
Start: 2024-03-25 | End: 2024-07-22

## 2024-03-25 RX ORDER — ATORVASTATIN CALCIUM 20 MG/1
20 TABLET, FILM COATED ORAL EVERY EVENING
Qty: 90 TABLET | Refills: 0 | Status: SHIPPED | OUTPATIENT
Start: 2024-03-25 | End: 2024-07-24

## 2024-03-25 NOTE — TELEPHONE ENCOUNTER
Patient's spouse calling about refill that was requested on 3/22/24. Writer notes refills were denied because it was too soon. Spouse states they are in [place] for some time. Writer to route to provider to see if they can receive a refill over in [place] at the pended pharmacy.    Routing to provider to review and advise.    Nick Cardenas RN  [facility]

## 2024-04-01 NOTE — RESULT ENCOUNTER NOTE
Your underlying heart rhythm is normal.  You do have some extra beat noted but none of these are dangerous.  It does not appear that you had any symptoms reported while wearing the monitor.  If you have any recurrent symptoms of falling or passing out episodes, please follow up.  Please call or MyChart message me if you have any questions.      DUSTYK

## 2024-04-04 DIAGNOSIS — I10 BENIGN ESSENTIAL HYPERTENSION: ICD-10-CM

## 2024-04-05 RX ORDER — LOSARTAN POTASSIUM AND HYDROCHLOROTHIAZIDE 25; 100 MG/1; MG/1
1 TABLET ORAL EVERY MORNING
Qty: 90 TABLET | Refills: 0 | OUTPATIENT
Start: 2024-04-05

## 2024-05-22 NOTE — TELEPHONE ENCOUNTER
Detail Level: Detailed [facility] Call Center    Phone Message    May a detailed message be left on voicemail: yes    Reason for Call: Medication Refill Request    Has the patient contacted the pharmacy for the refill? Yes   Name of medication being requested: lovastatin (MEVACOR) 20 MG tablet  Provider who prescribed the medication: Senait torres  Pharmacy:    [id number] - [place], [place] - [address], [place] AT [facility]  Date medication is needed: asap      Action Taken: Message routed to:  Primary Care p [phone]   Detail Level: Simple Detail Level: Zone

## 2024-07-03 ENCOUNTER — TELEPHONE (OUTPATIENT)
Dept: GASTROENTEROLOGY | Facility: CLINIC | Age: 74
End: 2024-07-03
Payer: COMMERCIAL

## 2024-07-03 ENCOUNTER — HOSPITAL ENCOUNTER (OUTPATIENT)
Facility: AMBULATORY SURGERY CENTER | Age: 74
End: 2024-07-03
Attending: STUDENT IN AN ORGANIZED HEALTH CARE EDUCATION/TRAINING PROGRAM
Payer: COMMERCIAL

## 2024-07-03 NOTE — TELEPHONE ENCOUNTER
"Endoscopy Scheduling Screen    Have you had a positive Covid test in the last 14 days?  No    What is your communication preference for Instructions and/or Bowel Prep?   MyChart    What insurance is in the chart?  Other:  [employer] medicare    Ordering/Referring Provider:     JOSE ROBERTO CARPIO      (If ordering provider performs procedure, schedule with ordering provider unless otherwise instructed. )    BMI: Estimated body mass index is 26.03 kg/m  as calculated from the following:    Height as of 2/15/24: 1.397 m (4' 7\").    Weight as of 2/15/24: 50.8 kg (112 lb).     Sedation Ordered  moderate sedation.   If patient BMI > 50 do not schedule in [facility].    If patient BMI > 45 do not schedule at [facility].    Are you taking methadone or Suboxone?  No    Have you had difficulties, pain, or discomfort during past endoscopy procedures?  No    Are you taking any prescription medications for pain 3 or more times per week?   NO, No RN review required.    Do you have a history of malignant hyperthermia?  No    (Females) Are you currently pregnant?   No     Have you been diagnosed or told you have pulmonary hypertension?   No    Do you have an LVAD?  No    Have you been told you have moderate to severe sleep apnea?  No    Have you been told you have COPD, asthma, or any other lung disease?  No    Do you have any heart conditions?  No     Have you ever had or are you waiting for an organ transplant?  No. Continue scheduling, no site restrictions.    Have you had a stroke or transient ischemic attack (TIA aka \"mini stroke\" in the last 6 months?   No    Have you been diagnosed with or been told you have cirrhosis of the liver?   No    Are you currently on dialysis?   No    Do you need assistance transferring?   No    BMI: Estimated body mass index is 26.03 kg/m  as calculated from the following:    Height as of 2/15/24: 1.397 m (4' 7\").    Weight as of 2/15/24: 50.8 kg (112 lb).     Is patients BMI > 40 and scheduling location " [facility]?  No    Do you take an injectable medication for weight loss or diabetes (excluding insulin)?  No    Do you take the medication Naltrexone?  No    Do you take blood thinners?  No       Prep   Are you currently on dialysis or do you have chronic kidney disease?  No    Do you have a diagnosis of diabetes?  No    Do you have a diagnosis of cystic fibrosis (CF)?  No    On a regular basis do you go 3 -5 days between bowel movements?  No    BMI > 40?  No    Preferred Pharmacy:          [id number] - [place] - [address]  [address]  [place] [address]  Phone: [phone] Fax: [phone]        Final Scheduling Details     Procedure scheduled  Colonoscopy    Surgeon:  Ry     Date of procedure:  8/21     Pre-OP / PAC:   No - Not required for this site.    Location  [facility] - Per order.    Sedation   Moderate Sedation - Per order.      Patient Reminders:   You will receive a call from a Nurse to review instructions and health history.  This assessment must be completed prior to your procedure.  Failure to complete the Nurse assessment may result in the procedure being cancelled.      On the day of your procedure, please designate an adult(s) who can drive you home stay with you for the next 24 hours. The medicines used in the exam will make you sleepy. You will not be able to drive.      You cannot take public transportation, ride share services, or non-medical taxi service without a responsible caregiver.  Medical transport services are allowed with the requirement that a responsible caregiver will receive you at your destination.  We require that drivers and caregivers are confirmed prior to your procedure.

## 2024-07-22 ENCOUNTER — TELEPHONE (OUTPATIENT)
Dept: ENDOCRINOLOGY | Facility: CLINIC | Age: 74
End: 2024-07-22
Payer: COMMERCIAL

## 2024-07-22 DIAGNOSIS — I10 BENIGN ESSENTIAL HYPERTENSION: ICD-10-CM

## 2024-07-22 RX ORDER — LOSARTAN POTASSIUM AND HYDROCHLOROTHIAZIDE 25; 100 MG/1; MG/1
1 TABLET ORAL EVERY MORNING
Qty: 90 TABLET | Refills: 1 | Status: SHIPPED | OUTPATIENT
Start: 2024-07-22 | End: 2024-09-11

## 2024-07-22 NOTE — TELEPHONE ENCOUNTER
Please notify patient this is her 1 year follow-up office visit with Dr. Lundberg for her Osteoporosis. It appears she is overdue for DEXA scan. She should have this done this week if possible. We are unable to anticipate what Dr. Lundberg will review or order at office visit.    Yoselin Meza RN  Endocrine Care Coordinator  [facility]

## 2024-07-22 NOTE — TELEPHONE ENCOUNTER
07/22/24 Writer called n/a left a message to call the clinic.    Regarding: Please notify patient this is her 1 year follow-up office visit with Dr. Lundberg for her Osteoporosis. It appears she is overdue for DEXA scan. She should have this done this week if possible. We are unable to anticipate what Dr. Lundberg will review or order at office visit.    Will send a mychart and try again later.     Babs Nelson -General Care Navigator

## 2024-07-22 NOTE — TELEPHONE ENCOUNTER
07/22/24 Writer called and spoke to pt.     Regarding: Please notify patient this is her 1 year follow-up office visit with Dr. Lundberg for her Osteoporosis. It appears she is overdue for DEXA scan. She should have this done this week if possible. We are unable to anticipate what Dr. Lundberg will review or order at office visit.     Babs Nelson -General Care Navigator

## 2024-07-22 NOTE — TELEPHONE ENCOUNTER
[facility] Call Center    Phone Message    May a detailed message be left on voicemail: yes     Reason for Call: Other: Pt requesting call back, pt is wanting to know what will be done at her appt on 7/26 with Dr. Lundberg

## 2024-07-24 DIAGNOSIS — E78.5 HYPERLIPIDEMIA LDL GOAL <100: ICD-10-CM

## 2024-07-24 RX ORDER — ATORVASTATIN CALCIUM 20 MG/1
20 TABLET, FILM COATED ORAL EVERY EVENING
Qty: 90 TABLET | Refills: 1 | Status: SHIPPED | OUTPATIENT
Start: 2024-07-24

## 2024-07-26 ENCOUNTER — OFFICE VISIT (OUTPATIENT)
Dept: ENDOCRINOLOGY | Facility: CLINIC | Age: 74
End: 2024-07-26
Payer: COMMERCIAL

## 2024-07-26 VITALS
DIASTOLIC BLOOD PRESSURE: 78 MMHG | OXYGEN SATURATION: 97 % | HEART RATE: 74 BPM | BODY MASS INDEX: 26.26 KG/M2 | SYSTOLIC BLOOD PRESSURE: 153 MMHG | WEIGHT: 113 LBS

## 2024-07-26 DIAGNOSIS — M81.0 SENILE OSTEOPOROSIS: Primary | ICD-10-CM

## 2024-07-26 DIAGNOSIS — M81.0 OSTEOPOROSIS, UNSPECIFIED OSTEOPOROSIS TYPE, UNSPECIFIED PATHOLOGICAL FRACTURE PRESENCE: ICD-10-CM

## 2024-07-26 PROCEDURE — 99214 OFFICE O/P EST MOD 30 MIN: CPT | Performed by: INTERNAL MEDICINE

## 2024-07-26 RX ORDER — LOSARTAN POTASSIUM 50 MG/1
1 TABLET ORAL AT BEDTIME
COMMUNITY
Start: 2024-07-22

## 2024-07-26 RX ORDER — ALBUTEROL SULFATE 90 UG/1
1-2 AEROSOL, METERED RESPIRATORY (INHALATION)
Status: CANCELLED
Start: 2024-07-26

## 2024-07-26 RX ORDER — ALBUTEROL SULFATE 0.83 MG/ML
2.5 SOLUTION RESPIRATORY (INHALATION)
Status: CANCELLED | OUTPATIENT
Start: 2024-07-26

## 2024-07-26 RX ORDER — EPINEPHRINE 1 MG/ML
0.3 INJECTION, SOLUTION INTRAMUSCULAR; SUBCUTANEOUS EVERY 5 MIN PRN
Status: CANCELLED | OUTPATIENT
Start: 2024-07-26

## 2024-07-26 RX ORDER — DIPHENHYDRAMINE HYDROCHLORIDE 50 MG/ML
50 INJECTION INTRAMUSCULAR; INTRAVENOUS
Status: CANCELLED
Start: 2024-07-26

## 2024-07-26 RX ORDER — ZOLEDRONIC ACID 5 MG/100ML
5 INJECTION, SOLUTION INTRAVENOUS ONCE
Status: CANCELLED
Start: 2024-07-26

## 2024-07-26 RX ORDER — HEPARIN SODIUM (PORCINE) LOCK FLUSH IV SOLN 100 UNIT/ML 100 UNIT/ML
5 SOLUTION INTRAVENOUS
Status: CANCELLED | OUTPATIENT
Start: 2024-07-26

## 2024-07-26 RX ORDER — METHYLPREDNISOLONE SODIUM SUCCINATE 125 MG/2ML
125 INJECTION, POWDER, LYOPHILIZED, FOR SOLUTION INTRAMUSCULAR; INTRAVENOUS
Status: CANCELLED
Start: 2024-07-26

## 2024-07-26 RX ORDER — ACETAMINOPHEN 325 MG/1
325 TABLET ORAL ONCE
Status: CANCELLED | OUTPATIENT
Start: 2024-07-26 | End: 2024-07-26

## 2024-07-26 RX ORDER — MEPERIDINE HYDROCHLORIDE 25 MG/ML
25 INJECTION INTRAMUSCULAR; INTRAVENOUS; SUBCUTANEOUS EVERY 30 MIN PRN
Status: CANCELLED | OUTPATIENT
Start: 2024-07-26

## 2024-07-26 RX ORDER — HEPARIN SODIUM,PORCINE 10 UNIT/ML
5-20 VIAL (ML) INTRAVENOUS DAILY PRN
Status: CANCELLED | OUTPATIENT
Start: 2024-07-26

## 2024-07-26 NOTE — PATIENT INSTRUCTIONS
Welcome to the [facility] Endocrinology and Diabetes Clinics     Our Endocrinology Clinics are here to provide you with a team-based, collaborative approach in the diagnosis and treatment of patients with diabetes and endocrine disorders. The team is made up of Physicians, Physician Assistants, Certified Diabetes Educators, Registered Nurses, Medical Assistants, Emergency Medical Technicians, and many others, all of whom have the unified goal of providing our patients with high quality care.     Please see below for some helpful tips to best navigate and use the [facility] Endocrinology clinic:     [place]da Respect: At [facility], we are committed to a respectful and safe space for all patients, visitors, and staff.  We believe that mutual respect between patients and their care team is the foundation of quality care.  It is our expectation that you will be treated with respect by your care team.  In turn, we ask that all communication with the care team (written and verbal) be respectful and free from profanity, threatening, or abusive language.  Disrespectful communication undermines our therapeutic relationship with you and may result in us being unable to continue to provide your care.    Refills: A provider must see you at least annually to prescribe and refill medications. This is to ensure your safety as well as meet insurance and compliance regulations.    Scheduling: Many of our Providers offer both in-person or video visits. Please call to schedule any needed follow ups as soon as possible because our provider schedules fill up very quickly. Our care team has the right to require an in-person visit when they believe that it is medically necessary. Please remember that for any virtual visits, you must be in the [facility] at the time of the visit, otherwise we are unable to see you and you will need to be rescheduled.    Missed Appointments: If you need to cancel or miss your  scheduled appointment, please call the clinic at [phone] to reschedule.  Please note if you repeatedly miss appointments or repeatedly miss appointments without calling to inform us ahead of time (no-show), the clinic may elect to not allow you to reschedule without speaking to a manager, may require a Partnership In Care Agreement prior to rescheduling, or could result in you no longer being able to receive care from the clinic. Providing the clinic with timely notification if you have to miss an appointment, allows us to better serve the needs of all of our patients.    Primary Care Provider: Our Endocrinologists are Specialists in their field. We expect you to have a Primary Care Provider established to handle any needs outside of your diabetes and endocrine care.  We would be happy to assist you find a Primary Care Provider, if you do not have one.    [employer]: [employer] is a wonderful resource that allows you access to your Care Team via online or the idego. Please ask a member of the team if you would like help creating an account. Please note that it may take up to 2 business days for a response. [employer] messages are not reviewed on weekends or after business hours.  Emergent or urgent care needs should never be communicated via [employer].  If you experience a medical emergency call 911 or go to the nearest emergency room.    Labs: It is recommended that you stay within the [facility] System for labs but you are welcome to obtain ordered labs (with some exceptions) from any location of your choice as long as they are able to complete and process the needed labs. If you need us to fax orders to your preferred lab, please provide us the name and fax number of the lab you would like to go to so we can fax the orders. If your labs are drawn outside of the [facility], please have them fax the results to [phone] ([place]) or [phone] ([place]) or via [facility]. It is your  responsibility to ensure that outside lab results are sent to us.    We look forward to working with you. Please do not hesitate to reach out with any questions.    Thank you,    The Endocrine Team    [facility] Address:   [place] Address:     [address] [place] [address]    Phone: [phone]  Fax: [phone] [address] [place] [address]    Phone: [phone]  Fax: [phone]     [facility] Cost Estimate Phone Number: [phone]    General Lab and Imaging Scheduling Phone Number: [phone]

## 2024-07-26 NOTE — PROGRESS NOTES
- Endocrinology Follow up -    Reason for visit/consult:  Osteoporosis    Primary care provider: Clinic, [facility]      Assessment and Plan  74 year old female with osteoporosis    # Osteoporosis  Compared to 2015, 2017 bone density showed lumbar slight improvement from a -2.7 up to -2.4.  However left hip decreased bone density, which she has had pain recently.  Prolia seems working, we will modify calcium supplement and continue Prolia for now.       - continue Reclast this year 3rd one     - Bone density summer 2024    - continue Ca supplement from 1 tab to 2 tabs Mon/Wed/Fri she has abdominal symptoms and constipation.     - check vitamin D today      #Subclinical hypothyroidism   Was on LT4 50 mcg and held since 10/2021 but not taking currently but appears euthyroi      35 minutes spent on the date of the encounter doing chart review, history and exam, documentation and further activities as noted above.    Deanna Lundberg MD  Staff Physician  Endocrinology and Metabolism  [facility]  License: [place] [address]  Pager: [phone]    Interval History as of 7/26/2024 : Patient has been doing well.. Medication compliance: compliant to Ca supplement, not much doing exercise   . New event includes  : no pertinent medical event noted.   Interval History as of 7/28/2023 : Patient has been doing well . Medication compliance : she received Reclast infusion once  . New event includes  : no pertinent medical event noted.  Interval History as of 2/25/2022 : Patient has been doing well. Last seen 1 year ago . Medication compliance good for Ca supplement . New event includes: no pertinent medical event noted, but mentioned Prolia cost $3000 out of pocket each time  .  Interval History as of 2/12/2021 : Patient has been doing well. Last year skipped prolia since COVID. No fractures. Taking citracal petite 2 tab but started  to have bothering GI and stopped.   HPI: A 68 female her for osteoporosis. Osteoporosis diagnosed in 2015 with T score -2.7 lumber, by bone density. Last few month left pain hip and hands. Last 2 years she has been on prolia, so far received 4 times. Last dose of Prolia was last winter. Calcium carbnate prescribed but could not swallow because of the size.   Currently switched to Calcium carbonate gummies OTC unknown dose (4 tab).  She has never tried Fosamax.     Prior fragility fracture: no  Parental history of hip fracture: no  Rheumatoid arthritis: no  Secondary cause of osteoporosis: no  Body habitus (BMI less than or equal to 20): no  Family history of osteoporosis: no  Sedentary lifestyle: no  Current tabacco smoking: no  History of thyroid disorder: no  Calcuim and Vitmin D supplements: calicum carbonate gummies  Other supplement or bone treatment: Prolia   Medication use (PPI, epileptic medications etc): no  Early menopause (female): around 47.     Past Medical/Surgical History:  Past Medical History:   Diagnosis Date    Hypertension      Past Surgical History:   Procedure Laterality Date    CATARACT EXTRACTION, BILATERAL      LASIK BILATERAL Bilateral 2012    Dr Chavez       Allergies:  Allergies   Allergen Reactions    Iohexol Hives     Pt experienced sneeze, itching followed by hives post 100cc Omni 350 for CT Scan.   Observed with IV in place x 45 minutes.   Given 25mg PO Benadryl with OK for 50mg to be taken every 8 hours for the next 24 hours per Dr Rausch.     JRS       Current Medications   Current Outpatient Medications   Medication Sig Dispense Refill    losartan (COZAAR) 50 MG tablet Take 1 tablet by mouth at bedtime      atorvastatin (LIPITOR) 20 MG tablet Take 1 tablet (20 mg) by mouth every evening 90 tablet 1    carboxymethylcellulose PF (CARBOXYMETHYLCELLULOSE SODIUM) 0.5 % ophthalmic solution Place 1 drop into both eyes 4 times daily Preservative free artificial tears, single use vials. 400  each 11    Cholecalciferol (VITAMIN D3) 1.25 MG (44217 UT) TABS       ferrous fumarate 65 mg, [employer]. FE,-Vitamin C 125 mg (VITRON C)  MG TABS tablet Take 1 tablet by mouth On Monday, Wednesday and Friday      losartan-hydrochlorothiazide (HYZAAR) 100-25 MG tablet TAKE 1 TABLET BY MOUTH EVERY MORNING 90 tablet 1     No current facility-administered medications for this visit.       Family History:  Family History   Problem Relation Age of Onset    Hypertension Mother     Hypertension Father     No Known Problems Sister     Coronary Artery Disease Brother     No Known Problems Maternal Grandmother     No Known Problems Maternal Grandfather     No Known Problems Paternal Grandmother     No Known Problems Paternal Grandfather     No Known Problems Daughter     No Known Problems Son     No Known Problems Maternal Half-Brother     No Known Problems Maternal Half-Sister     No Known Problems Paternal Half-Brother     No Known Problems Paternal Half-Sister     No Known Problems Niece     No Known Problems Nephew     No Known Problems Cousin     No Known Problems Other     Diabetes No family hx of     Hyperlipidemia No family hx of     Cerebrovascular Disease No family hx of     Breast Cancer No family hx of     Colon Cancer No family hx of     Prostate Cancer No family hx of     Other Cancer No family hx of     Depression No family hx of     Anxiety Disorder No family hx of     Mental Illness No family hx of     Substance Abuse No family hx of     Anesthesia Reaction No family hx of     Asthma No family hx of     Osteoporosis No family hx of     Genetic Disorder No family hx of     Thyroid Disease No family hx of     Obesity No family hx of     Unknown/Adopted No family hx of     Glaucoma No family hx of     Macular Degeneration No family hx of        Social History:  Social History     Tobacco Use    Smoking status: Never    Smokeless tobacco: Never   Substance Use Topics    Alcohol use: No   lives with , adult  kids (37, 35). 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     ROS:  Full review of systems taken with the help of the intake sheet. Otherwise a complete 14 point review of systems was taken and is negative unless stated in the history above.    Physical Exam:   not currently breastfeeding.    General: well appearing, no acute distress, pleasant and conversant,   Mental Status/neuro: alert and oriented  Face: symmetrical, normal facial color  Eyes: anicteric, PERRL, no proptosis or lid lag  Neck: suppler, no lymphadenopahty  Thyroid: normal size and texture, no nodule palpable, no bruits  Back: No scoliosis no kyphosis  Heart: regular rhythm, S1S2, no murmur appreciated  Lung: clear to auscultation bilaterally  Abdomen: soft, NT/ND, no hepatomegaly  Legs: no swelling or edema      Labs : I reviewed data from epic and extract and summarize the pertinent data here.   Lab Results   Component Value Date     07/23/2018      Lab Results   Component Value Date    POTASSIUM 4.0 07/23/2018     Lab Results   Component Value Date    CHLORIDE 107 07/23/2018     Lab Results   Component Value Date    DEVAN 8.3 07/23/2018     Lab Results   Component Value Date    CO2 29 07/23/2018     Lab Results   Component Value Date    BUN 20 07/23/2018     Lab Results   Component Value Date    CR 0.70 07/23/2018     Lab Results   Component Value Date    GLC 90 07/23/2018     Lab Results   Component Value Date    TSH 2.35 07/23/2018     Lab Results   Component Value Date    T4 0.96 06/28/2018       Bone Denisty: 8/4/2017: I also personally reviewed the original images and explained to the patient .      1.  This patient's T-score meets the World Health Organization (WHO)   criteria for osteoporosis at one or more measured sites (T-score -2.5 or   below).  The risk of osteoporotic fracture increases approximately   two-fold for each 1.0 SD decrease in T-score.    2.  Moderate levoscoliosis of lumbar spine.       COMPARISON:  Comparison with  previous study dated 06/17/2015 shows:    There was a statistically significant increase in the bone mineral density   in the spine.    There was no statistically significant change in the bone mineral density   in the right hip.    There was a statistically significant decrease in the bone mineral density   in the left hip.      Comparison with baseline study dated 09/05/2013 shows:    There was a statistically significant increase in the bone mineral density   in the spine.    There was no statistically significant change in the bone mineral density   in the right hip.    There was a statistically significant decrease in the bone mineral density   in the left hip.      Statistical significance is determined by the least significant change   (LSC) for the scanner and operators at this facility.    FRAX is not reported because this patient's T-score meets the World Health   Organization (WHO) criteria for osteoporosis and this patient is currently   taking medication for abnormal bone density.    The scan details are available in the patient's chart in [facility].    Radha Navarro M.D.    Breast/Diagnostic Radiologist  [facility]  [email or web address]  SJA:collin  R:8/4/2017 T:8/4/2017   Result Narrative   DIAGNOSTIC DXA BONE MINERAL DENSITY, 8/4/2017    CLINICAL HISTORY:  This is a 67-year-old female patient.      RISK FACTORS:  The patient has estrogen deficiency.  The patient has a   history of osteoporosis based on a prior BMD study.  The patient has a   body weight under 127 pounds.  The patient has the following medical   condition(s):  Vitamin D deficiency.  The patient has taken for at least   one month the following medications in the last year:  Medications for   osteoporosis.    TECHNIQUE:  Dual-energy x-ray absorptiometry system (axial skeleton).  The   patient was scanned on a GE Lunar Prodigy scanner, fast-array scan mode.    The study was technically adequate.      FINDINGS:        BMD T-Score Z-Score   Lumbar Spine (L1 to L4) 0.896 g/cm2 -2.4 -0.2   Right Hip Femoral Neck 0.758 g/cm2 -2.0 -0.1   Right Total Hip (BMD for comparison to prior) 0.773 g/cm2 -1.9 -0.1   Left Hip Femoral Neck 0.688 g/cm2 -2.5 -0.6   Left Total Hip (BMD for comparison to prior) 0.757 g/cm2 -2.0 -0.2     6/17/2015    DIAGNOSTIC DXA BONE MINERAL DENSITY, 6/17/2015    CLINICAL HISTORY:  This is a 64-year-old female patient.      RISK FACTORS:  The patient has estrogen deficiency.  The patient has a   history of low bone density based on a prior BMD study (osteopenia).  The   patient has a body weight under 127 pounds.      TECHNIQUE:  Dual-energy x-ray absorptiometry system (axial skeleton).  The   patient was scanned on a GE Lunar Prodigy scanner, fast-array scan mode.    The study was technically adequate.      FINDINGS:       BMD T-Score Z-Score   Lumbar Spine (L1 to L4) 0.861 g/cm2 -2.7 -0.6   Right Hip Femoral Neck 0.748 g/cm2 -2.1 -0.3   Right Total Hip   0.739 g/cm2 -2.1 -0.6   Left Hip Femoral Neck 0.722 g/cm2 -2.3 -0.5   Left Total Hip   0.808 g/cm2 -1.6 0.0

## 2024-07-26 NOTE — LETTER
7/26/2024      Bree Carrillo  [address]  [facility] [address]      Dear Colleague,    Thank you for referring your patient, Bree Carrillo, to the [facility]. Please see a copy of my visit note below.                                                                               - Endocrinology Follow up -    Reason for visit/consult:  Osteoporosis    Primary care provider: [facility], [facility]      Assessment and Plan  74 year old female with osteoporosis    # Osteoporosis  Compared to 2015, 2017 bone density showed lumbar slight improvement from a -2.7 up to -2.4.  However left hip decreased bone density, which she has had pain recently.  Prolia seems working, we will modify calcium supplement and continue Prolia for now.       - continue Reclast this year 3rd one     - Bone density summer 2024    - continue Ca supplement from 1 tab to 2 tabs Mon/Wed/Fri she has abdominal symptoms and constipation.     - check vitamin D today      #Subclinical hypothyroidism   Was on LT4 50 mcg and held since 10/2021 but not taking currently but appears euthyroi      35 minutes spent on the date of the encounter doing chart review, history and exam, documentation and further activities as noted above.    Deanna Lundberg MD  Staff Physician  Endocrinology and Metabolism  [facility]  License: [id number]  Pager: [phone]    Interval History as of 7/26/2024 : Patient has been doing well.. Medication compliance: compliant to Ca supplement, not much doing exercise   . New event includes  : no pertinent medical event noted.   Interval History as of 7/28/2023 : Patient has been doing well . Medication compliance : she received Reclast infusion once  . New event includes  : no pertinent medical event noted.  Interval History as of 2/25/2022 : Patient has been doing well. Last seen 1 year ago . Medication compliance good for Ca supplement . New event includes: no  pertinent medical event noted, but mentioned Prolia cost $3000 out of pocket each time  .  Interval History as of 2/12/2021 : Patient has been doing well. Last year skipped prolia since COVID. No fractures. Taking citracal petite 2 tab but started to have bothering GI and stopped.   HPI: A 68 female her for osteoporosis. Osteoporosis diagnosed in 2015 with T score -2.7 lumber, by bone density. Last few month left pain hip and hands. Last 2 years she has been on prolia, so far received 4 times. Last dose of Prolia was last winter. Calcium carbnate prescribed but could not swallow because of the size.   Currently switched to Calcium carbonate gummies OTC unknown dose (4 tab).  She has never tried Fosamax.     Prior fragility fracture: no  Parental history of hip fracture: no  Rheumatoid arthritis: no  Secondary cause of osteoporosis: no  Body habitus (BMI less than or equal to 20): no  Family history of osteoporosis: no  Sedentary lifestyle: no  Current tabacco smoking: no  History of thyroid disorder: no  Calcuim and Vitmin D supplements: calicum carbonate gummies  Other supplement or bone treatment: Prolia   Medication use (PPI, epileptic medications etc): no  Early menopause (female): around 47.     Past Medical/Surgical History:  Past Medical History:   Diagnosis Date     Hypertension      Past Surgical History:   Procedure Laterality Date     CATARACT EXTRACTION, BILATERAL       LASIK BILATERAL Bilateral 2012    Dr Chavez       Allergies:  Allergies   Allergen Reactions     Iohexol Hives     Pt experienced sneeze, itching followed by hives post 100cc Omni 350 for CT Scan.   Observed with IV in place x 45 minutes.   Given 25mg PO Benadryl with OK for 50mg to be taken every 8 hours for the next 24 hours per Dr Rausch.     JRS       Current Medications   Current Outpatient Medications   Medication Sig Dispense Refill     losartan (COZAAR) 50 MG tablet Take 1 tablet by mouth at bedtime       atorvastatin (LIPITOR) 20  MG tablet Take 1 tablet (20 mg) by mouth every evening 90 tablet 1     carboxymethylcellulose PF (CARBOXYMETHYLCELLULOSE SODIUM) 0.5 % ophthalmic solution Place 1 drop into both eyes 4 times daily Preservative free artificial tears, single use vials. 400 each 11     Cholecalciferol (VITAMIN D3) 1.25 MG (73783 UT) TABS        ferrous fumarate 65 mg, [place]. FE,-Vitamin C 125 mg (VITRON C)  MG TABS tablet Take 1 tablet by mouth On Monday, Wednesday and Friday       losartan-hydrochlorothiazide (HYZAAR) 100-25 MG tablet TAKE 1 TABLET BY MOUTH EVERY MORNING 90 tablet 1     No current facility-administered medications for this visit.       Family History:  Family History   Problem Relation Age of Onset     Hypertension Mother      Hypertension Father      No Known Problems Sister      Coronary Artery Disease Brother      No Known Problems Maternal Grandmother      No Known Problems Maternal Grandfather      No Known Problems Paternal Grandmother      No Known Problems Paternal Grandfather      No Known Problems Daughter      No Known Problems Son      No Known Problems Maternal Half-Brother      No Known Problems Maternal Half-Sister      No Known Problems Paternal Half-Brother      No Known Problems Paternal Half-Sister      No Known Problems Niece      No Known Problems Nephew      No Known Problems Cousin      No Known Problems Other      Diabetes No family hx of      Hyperlipidemia No family hx of      Cerebrovascular Disease No family hx of      Breast Cancer No family hx of      Colon Cancer No family hx of      Prostate Cancer No family hx of      Other Cancer No family hx of      Depression No family hx of      Anxiety Disorder No family hx of      Mental Illness No family hx of      Substance Abuse No family hx of      Anesthesia Reaction No family hx of      Asthma No family hx of      Osteoporosis No family hx of      Genetic Disorder No family hx of      Thyroid Disease No family hx of      Obesity No  family hx of      Unknown/Adopted No family hx of      Glaucoma No family hx of      Macular Degeneration No family hx of        Social History:  Social History     Tobacco Use     Smoking status: Never     Smokeless tobacco: Never   Substance Use Topics     Alcohol use: No   lives with , adult kids (37, 35). 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     ROS:  Full review of systems taken with the help of the intake sheet. Otherwise a complete 14 point review of systems was taken and is negative unless stated in the history above.    Physical Exam:   not currently breastfeeding.    General: well appearing, no acute distress, pleasant and conversant,   Mental Status/neuro: alert and oriented  Face: symmetrical, normal facial color  Eyes: anicteric, PERRL, no proptosis or lid lag  Neck: suppler, no lymphadenopahty  Thyroid: normal size and texture, no nodule palpable, no bruits  Back: No scoliosis no kyphosis  Heart: regular rhythm, S1S2, no murmur appreciated  Lung: clear to auscultation bilaterally  Abdomen: soft, NT/ND, no hepatomegaly  Legs: no swelling or edema      Labs : I reviewed data from epic and extract and summarize the pertinent data here.   Lab Results   Component Value Date     07/23/2018      Lab Results   Component Value Date    POTASSIUM 4.0 07/23/2018     Lab Results   Component Value Date    CHLORIDE 107 07/23/2018     Lab Results   Component Value Date    DEVAN 8.3 07/23/2018     Lab Results   Component Value Date    CO2 29 07/23/2018     Lab Results   Component Value Date    BUN 20 07/23/2018     Lab Results   Component Value Date    CR 0.70 07/23/2018     Lab Results   Component Value Date    GLC 90 07/23/2018     Lab Results   Component Value Date    TSH 2.35 07/23/2018     Lab Results   Component Value Date    T4 0.96 06/28/2018       Bone Denisty: 8/4/2017: I also personally reviewed the original images and explained to the patient .      1.  This patient's T-score meets the World  Health Organization (WHO)   criteria for osteoporosis at one or more measured sites (T-score -2.5 or   below).  The risk of osteoporotic fracture increases approximately   two-fold for each 1.0 SD decrease in T-score.    2.  Moderate levoscoliosis of lumbar spine.       COMPARISON:  Comparison with previous study dated 06/17/2015 shows:    There was a statistically significant increase in the bone mineral density   in the spine.    There was no statistically significant change in the bone mineral density   in the right hip.    There was a statistically significant decrease in the bone mineral density   in the left hip.      Comparison with baseline study dated 09/05/2013 shows:    There was a statistically significant increase in the bone mineral density   in the spine.    There was no statistically significant change in the bone mineral density   in the right hip.    There was a statistically significant decrease in the bone mineral density   in the left hip.      Statistical significance is determined by the least significant change   (LSC) for the scanner and operators at this facility.    FRAX is not reported because this patient's T-score meets the World Health   Organization (WHO) criteria for osteoporosis and this patient is currently   taking medication for abnormal bone density.    The scan details are available in the patient's chart in [facility].    Radha Navarro M.D.    Breast/Diagnostic Radiologist  [facility]  [email or web address]  SJA:collin  R:8/4/2017 T:8/4/2017   Result Narrative   DIAGNOSTIC DXA BONE MINERAL DENSITY, 8/4/2017    CLINICAL HISTORY:  This is a 67-year-old female patient.      RISK FACTORS:  The patient has estrogen deficiency.  The patient has a   history of osteoporosis based on a prior BMD study.  The patient has a   body weight under 127 pounds.  The patient has the following medical   condition(s):  Vitamin D deficiency.  The patient has taken for at  least   one month the following medications in the last year:  Medications for   osteoporosis.    TECHNIQUE:  Dual-energy x-ray absorptiometry system (axial skeleton).  The   patient was scanned on a GE Lunar Prodigy scanner, fast-array scan mode.    The study was technically adequate.      FINDINGS:       BMD T-Score Z-Score   Lumbar Spine (L1 to L4) 0.896 g/cm2 -2.4 -0.2   Right Hip Femoral Neck 0.758 g/cm2 -2.0 -0.1   Right Total Hip (BMD for comparison to prior) 0.773 g/cm2 -1.9 -0.1   Left Hip Femoral Neck 0.688 g/cm2 -2.5 -0.6   Left Total Hip (BMD for comparison to prior) 0.757 g/cm2 -2.0 -0.2     6/17/2015    DIAGNOSTIC DXA BONE MINERAL DENSITY, 6/17/2015    CLINICAL HISTORY:  This is a 64-year-old female patient.      RISK FACTORS:  The patient has estrogen deficiency.  The patient has a   history of low bone density based on a prior BMD study (osteopenia).  The   patient has a body weight under 127 pounds.      TECHNIQUE:  Dual-energy x-ray absorptiometry system (axial skeleton).  The   patient was scanned on a GE Lunar Prodigy scanner, fast-array scan mode.    The study was technically adequate.      FINDINGS:       BMD T-Score Z-Score   Lumbar Spine (L1 to L4) 0.861 g/cm2 -2.7 -0.6   Right Hip Femoral Neck 0.748 g/cm2 -2.1 -0.3   Right Total Hip   0.739 g/cm2 -2.1 -0.6   Left Hip Femoral Neck 0.722 g/cm2 -2.3 -0.5   Left Total Hip   0.808 g/cm2 -1.6 0.0       Again, thank you for allowing me to participate in the care of your patient.        Sincerely,        Deanna Lundberg MD

## 2024-07-26 NOTE — NURSING NOTE
Bree Carrillo's goals for this visit include:   Chief Complaint   Patient presents with    Follow Up    Osteoporosis     She requests these members of her care team be copied on today's visit information: Yes     PCP: Sylvie Medrano    Referring Provider:  Reina Corley APRN CNP  [address] [place] [address]    BP (!) 153/78 (BP Location: Right arm, Patient Position: Sitting, Cuff Size: Adult Regular)   Pulse 74   Wt 51.3 kg (113 lb)   SpO2 97%   BMI 26.26 kg/m      Do you need any medication refills at today's visit? Unsure        Flaquita Yancey, DEMETRIUS  Adult Endocrinology   [facility]

## 2024-08-05 ENCOUNTER — TELEPHONE (OUTPATIENT)
Dept: GASTROENTEROLOGY | Facility: CLINIC | Age: 74
End: 2024-08-05
Payer: COMMERCIAL

## 2024-08-05 NOTE — TELEPHONE ENCOUNTER
Hayden note that pt requested their pre-assessment call go to their home number first.      Caller: Bree    Reason for Reschedule/Cancellation   (please be detailed, any staff messages or encounters to note?): Pt travel conflict      Prior to reschedule please review:  Ordering Provider: Sylvie Medrano MD  Sedation Determined: Mod  Does patient have any ASC Exclusions, please identify?: n      Notes on Cancelled Procedure:  Procedure: Lower Endoscopy [Colonoscopy]   Date: 08/21/2024  Location: [facility]; [address], [place] [address]   Surgeon: Ry      Rescheduled: Yes,   Procedure: Lower Endoscopy [Colonoscopy]    Date: 09/18/2024   Location: [facility]; [address], [place] [address]    Surgeon: Antonio   Sedation Level Scheduled  Mod ,  Reason for Sedation Level order   Instructions updated and sent: Y     Does patient need PAC or Pre -Op Rescheduled? : n       Did you cancel or rescheduled an EUS procedure? No.

## 2024-08-07 ENCOUNTER — ANCILLARY PROCEDURE (OUTPATIENT)
Dept: BONE DENSITY | Facility: CLINIC | Age: 74
End: 2024-08-07
Attending: INTERNAL MEDICINE
Payer: COMMERCIAL

## 2024-08-07 DIAGNOSIS — M81.0 SENILE OSTEOPOROSIS: ICD-10-CM

## 2024-08-07 PROCEDURE — 77080 DXA BONE DENSITY AXIAL: CPT | Performed by: RADIOLOGY

## 2024-08-07 PROCEDURE — 77081 DXA BONE DENSITY APPENDICULR: CPT | Mod: XU | Performed by: RADIOLOGY

## 2024-09-11 ENCOUNTER — INFUSION THERAPY VISIT (OUTPATIENT)
Dept: INFUSION THERAPY | Facility: CLINIC | Age: 74
End: 2024-09-11
Attending: INTERNAL MEDICINE
Payer: COMMERCIAL

## 2024-09-11 ENCOUNTER — TELEPHONE (OUTPATIENT)
Dept: GASTROENTEROLOGY | Facility: CLINIC | Age: 74
End: 2024-09-11

## 2024-09-11 VITALS
HEART RATE: 67 BPM | TEMPERATURE: 97.8 F | SYSTOLIC BLOOD PRESSURE: 138 MMHG | DIASTOLIC BLOOD PRESSURE: 7 MMHG | BODY MASS INDEX: 25.76 KG/M2 | HEIGHT: 55 IN | WEIGHT: 111.3 LBS | RESPIRATION RATE: 16 BRPM | OXYGEN SATURATION: 97 %

## 2024-09-11 DIAGNOSIS — M81.0 OSTEOPOROSIS, UNSPECIFIED OSTEOPOROSIS TYPE, UNSPECIFIED PATHOLOGICAL FRACTURE PRESENCE: ICD-10-CM

## 2024-09-11 DIAGNOSIS — M81.0 OSTEOPOROSIS, UNSPECIFIED OSTEOPOROSIS TYPE, UNSPECIFIED PATHOLOGICAL FRACTURE PRESENCE: Primary | ICD-10-CM

## 2024-09-11 DIAGNOSIS — M81.0 SENILE OSTEOPOROSIS: ICD-10-CM

## 2024-09-11 DIAGNOSIS — M81.0 SENILE OSTEOPOROSIS: Primary | ICD-10-CM

## 2024-09-11 LAB
CALCIUM SERPL-MCNC: 9.8 MG/DL (ref 8.8–10.4)
CREAT SERPL-MCNC: 0.85 MG/DL (ref 0.51–0.95)
EGFRCR SERPLBLD CKD-EPI 2021: 71 ML/MIN/1.73M2
HOLD SPECIMEN: NORMAL
HOLD SPECIMEN: NORMAL

## 2024-09-11 PROCEDURE — 82565 ASSAY OF CREATININE: CPT | Performed by: INTERNAL MEDICINE

## 2024-09-11 PROCEDURE — 82310 ASSAY OF CALCIUM: CPT | Performed by: INTERNAL MEDICINE

## 2024-09-11 PROCEDURE — 250N000011 HC RX IP 250 OP 636: Performed by: INTERNAL MEDICINE

## 2024-09-11 PROCEDURE — 258N000003 HC RX IP 258 OP 636: Performed by: INTERNAL MEDICINE

## 2024-09-11 PROCEDURE — 99207 PR NO CHARGE LOS: CPT

## 2024-09-11 PROCEDURE — 96365 THER/PROPH/DIAG IV INF INIT: CPT

## 2024-09-11 PROCEDURE — 36415 COLL VENOUS BLD VENIPUNCTURE: CPT | Performed by: INTERNAL MEDICINE

## 2024-09-11 RX ORDER — ZOLEDRONIC ACID 5 MG/100ML
5 INJECTION, SOLUTION INTRAVENOUS ONCE
Start: 2025-09-11

## 2024-09-11 RX ORDER — ALBUTEROL SULFATE 0.83 MG/ML
2.5 SOLUTION RESPIRATORY (INHALATION)
OUTPATIENT
Start: 2025-09-11

## 2024-09-11 RX ORDER — HEPARIN SODIUM (PORCINE) LOCK FLUSH IV SOLN 100 UNIT/ML 100 UNIT/ML
5 SOLUTION INTRAVENOUS
OUTPATIENT
Start: 2025-09-11

## 2024-09-11 RX ORDER — HEPARIN SODIUM,PORCINE 10 UNIT/ML
5-20 VIAL (ML) INTRAVENOUS DAILY PRN
OUTPATIENT
Start: 2025-09-11

## 2024-09-11 RX ORDER — ALBUTEROL SULFATE 90 UG/1
1-2 AEROSOL, METERED RESPIRATORY (INHALATION)
Status: CANCELLED
Start: 2025-09-11

## 2024-09-11 RX ORDER — ALBUTEROL SULFATE 90 UG/1
1-2 AEROSOL, METERED RESPIRATORY (INHALATION)
Start: 2025-09-11

## 2024-09-11 RX ORDER — HEPARIN SODIUM,PORCINE 10 UNIT/ML
5-20 VIAL (ML) INTRAVENOUS DAILY PRN
Status: CANCELLED | OUTPATIENT
Start: 2025-09-11

## 2024-09-11 RX ORDER — MEPERIDINE HYDROCHLORIDE 25 MG/ML
25 INJECTION INTRAMUSCULAR; INTRAVENOUS; SUBCUTANEOUS EVERY 30 MIN PRN
Status: CANCELLED | OUTPATIENT
Start: 2025-09-11

## 2024-09-11 RX ORDER — ALBUTEROL SULFATE 0.83 MG/ML
2.5 SOLUTION RESPIRATORY (INHALATION)
Status: CANCELLED | OUTPATIENT
Start: 2025-09-11

## 2024-09-11 RX ORDER — MEPERIDINE HYDROCHLORIDE 25 MG/ML
25 INJECTION INTRAMUSCULAR; INTRAVENOUS; SUBCUTANEOUS EVERY 30 MIN PRN
OUTPATIENT
Start: 2025-09-11

## 2024-09-11 RX ORDER — METHYLPREDNISOLONE SODIUM SUCCINATE 125 MG/2ML
125 INJECTION, POWDER, LYOPHILIZED, FOR SOLUTION INTRAMUSCULAR; INTRAVENOUS
Status: CANCELLED
Start: 2025-09-11

## 2024-09-11 RX ORDER — METHYLPREDNISOLONE SODIUM SUCCINATE 125 MG/2ML
125 INJECTION, POWDER, LYOPHILIZED, FOR SOLUTION INTRAMUSCULAR; INTRAVENOUS
Start: 2025-09-11

## 2024-09-11 RX ORDER — EPINEPHRINE 1 MG/ML
0.3 INJECTION, SOLUTION INTRAMUSCULAR; SUBCUTANEOUS EVERY 5 MIN PRN
OUTPATIENT
Start: 2025-09-11

## 2024-09-11 RX ORDER — ZOLEDRONIC ACID 5 MG/100ML
5 INJECTION, SOLUTION INTRAVENOUS ONCE
Status: CANCELLED
Start: 2025-09-11

## 2024-09-11 RX ORDER — ACETAMINOPHEN 325 MG/1
325 TABLET ORAL ONCE
OUTPATIENT
Start: 2025-09-11 | End: 2025-09-11

## 2024-09-11 RX ORDER — ZOLEDRONIC ACID 5 MG/100ML
5 INJECTION, SOLUTION INTRAVENOUS ONCE
Status: COMPLETED | OUTPATIENT
Start: 2024-09-11 | End: 2024-09-11

## 2024-09-11 RX ORDER — HEPARIN SODIUM (PORCINE) LOCK FLUSH IV SOLN 100 UNIT/ML 100 UNIT/ML
5 SOLUTION INTRAVENOUS
Status: CANCELLED | OUTPATIENT
Start: 2025-09-11

## 2024-09-11 RX ORDER — DIPHENHYDRAMINE HYDROCHLORIDE 50 MG/ML
50 INJECTION INTRAMUSCULAR; INTRAVENOUS
Start: 2025-09-11

## 2024-09-11 RX ORDER — DIPHENHYDRAMINE HYDROCHLORIDE 50 MG/ML
50 INJECTION INTRAMUSCULAR; INTRAVENOUS
Status: CANCELLED
Start: 2025-09-11

## 2024-09-11 RX ORDER — EPINEPHRINE 1 MG/ML
0.3 INJECTION, SOLUTION INTRAMUSCULAR; SUBCUTANEOUS EVERY 5 MIN PRN
Status: CANCELLED | OUTPATIENT
Start: 2025-09-11

## 2024-09-11 RX ORDER — ACETAMINOPHEN 325 MG/1
325 TABLET ORAL ONCE
Status: CANCELLED | OUTPATIENT
Start: 2025-09-11 | End: 2025-09-11

## 2024-09-11 RX ADMIN — ZOLEDRONIC ACID 5 MG: 5 INJECTION, SOLUTION INTRAVENOUS at 09:43

## 2024-09-11 RX ADMIN — SODIUM CHLORIDE 250 ML: 9 INJECTION, SOLUTION INTRAVENOUS at 09:43

## 2024-09-11 ASSESSMENT — PAIN SCALES - GENERAL: PAINLEVEL: MILD PAIN (3)

## 2024-09-11 NOTE — TELEPHONE ENCOUNTER
Pt with syncopal episodes back in 2/2024. Completed Zio Patch monitoring, which was normal, but saw cardiology (out of system) in 4/2024 due to ongoing symptoms and falls. Cardiology recommended Echo and Nuclear Stress Test and follow up in 3 months.     Echo completed on 5/2/2024 (care everywhere). Results noted:  EF 87%    Does not appear pt has completed the nuclear stress test and/or cardiology follow up. Please discuss with pt when completing PA.  Will need to send to anesthesia review for approval.   --------------------------------------------------------------------------------------------------------------------      Procedure details:    Patient scheduled for Colonoscopy on 9/18/24.     Arrival time: 0815. Procedure time 0900    Facility location: [facility]; [address], [place] [address]. Check in location: 2nd Floor at Surgery desk.    Sedation type: Conscious sedation     Pre op exam needed? No.    Indication for procedure: Screening       Chart review:     Electronic implanted devices? No    Recent diagnosis of diverticulitis within the last 6 weeks? No      Medication review:    Diabetic? No    Anticoagulants? No    Weight loss medication/injectable? No GLP-1 medication per patient's medication list.  RN will verify with pre-assessment call.    Other medication HOLDING recommendations:  Ferrous sulfate (iron supplements): HOLD 7 days before procedure.- Verify if pt is taking       Prep for procedure:     Bowel prep recommendation: Standard Miralax  Did not send CPI or prep instructions         Margarita Noble RN  Endoscopy Procedure Pre Assessment RN  [phone] option 2

## 2024-09-11 NOTE — TELEPHONE ENCOUNTER
Staff message sent to joy for review as patient states she has not completed nuclear stress test or cardiac follow up.    --------------------------------------------------------------------------------------------------------------------        Pre assessment completed for upcoming procedure.   (Please see previous telephone encounter notes for complete details)    Procedure details:    Arrival time and facility location reviewed.    Pre op exam needed? No.    Designated  policy reviewed. Instructed to have someone stay 6 hours post procedure.     COVID policy reviewed.      Medication review:    Medications reviewed. Please see supporting documentation below. Holding recommendations discussed (if applicable).       Prep for procedure:     Procedure prep instructions reviewed.        Additional information needed?  N/A      Patient  verbalized understanding and had no questions or concerns at this time.      Corinne Kliber, RN  Endoscopy Procedure Pre Assessment   [phone] option 4

## 2024-09-11 NOTE — PROGRESS NOTES
Infusion Nursing Note:  Bree Carrillo presents today for Reclast.    Patient seen by provider today: No   present during visit today: Not Applicable.    Note: The patient reports feeling well and at her baseline. She reports tolerating her Reclast infusions well.    Patient denies dental issues at this time. Patient will inform dentist that she got Reclast infusion, prior to any invasive dental work in the future.    Reminded patient to drink 6-8 glasses of water today, and tomorrow, to protect kidneys.     Intravenous Access:  Peripheral IV placed.    Treatment Conditions:  Lab Results   Component Value Date     02/15/2024    POTASSIUM 4.3 02/15/2024    CR 0.85 09/11/2024    DEVAN 9.8 09/11/2024    BILITOTAL 0.5 02/15/2024    ALBUMIN 4.2 02/15/2024    ALT 27 02/15/2024    AST 27 02/15/2024       Results reviewed, labs MET treatment parameters, ok to proceed with treatment.      Post Infusion Assessment:  Patient tolerated infusion without incident.  Blood return noted pre and post infusion.  Site patent and intact, free from redness, edema or discomfort.  No evidence of extravasations.  Access discontinued per protocol.       Discharge Plan:   AVS to patient via MYCHART.  Patient will return as directed by provider for next appointment.   Patient discharged in stable condition accompanied by: self.  Departure Mode: Ambulatory.      Kaylynn Tyler RN

## 2024-09-12 NOTE — TELEPHONE ENCOUNTER
Fuller, Benjamin, MD Kliber, Corinne, RN  Needs to complete stress test or see cardiology and have them say its not needed prior.    Contacted the patient to relay the above message. Writer advised at this time the procedure will be cancelled and patient can call back to reschedule.  The patient verbalizes understanding and agrees to the plan.    Message to scheduling to cancel procedure.    Corinne Kliber, RN  Endoscopy Procedure Pre Assessment RN  [phone] option 2

## 2024-09-12 NOTE — TELEPHONE ENCOUNTER
Caller: RN spoke with patient    Reason for Reschedule/Cancellation   (please be detailed, any staff messages or encounters to note?): Needs cardiac testing and clearance.       Prior to reschedule please review:  Ordering Provider: Sylvie Medrano  Sedation Determined: CS  Does patient have any ASC Exclusions, please identify?: TBD - awaiting cardiac clearance      Notes on Cancelled Procedure:  Procedure: Lower Endoscopy [Colonoscopy]   Date: 9/18/2024  Location: [facility]; [address] [place] [address], [address] Floor, [place] [address]   Surgeon: Antonio      Rescheduled: No, not at this time. Patient will call back once cleared.        Did you cancel or rescheduled an EUS procedure? No.

## 2024-09-12 NOTE — TELEPHONE ENCOUNTER
Kliber, Corinne, RN  P Endoscopy Scheduling Pool  Hello there,    Colonoscopy 9/18/24 at .      Please cancel procedure per Dr Olmedo note below, patient will need to complete nuclear stress test and have follow up giving clearance for patient to have colonoscopy.    Writer contacted the patient to relay the above message, patient agrees to have cancelled as she has not scheduled either of these appts.  The patient has scheduling phone number to call back to reschedule.    Please cancel procedure for now.    Thank you  Corinne

## 2024-09-17 ENCOUNTER — TELEPHONE (OUTPATIENT)
Dept: FAMILY MEDICINE | Facility: CLINIC | Age: 74
End: 2024-09-17
Payer: COMMERCIAL

## 2024-09-17 DIAGNOSIS — R55 PRE-SYNCOPE: Primary | ICD-10-CM

## 2024-09-17 NOTE — TELEPHONE ENCOUNTER
Order/Referral Request    Who is requesting: patient    Orders being requested: cardiology    Reason service is needed/diagnosis: recommended colonoscopy but was told to see cardiology to get a report from them to be able to do it     When are orders needed by: asap    Has this been discussed with Provider: Yes    Does patient have a preference on a Group/Provider/Facility? [facility]    Does patient have an appointment scheduled?: No    Where to send orders: Place orders within Epic    [facility] or would you prefer to receive a phone call?:   No preference   Okay to leave a detailed message?: Yes at [phone]

## 2024-09-20 NOTE — TELEPHONE ENCOUNTER
Patient's  Ivan is calling to check on the status of cardiology referral.    Patient needs to be cleared by cardiology for colonoscopy.    Patient saw cardiologist in [place] when visited her daughter in May of this year.    Currently does not have a cardiologist in [place].    Ligia Bañuelos RN, BSN  [facility]

## 2024-09-24 ENCOUNTER — OFFICE VISIT (OUTPATIENT)
Dept: CARDIOLOGY | Facility: CLINIC | Age: 74
End: 2024-09-24
Attending: INTERNAL MEDICINE
Payer: COMMERCIAL

## 2024-09-24 VITALS
SYSTOLIC BLOOD PRESSURE: 177 MMHG | OXYGEN SATURATION: 99 % | WEIGHT: 113.6 LBS | BODY MASS INDEX: 26.29 KG/M2 | DIASTOLIC BLOOD PRESSURE: 85 MMHG | HEART RATE: 70 BPM

## 2024-09-24 DIAGNOSIS — Z01.810 PRE-OPERATIVE CARDIOVASCULAR EXAMINATION: ICD-10-CM

## 2024-09-24 DIAGNOSIS — E78.5 HYPERLIPIDEMIA WITH TARGET LDL LESS THAN 130: Primary | ICD-10-CM

## 2024-09-24 DIAGNOSIS — I10 BENIGN ESSENTIAL HYPERTENSION: ICD-10-CM

## 2024-09-24 DIAGNOSIS — R55 PRE-SYNCOPE: ICD-10-CM

## 2024-09-24 PROCEDURE — G0463 HOSPITAL OUTPT CLINIC VISIT: HCPCS | Performed by: INTERNAL MEDICINE

## 2024-09-24 PROCEDURE — 99204 OFFICE O/P NEW MOD 45 MIN: CPT | Performed by: INTERNAL MEDICINE

## 2024-09-24 ASSESSMENT — PAIN SCALES - GENERAL: PAINLEVEL: NO PAIN (0)

## 2024-09-24 NOTE — PATIENT INSTRUCTIONS
Complete a diagnostic test called an exercise stress echocardiogram.    As soon as results are compiled and reviewed, you will be notified.  Follow up based on results.     Instructions for exercise echo    1. Nothing to eat or drink 3 hours prior except for water.   2. No caffeine, alcohol, or tobacco 12 hours prior  3. Wear comfortable clothes.

## 2024-09-24 NOTE — PROGRESS NOTES
I am delighted to see Bree Carrillo in consultation.The primary encounter diagnosis was Hyperlipidemia with target LDL less than 130. Diagnoses of Benign essential hypertension and Pre-operative cardiovascular examination were also pertinent to this visit.   As you know, the patient is a 74 year old  female. She   has a past medical history of Hyperlipidemia and Hypertension..    On this visit, the patient states that she has had 3 falls but no loss of consciousness. She was referred because of an abnormal ECG.  She has good exercise tolerance. She says her BP is normal at home.  The patient denies chest pressure/discomfort, dyspnea, palpitations, near-syncope, syncope, orthopnea, paroxysmal nocturnal dyspnea, and lower extermity edema.    The patient's cardiovascular risk factors include hypertension and high cholesterol.    The following portions of the patient's history were reviewed and updated as appropriate: allergies, current medications, past family history, past medical history, past social history, past surgical history, and the problem list.    PMH: The patient's past medical history includes:    Past Medical History:   Diagnosis Date    Hyperlipidemia     Hypertension       Past Surgical History:   Procedure Laterality Date    CATARACT EXTRACTION, BILATERAL      LASIK BILATERAL Bilateral 2012    Dr Chavez       The patient's medications as of the current encounter are:     Current Outpatient Medications   Medication Sig Dispense Refill    atorvastatin (LIPITOR) 20 MG tablet Take 1 tablet (20 mg) by mouth every evening 90 tablet 1    Calcium-Phosphorus-Vitamin D (CITRACAL +D3 PO)       Cholecalciferol (VITAMIN D3) 1.25 MG (55407 UT) TABS       ferrous fumarate 65 mg, [place]. FE,-Vitamin C 125 mg (VITRON C)  MG TABS tablet Take 1 tablet by mouth On Monday, Wednesday and Friday      losartan (COZAAR) 50 MG tablet Take 1 tablet by mouth at bedtime         Labs:     Lab on 09/11/2024   Component Date  Value Ref Range Status    Creatinine 09/11/2024 0.85  0.51 - 0.95 mg/dL Final    GFR Estimate 09/11/2024 71  >60 mL/min/1.73m2 Final    eGFR calculated using 2021 CKD-EPI equation.    Calcium 09/11/2024 9.8  8.8 - 10.4 mg/dL Final    Reference intervals for this test were updated on 7/16/2024 to reflect our healthy population more accurately. There may be differences in the flagging of prior results with similar values performed with this method. Those prior results can be interpreted in the context of the updated reference intervals.    Hold Specimen 09/11/2024 [facility]   Final    Hold Specimen 09/11/2024 [facility]   Final       Allergies:    Allergies   Allergen Reactions    Iohexol Hives     Pt experienced sneeze, itching followed by hives post 100cc Omni 350 for CT Scan.   Observed with IV in place x 45 minutes.   Given 25mg PO Benadryl with OK for 50mg to be taken every 8 hours for the next 24 hours per Dr Rausch.     JRS       Family History:   Family History   Problem Relation Age of Onset    Hypertension Mother     Hypertension Father     Dementia Father     Coronary Artery Disease Brother     LUNG DISEASE Brother     No Known Problems Sister     No Known Problems Son     No Known Problems Daughter     Diabetes No family hx of     Hyperlipidemia No family hx of     Cerebrovascular Disease No family hx of     Breast Cancer No family hx of     Colon Cancer No family hx of     Prostate Cancer No family hx of     Other Cancer No family hx of     Depression No family hx of     Anxiety Disorder No family hx of     Mental Illness No family hx of     Substance Abuse No family hx of     Anesthesia Reaction No family hx of     Asthma No family hx of     Osteoporosis No family hx of     Genetic Disorder No family hx of     Thyroid Disease No family hx of     Obesity No family hx of     Unknown/Adopted No family hx of     Glaucoma No family hx of     Macular Degeneration No family hx of        Psychosocial history:  reports that  she has never smoked. She has never used smokeless tobacco. She reports that she does not drink alcohol and does not use drugs.    Review of systems: negative for, palpitations, exertional chest pain or pressure, paroxysmal nocturnal dyspnea, dyspnea on exertion, orthopnea, lower extremity edema, syncope or near-syncope, and exercise intolerance    In addition,   General: No change in weight, sleep or appetite.  Normal energy.  No fever or chills  Eyes: Negative for vision changes or eye problems  ENT: No problems with ears, nose or throat.  No difficulty swallowing.  Resp: No coughing, wheezing or shortness of breath  GI: No nausea, vomiting,  heartburn, abdominal pain, diarrhea, constipation or change in bowel habits  : No urinary frequency or dysuria, bladder or kidney problems  Musculoskeletal: No significant muscle or joint pains  Neurologic: No headaches, numbness, tingling, weakness, problems with balance or coordination  Psychiatric: No problems with anxiety, depression or mental health  Heme/immune/allergy: No history of bleeding or clotting problems or anemia.    Endocrine: No history of thyroid disease, diabetes or other endocrine disorders  Skin: No rashes,worrisome lesions or skin problems  Vascular:  No claudication, lifestyle limiting or otherwise; no ischemic rest pain; no non-healing ulcers. No weakness, No loss of sensation        Physical examination  Vitals: BP (!) 177/85 (BP Location: Right arm, Patient Position: Chair, Cuff Size: Adult Regular)   Pulse 70   Wt 51.5 kg (113 lb 9.6 oz)   SpO2 99%   BMI 26.29 kg/m    BMI= Body mass index is 26.29 kg/m .    In general, the patient is a pleasant female in no apparent distress.    HEENT: Normiocephalic and atraumatic.  PERRLA.  EOMI.  Sclerae white, not injected.      Neck: No adenopathy.  No thyromegaly. Carotids +2/2 bilaterally without bruits.  No jugular venous distension.   Heart:  The PMI is in the 5th ICS in the midclavicular line. There  is no heave. Regular rate and rhythm. Normal S1, S2 splits physiologically. No murmur, rub, click, or gallop.    Lungs: Clear to asculation.  No ronchi, wheezes, rales.  No dullness to percussion.   Abdomen: Soft, nontender, nondistended. No organomegaly. No AAA.  No bruits.   Extremities: No clubbing, cyanosis, or edema. The pulses were intact bilaterally.   Neurological: The neurological examination reveal a patient who was oriented to person, place, and time.  The remainder of the examination was nonfocal.    Cardiac tests include:    2/15/24 - ecg - NSR, RBBB, LPFB  3/25/24 - zio - no significant arrhythmias    Assessment and Plan    Preop - check echo stress  HTN - on losartan  HL - on statin    The patient is to return  prn. The patient understood the treatment plan as outlined above.  There were no barriers to learning. Patient accompanied by .      Rivera Tyler MD

## 2024-09-24 NOTE — LETTER
9/24/2024      RE: Bree Carrillo  [address]  [facility] [address]       Dear Colleague,    Thank you for the opportunity to participate in the care of your patient, Bree Carrillo, at the [facility]. Please see a copy of my visit note below.    I am delighted to see Bree Carrillo in consultation.The primary encounter diagnosis was Hyperlipidemia with target LDL less than 130. Diagnoses of Benign essential hypertension and Pre-operative cardiovascular examination were also pertinent to this visit.   As you know, the patient is a 74 year old  female. She   has a past medical history of Hyperlipidemia and Hypertension..    On this visit, the patient states that she has had 3 falls but no loss of consciousness. She was referred because of an abnormal ECG.  She has good exercise tolerance. She says her BP is normal at home.  The patient denies chest pressure/discomfort, dyspnea, palpitations, near-syncope, syncope, orthopnea, paroxysmal nocturnal dyspnea, and lower extermity edema.    The patient's cardiovascular risk factors include hypertension and high cholesterol.    The following portions of the patient's history were reviewed and updated as appropriate: allergies, current medications, past family history, past medical history, past social history, past surgical history, and the problem list.    PMH: The patient's past medical history includes:    Past Medical History:   Diagnosis Date     Hyperlipidemia      Hypertension       Past Surgical History:   Procedure Laterality Date     CATARACT EXTRACTION, BILATERAL       LASIK BILATERAL Bilateral 2012    Dr Chavez       The patient's medications as of the current encounter are:     Current Outpatient Medications   Medication Sig Dispense Refill     atorvastatin (LIPITOR) 20 MG tablet Take 1 tablet (20 mg) by mouth every evening 90 tablet 1      Calcium-Phosphorus-Vitamin D (CITRACAL +D3 PO)        Cholecalciferol (VITAMIN D3) 1.25 MG (35097 UT) TABS        ferrous fumarate 65 mg, [place]. FE,-Vitamin C 125 mg (VITRON C)  MG TABS tablet Take 1 tablet by mouth On Monday, Wednesday and Friday       losartan (COZAAR) 50 MG tablet Take 1 tablet by mouth at bedtime         Labs:     Lab on 09/11/2024   Component Date Value Ref Range Status     Creatinine 09/11/2024 0.85  0.51 - 0.95 mg/dL Final     GFR Estimate 09/11/2024 71  >60 mL/min/1.73m2 Final    eGFR calculated using 2021 CKD-EPI equation.     Calcium 09/11/2024 9.8  8.8 - 10.4 mg/dL Final    Reference intervals for this test were updated on 7/16/2024 to reflect our healthy population more accurately. There may be differences in the flagging of prior results with similar values performed with this method. Those prior results can be interpreted in the context of the updated reference intervals.     Hold Specimen 09/11/2024 [facility]   Final     Hold Specimen 09/11/2024 [facility]   Final       Allergies:    Allergies   Allergen Reactions     Iohexol Hives     Pt experienced sneeze, itching followed by hives post 100cc Omni 350 for CT Scan.   Observed with IV in place x 45 minutes.   Given 25mg PO Benadryl with OK for 50mg to be taken every 8 hours for the next 24 hours per Dr Rausch.     JRS       Family History:   Family History   Problem Relation Age of Onset     Hypertension Mother      Hypertension Father      Dementia Father      Coronary Artery Disease Brother      LUNG DISEASE Brother      No Known Problems Sister      No Known Problems Son      No Known Problems Daughter      Diabetes No family hx of      Hyperlipidemia No family hx of      Cerebrovascular Disease No family hx of      Breast Cancer No family hx of      Colon Cancer No family hx of      Prostate Cancer No family hx of      Other Cancer No family hx of      Depression No family hx of      Anxiety Disorder No family hx of      Mental Illness  No family hx of      Substance Abuse No family hx of      Anesthesia Reaction No family hx of      Asthma No family hx of      Osteoporosis No family hx of      Genetic Disorder No family hx of      Thyroid Disease No family hx of      Obesity No family hx of      Unknown/Adopted No family hx of      Glaucoma No family hx of      Macular Degeneration No family hx of        Psychosocial history:  reports that she has never smoked. She has never used smokeless tobacco. She reports that she does not drink alcohol and does not use drugs.    Review of systems: negative for, palpitations, exertional chest pain or pressure, paroxysmal nocturnal dyspnea, dyspnea on exertion, orthopnea, lower extremity edema, syncope or near-syncope, and exercise intolerance    In addition,   General: No change in weight, sleep or appetite.  Normal energy.  No fever or chills  Eyes: Negative for vision changes or eye problems  ENT: No problems with ears, nose or throat.  No difficulty swallowing.  Resp: No coughing, wheezing or shortness of breath  GI: No nausea, vomiting,  heartburn, abdominal pain, diarrhea, constipation or change in bowel habits  : No urinary frequency or dysuria, bladder or kidney problems  Musculoskeletal: No significant muscle or joint pains  Neurologic: No headaches, numbness, tingling, weakness, problems with balance or coordination  Psychiatric: No problems with anxiety, depression or mental health  Heme/immune/allergy: No history of bleeding or clotting problems or anemia.    Endocrine: No history of thyroid disease, diabetes or other endocrine disorders  Skin: No rashes,worrisome lesions or skin problems  Vascular:  No claudication, lifestyle limiting or otherwise; no ischemic rest pain; no non-healing ulcers. No weakness, No loss of sensation        Physical examination  Vitals: BP (!) 177/85 (BP Location: Right arm, Patient Position: Chair, Cuff Size: Adult Regular)   Pulse 70   Wt 51.5 kg (113 lb 9.6 oz)    SpO2 99%   BMI 26.29 kg/m    BMI= Body mass index is 26.29 kg/m .    In general, the patient is a pleasant female in no apparent distress.    HEENT: Normiocephalic and atraumatic.  PERRLA.  EOMI.  Sclerae white, not injected.      Neck: No adenopathy.  No thyromegaly. Carotids +2/2 bilaterally without bruits.  No jugular venous distension.   Heart:  The PMI is in the 5th ICS in the midclavicular line. There is no heave. Regular rate and rhythm. Normal S1, S2 splits physiologically. No murmur, rub, click, or gallop.    Lungs: Clear to asculation.  No ronchi, wheezes, rales.  No dullness to percussion.   Abdomen: Soft, nontender, nondistended. No organomegaly. No AAA.  No bruits.   Extremities: No clubbing, cyanosis, or edema. The pulses were intact bilaterally.   Neurological: The neurological examination reveal a patient who was oriented to person, place, and time.  The remainder of the examination was nonfocal.    Cardiac tests include:    2/15/24 - ecg - NSR, RBBB, LPFB  3/25/24 - zio - no significant arrhythmias    Assessment and Plan    Preop - check echo stress  HTN - on losartan  HL - on statin    The patient is to return  prn. The patient understood the treatment plan as outlined above.  There were no barriers to learning. Patient accompanied by .      Rivera Tyler MD     Please do not hesitate to contact me if you have any questions/concerns.     Sincerely,     Rivera Tyler MD

## 2024-09-24 NOTE — NURSING NOTE
Chief Complaint   Patient presents with    New Patient     new - referral from PCP     Vitals were taken and medications reconciled.    Nirav Tanner, EMT  10:29 AM

## 2024-09-26 ENCOUNTER — HOSPITAL ENCOUNTER (OUTPATIENT)
Dept: CARDIOLOGY | Facility: CLINIC | Age: 74
Discharge: HOME | End: 2024-09-26
Attending: INTERNAL MEDICINE | Admitting: INTERNAL MEDICINE
Payer: COMMERCIAL

## 2024-09-26 DIAGNOSIS — E78.5 HYPERLIPIDEMIA WITH TARGET LDL LESS THAN 130: ICD-10-CM

## 2024-09-26 DIAGNOSIS — I10 BENIGN ESSENTIAL HYPERTENSION: ICD-10-CM

## 2024-09-26 DIAGNOSIS — Z01.810 PRE-OPERATIVE CARDIOVASCULAR EXAMINATION: ICD-10-CM

## 2024-09-26 PROCEDURE — 93350 STRESS TTE ONLY: CPT | Mod: 26 | Performed by: INTERNAL MEDICINE

## 2024-09-26 PROCEDURE — 93016 CV STRESS TEST SUPVJ ONLY: CPT | Performed by: INTERNAL MEDICINE

## 2024-09-26 PROCEDURE — 255N000002 HC RX 255 OP 636: Performed by: INTERNAL MEDICINE

## 2024-09-26 PROCEDURE — 93325 DOPPLER ECHO COLOR FLOW MAPG: CPT | Mod: TC

## 2024-09-26 PROCEDURE — 93325 DOPPLER ECHO COLOR FLOW MAPG: CPT | Mod: 26 | Performed by: INTERNAL MEDICINE

## 2024-09-26 PROCEDURE — 93018 CV STRESS TEST I&R ONLY: CPT | Performed by: INTERNAL MEDICINE

## 2024-09-26 PROCEDURE — 93321 DOPPLER ECHO F-UP/LMTD STD: CPT | Mod: 26 | Performed by: INTERNAL MEDICINE

## 2024-09-26 RX ADMIN — PERFLUTREN 5 ML: 6.52 INJECTION, SUSPENSION INTRAVENOUS at 10:07

## 2024-09-30 ENCOUNTER — TELEPHONE (OUTPATIENT)
Dept: CARDIOLOGY | Facility: CLINIC | Age: 74
End: 2024-09-30
Payer: COMMERCIAL

## 2024-09-30 NOTE — TELEPHONE ENCOUNTER
[facility]    Phone Message    May a detailed message be left on voicemail: yes     Reason for Call: Other: patient calling to have a call back regarding Exercise Stress Echocardiogram results, please call to advise, thank you      Action Taken: Other: cardiology    Travel Screening: Not Applicable     Date of Service:

## 2024-10-02 DIAGNOSIS — Z01.810 PRE-OPERATIVE CARDIOVASCULAR EXAMINATION: Primary | ICD-10-CM

## 2024-10-02 NOTE — TELEPHONE ENCOUNTER
Dear Ms. Gerardo     This test was not complete because we did not get your heart rate up.  Therefore, we need to order a different test that will not have this problem, a lexiscan.     Rivera Tyler MD

## 2024-10-17 ENCOUNTER — HOSPITAL ENCOUNTER (OUTPATIENT)
Dept: CARDIOLOGY | Facility: CLINIC | Age: 74
Discharge: HOME | End: 2024-10-17
Attending: INTERNAL MEDICINE
Payer: COMMERCIAL

## 2024-10-17 ENCOUNTER — HOSPITAL ENCOUNTER (OUTPATIENT)
Dept: NUCLEAR MEDICINE | Facility: CLINIC | Age: 74
Discharge: HOME | End: 2024-10-17
Attending: INTERNAL MEDICINE
Payer: COMMERCIAL

## 2024-10-17 ENCOUNTER — TELEPHONE (OUTPATIENT)
Dept: CARDIOLOGY | Facility: CLINIC | Age: 74
End: 2024-10-17

## 2024-10-17 ENCOUNTER — ANCILLARY ORDERS (OUTPATIENT)
Dept: CARDIOLOGY | Facility: CLINIC | Age: 74
End: 2024-10-17

## 2024-10-17 VITALS — SYSTOLIC BLOOD PRESSURE: 180 MMHG | HEART RATE: 67 BPM | DIASTOLIC BLOOD PRESSURE: 75 MMHG

## 2024-10-17 DIAGNOSIS — Z01.810 PRE-OPERATIVE CARDIOVASCULAR EXAMINATION: ICD-10-CM

## 2024-10-17 DIAGNOSIS — Z01.810 PRE-OPERATIVE CARDIOVASCULAR EXAMINATION: Primary | ICD-10-CM

## 2024-10-17 LAB
CV STRESS MAX HR HE: 90
RATE PRESSURE PRODUCT: NORMAL
STRESS ECHO BASELINE DIASTOLIC HE: 75
STRESS ECHO BASELINE HR: 63 BPM
STRESS ECHO BASELINE SYSTOLIC BP: 180
STRESS ECHO CALCULATED PERCENT HR: 62 %
STRESS ECHO LAST STRESS DIASTOLIC BP: 60
STRESS ECHO LAST STRESS SYSTOLIC BP: 130
STRESS ECHO TARGET HR: 145

## 2024-10-17 PROCEDURE — 93016 CV STRESS TEST SUPVJ ONLY: CPT | Performed by: INTERNAL MEDICINE

## 2024-10-17 PROCEDURE — 250N000011 HC RX IP 250 OP 636: Performed by: INTERNAL MEDICINE

## 2024-10-17 PROCEDURE — 343N000001 HC RX 343 MED OP 636: Performed by: INTERNAL MEDICINE

## 2024-10-17 PROCEDURE — 78452 HT MUSCLE IMAGE SPECT MULT: CPT

## 2024-10-17 PROCEDURE — A9502 TC99M TETROFOSMIN: HCPCS | Performed by: INTERNAL MEDICINE

## 2024-10-17 PROCEDURE — 93018 CV STRESS TEST I&R ONLY: CPT | Performed by: INTERNAL MEDICINE

## 2024-10-17 PROCEDURE — 93017 CV STRESS TEST TRACING ONLY: CPT

## 2024-10-17 PROCEDURE — 78452 HT MUSCLE IMAGE SPECT MULT: CPT | Mod: 26 | Performed by: RADIOLOGY

## 2024-10-17 RX ORDER — LIDOCAINE 40 MG/G
CREAM TOPICAL
Status: DISCONTINUED | OUTPATIENT
Start: 2024-10-17 | End: 2024-10-18 | Stop reason: HOSPADM

## 2024-10-17 RX ORDER — CAFFEINE CITRATE 20 MG/ML
60 SOLUTION INTRAVENOUS
Status: DISCONTINUED | OUTPATIENT
Start: 2024-10-17 | End: 2024-10-17 | Stop reason: HOSPADM

## 2024-10-17 RX ORDER — AMINOPHYLLINE 25 MG/ML
50-100 INJECTION, SOLUTION INTRAVENOUS
Status: DISCONTINUED | OUTPATIENT
Start: 2024-10-17 | End: 2024-10-18 | Stop reason: HOSPADM

## 2024-10-17 RX ORDER — CAFFEINE 200 MG
200 TABLET ORAL
Status: DISCONTINUED | OUTPATIENT
Start: 2024-10-17 | End: 2024-10-17 | Stop reason: HOSPADM

## 2024-10-17 RX ORDER — ALBUTEROL SULFATE 90 UG/1
2 INHALANT RESPIRATORY (INHALATION) EVERY 5 MIN PRN
Status: DISCONTINUED | OUTPATIENT
Start: 2024-10-17 | End: 2024-10-18 | Stop reason: HOSPADM

## 2024-10-17 RX ORDER — REGADENOSON 0.08 MG/ML
0.4 INJECTION, SOLUTION INTRAVENOUS ONCE
Status: COMPLETED | OUTPATIENT
Start: 2024-10-17 | End: 2024-10-17

## 2024-10-17 RX ADMIN — REGADENOSON 0.4 MG: 0.4 INJECTION INTRAVENOUS at 11:18

## 2024-10-17 RX ADMIN — TETROFOSMIN 37.3 MILLICURIE: 1.38 INJECTION, POWDER, LYOPHILIZED, FOR SOLUTION INTRAVENOUS at 11:19

## 2024-10-17 RX ADMIN — TETROFOSMIN 10.3 MILLICURIE: 1.38 INJECTION, POWDER, LYOPHILIZED, FOR SOLUTION INTRAVENOUS at 10:09

## 2024-10-17 NOTE — TELEPHONE ENCOUNTER
[facility]    Phone Message    May a detailed message be left on voicemail: yes     Reason for Call: Requesting Results     Name/type of test: all tests taken today  Date of test: 10/17/24  Was test done at a location other than [facility] (Please fill in the location if not [facility])?: No    Mrs. Carrillo calling to get the results sooner as she is planning on flying to [facility] on Saturday and wants to be sure she will be ok to fly.  Please call her back to discuss her results.  Thank you!    Action Taken: Message routed to:  Other: Cardiology    Travel Screening: Not Applicable     Date of Service:

## 2024-11-03 ENCOUNTER — MYC MEDICAL ADVICE (OUTPATIENT)
Dept: CARDIOLOGY | Facility: CLINIC | Age: 74
End: 2024-11-03
Payer: COMMERCIAL

## 2024-11-06 DIAGNOSIS — I10 BENIGN ESSENTIAL HYPERTENSION: Primary | ICD-10-CM

## 2024-11-06 RX ORDER — LOSARTAN POTASSIUM 100 MG/1
100 TABLET ORAL DAILY
Qty: 90 TABLET | Refills: 2 | Status: SHIPPED | OUTPATIENT
Start: 2024-11-06

## 2024-11-13 ENCOUNTER — ANCILLARY PROCEDURE (OUTPATIENT)
Dept: CT IMAGING | Facility: CLINIC | Age: 74
End: 2024-11-13
Attending: INTERNAL MEDICINE
Payer: COMMERCIAL

## 2024-11-13 DIAGNOSIS — R91.8 PULMONARY NODULES: ICD-10-CM

## 2024-11-13 PROCEDURE — 71250 CT THORAX DX C-: CPT | Performed by: RADIOLOGY

## 2024-11-18 ENCOUNTER — TELEPHONE (OUTPATIENT)
Dept: CARDIOLOGY | Facility: CLINIC | Age: 74
End: 2024-11-18
Payer: COMMERCIAL

## 2024-11-18 NOTE — TELEPHONE ENCOUNTER
11/18/2024 12:20PM Josselyn Salinas  11/18 Pt had documents that needed to be signed by Dr. Tyler. Dc LAM took responsibility. GRECIA Salinas 11/18/2024 12:20PM

## 2024-11-26 ENCOUNTER — TELEPHONE (OUTPATIENT)
Dept: FAMILY MEDICINE | Facility: CLINIC | Age: 74
End: 2024-11-26

## 2024-11-26 NOTE — TELEPHONE ENCOUNTER
Call attempt 1/3.    LVM for patient to schedule annual wellness exam due February. On callback, please assist patient in scheduling Medicare AWV.    Please close encounter when scheduled.

## 2024-12-09 ENCOUNTER — TELEPHONE (OUTPATIENT)
Dept: CARDIOLOGY | Facility: CLINIC | Age: 74
End: 2024-12-09
Payer: COMMERCIAL

## 2024-12-09 NOTE — TELEPHONE ENCOUNTER
[facility] Call Center    Phone Message    May a detailed message be left on voicemail: yes     Reason for Call: Other: Ivan called requesting to speak with his wife's care team about her care and to get an update on some paperwork that Ivan states was dropped off with Dr. Tyler. Please reach out to Ivan to discuss. Thank you!     Action Taken: Other: Cardiology    Travel Screening: Not Applicable    Thank you!  [facility]       Date of Service:

## 2024-12-10 NOTE — TELEPHONE ENCOUNTER
Called Ivan and JOSE E to notify that no fax has been received on our end. Asked that he refax and call us when this is done.

## 2024-12-11 ENCOUNTER — ANCILLARY ORDERS (OUTPATIENT)
Dept: FAMILY MEDICINE | Facility: CLINIC | Age: 74
End: 2024-12-11

## 2024-12-11 DIAGNOSIS — Z12.31 VISIT FOR SCREENING MAMMOGRAM: Primary | ICD-10-CM

## 2024-12-12 NOTE — TELEPHONE ENCOUNTER
Called spouse and updated form was filled out and uploaded on [employer]    Renetta Muñoz RN on 12/12/2024 at 9:23 AM

## 2025-02-18 DIAGNOSIS — I10 BENIGN ESSENTIAL HYPERTENSION: ICD-10-CM

## 2025-02-19 NOTE — TELEPHONE ENCOUNTER
Spoke with patient per refill team as requested:       Clinic RN: Please investigate patient's chart or contact patient if the information cannot be found because  the combo medication is being requested and the medication list states plain losartan. Please verify with patient.        Refill requested was discontinued:        Patient states she meant to request renewal on Losartan due to pharmacy change. Prescription is managed by cardiology team. Routing request to team for review. Tate Padilla, [place], BSN

## 2025-02-19 NOTE — TELEPHONE ENCOUNTER
Clinic RN: Please investigate patient's chart or contact patient if the information cannot be found because  the combo medication is being requested and the medication list states plain losartan. Please verify with patient.

## 2025-02-25 ENCOUNTER — TELEPHONE (OUTPATIENT)
Dept: FAMILY MEDICINE | Facility: CLINIC | Age: 75
End: 2025-02-25
Payer: COMMERCIAL

## 2025-02-25 DIAGNOSIS — I10 BENIGN ESSENTIAL HYPERTENSION: ICD-10-CM

## 2025-02-25 RX ORDER — LOSARTAN POTASSIUM 100 MG/1
100 TABLET ORAL DAILY
Qty: 90 TABLET | Refills: 1 | Status: SHIPPED | OUTPATIENT
Start: 2025-02-25

## 2025-02-25 NOTE — TELEPHONE ENCOUNTER
Pt's  Ivan calling re: medication refill.    States they have been trying to have her Losartan 100mg refilled and sent to the [email or web address] in [place], but still has not been sent over.    Upon chart review, it appears pt was trying to request losartan potassium-hydrochlorothiazide 100-25mg through [facility], but this was a discontinued prescription. Her most recent Rx was for plain Losartan 100mg and was prescribed by her cardiologist.     Last Rx for Losartan 100mg was sent in November for #90 with 2 refills to [facility]. Should have two refills. Since refills remaining on file, sent Rx to [email or web address] as requested.    Laura León, RN, BSN  [facility] Triage

## 2025-02-26 RX ORDER — LOSARTAN POTASSIUM AND HYDROCHLOROTHIAZIDE 25; 100 MG/1; MG/1
1 TABLET ORAL EVERY MORNING
Qty: 90 TABLET | Refills: 3 | Status: SHIPPED | OUTPATIENT
Start: 2025-02-26 | End: 2025-02-26

## 2025-05-05 ENCOUNTER — OFFICE VISIT (OUTPATIENT)
Dept: FAMILY MEDICINE | Facility: CLINIC | Age: 75
End: 2025-05-05
Payer: COMMERCIAL

## 2025-05-05 VITALS
BODY MASS INDEX: 26.11 KG/M2 | DIASTOLIC BLOOD PRESSURE: 66 MMHG | HEIGHT: 55 IN | WEIGHT: 112.8 LBS | SYSTOLIC BLOOD PRESSURE: 122 MMHG | HEART RATE: 65 BPM | OXYGEN SATURATION: 98 % | TEMPERATURE: 97.5 F | RESPIRATION RATE: 16 BRPM

## 2025-05-05 DIAGNOSIS — L30.1 DYSHIDROTIC FOOT DERMATITIS: ICD-10-CM

## 2025-05-05 DIAGNOSIS — D64.9 ANEMIA, UNSPECIFIED TYPE: ICD-10-CM

## 2025-05-05 DIAGNOSIS — E78.5 HYPERLIPIDEMIA WITH TARGET LDL LESS THAN 130: ICD-10-CM

## 2025-05-05 DIAGNOSIS — Z00.00 ENCOUNTER FOR MEDICARE ANNUAL WELLNESS EXAM: Primary | ICD-10-CM

## 2025-05-05 DIAGNOSIS — I10 BENIGN ESSENTIAL HYPERTENSION: ICD-10-CM

## 2025-05-05 LAB
ALBUMIN SERPL BCG-MCNC: 4.2 G/DL (ref 3.5–5.2)
ALP SERPL-CCNC: 77 U/L (ref 40–150)
ALT SERPL W P-5'-P-CCNC: 21 U/L (ref 0–50)
ANION GAP SERPL CALCULATED.3IONS-SCNC: 10 MMOL/L (ref 7–15)
AST SERPL W P-5'-P-CCNC: 30 U/L (ref 0–45)
BILIRUB SERPL-MCNC: 0.5 MG/DL
BUN SERPL-MCNC: 28.8 MG/DL (ref 8–23)
CALCIUM SERPL-MCNC: 9.6 MG/DL (ref 8.8–10.4)
CHLORIDE SERPL-SCNC: 106 MMOL/L (ref 98–107)
CHOLEST SERPL-MCNC: 184 MG/DL
CREAT SERPL-MCNC: 1.05 MG/DL (ref 0.51–0.95)
CREAT UR-MCNC: 183 MG/DL
EGFRCR SERPLBLD CKD-EPI 2021: 55 ML/MIN/1.73M2
ERYTHROCYTE [DISTWIDTH] IN BLOOD BY AUTOMATED COUNT: 12.6 % (ref 10–15)
FASTING STATUS PATIENT QL REPORTED: YES
FASTING STATUS PATIENT QL REPORTED: YES
GLUCOSE SERPL-MCNC: 102 MG/DL (ref 70–99)
HCO3 SERPL-SCNC: 25 MMOL/L (ref 22–29)
HCT VFR BLD AUTO: 35.4 % (ref 35–47)
HDLC SERPL-MCNC: 76 MG/DL
HGB BLD-MCNC: 11.5 G/DL (ref 11.7–15.7)
LDLC SERPL CALC-MCNC: 98 MG/DL
MCH RBC QN AUTO: 29.6 PG (ref 26.5–33)
MCHC RBC AUTO-ENTMCNC: 32.5 G/DL (ref 31.5–36.5)
MCV RBC AUTO: 91 FL (ref 78–100)
MICROALBUMIN UR-MCNC: 14.4 MG/L
MICROALBUMIN/CREAT UR: 7.87 MG/G CR (ref 0–25)
NONHDLC SERPL-MCNC: 108 MG/DL
PLATELET # BLD AUTO: 200 10E3/UL (ref 150–450)
POTASSIUM SERPL-SCNC: 4.2 MMOL/L (ref 3.4–5.3)
PROT SERPL-MCNC: 7.2 G/DL (ref 6.4–8.3)
RBC # BLD AUTO: 3.88 10E6/UL (ref 3.8–5.2)
SODIUM SERPL-SCNC: 141 MMOL/L (ref 135–145)
TRIGL SERPL-MCNC: 52 MG/DL
WBC # BLD AUTO: 4.9 10E3/UL (ref 4–11)

## 2025-05-05 PROCEDURE — 82570 ASSAY OF URINE CREATININE: CPT | Performed by: FAMILY MEDICINE

## 2025-05-05 PROCEDURE — 80053 COMPREHEN METABOLIC PANEL: CPT | Performed by: FAMILY MEDICINE

## 2025-05-05 PROCEDURE — G2211 COMPLEX E/M VISIT ADD ON: HCPCS | Performed by: FAMILY MEDICINE

## 2025-05-05 PROCEDURE — 36415 COLL VENOUS BLD VENIPUNCTURE: CPT | Performed by: FAMILY MEDICINE

## 2025-05-05 PROCEDURE — 85027 COMPLETE CBC AUTOMATED: CPT | Performed by: FAMILY MEDICINE

## 2025-05-05 PROCEDURE — 1126F AMNT PAIN NOTED NONE PRSNT: CPT | Performed by: FAMILY MEDICINE

## 2025-05-05 PROCEDURE — G0439 PPPS, SUBSEQ VISIT: HCPCS | Performed by: FAMILY MEDICINE

## 2025-05-05 PROCEDURE — 83550 IRON BINDING TEST: CPT | Performed by: FAMILY MEDICINE

## 2025-05-05 PROCEDURE — 82728 ASSAY OF FERRITIN: CPT | Performed by: FAMILY MEDICINE

## 2025-05-05 PROCEDURE — 99214 OFFICE O/P EST MOD 30 MIN: CPT | Mod: 25 | Performed by: FAMILY MEDICINE

## 2025-05-05 PROCEDURE — 82043 UR ALBUMIN QUANTITATIVE: CPT | Performed by: FAMILY MEDICINE

## 2025-05-05 PROCEDURE — 3078F DIAST BP <80 MM HG: CPT | Performed by: FAMILY MEDICINE

## 2025-05-05 PROCEDURE — 3074F SYST BP LT 130 MM HG: CPT | Performed by: FAMILY MEDICINE

## 2025-05-05 PROCEDURE — 83540 ASSAY OF IRON: CPT | Performed by: FAMILY MEDICINE

## 2025-05-05 PROCEDURE — 80061 LIPID PANEL: CPT | Performed by: FAMILY MEDICINE

## 2025-05-05 RX ORDER — TRIAMCINOLONE ACETONIDE 1 MG/G
CREAM TOPICAL 2 TIMES DAILY
Qty: 30 G | Refills: 0 | Status: SHIPPED | OUTPATIENT
Start: 2025-05-05

## 2025-05-05 RX ORDER — LOSARTAN POTASSIUM AND HYDROCHLOROTHIAZIDE 25; 100 MG/1; MG/1
1 TABLET ORAL DAILY
COMMUNITY
Start: 2025-02-26 | End: 2025-05-05 | Stop reason: ALTCHOICE

## 2025-05-05 ASSESSMENT — PAIN SCALES - GENERAL: PAINLEVEL_OUTOF10: NO PAIN (0)

## 2025-05-05 ASSESSMENT — SOCIAL DETERMINANTS OF HEALTH (SDOH): HOW OFTEN DO YOU GET TOGETHER WITH FRIENDS OR RELATIVES?: THREE TIMES A WEEK

## 2025-05-05 NOTE — PROGRESS NOTES
"Preventive Care Visit  [facility]  Sylvie Medrano MD, Family Medicine  May 5, 2025      Assessment & Plan     Encounter for Medicare annual wellness exam  Screening and preventive care discussed.  I did give her a list of immunizations that she is a candidate for and she will consider updating those at the pharmacy.    Benign essential hypertension  Blood pressure is under good control with current treatment.  Will update a prescription for losartan when results return unless adjustment is needed based on these.  - COMPREHENSIVE METABOLIC PANEL; Future  - Albumin Random Urine Quantitative with Creat Ratio; Future  - COMPREHENSIVE METABOLIC PANEL  - Albumin Random Urine Quantitative with Creat Ratio    Hyperlipidemia with target LDL less than 130  Reassess lipid control.  Tolerating atorvastatin well and updated prescription with any adjustments can be sent when results return.  - Lipid panel reflex to direct LDL Fasting; Future  - Lipid panel reflex to direct LDL Fasting    Anemia, unspecified type  Past history of anemia.  She does follow a vegan diet.  Reassess hemoglobin today.  - CBC with Platelets; Future  - CBC with Platelets    Dyshidrotic foot dermatitis  Topical use of triamcinolone cream to the toes on the right foot recommended.  She will follow-up if this fails to resolve symptoms.  - triamcinolone (KENALOG) 0.1 % external cream; Apply topically 2 times daily.    Patient has been advised of split billing requirements and indicates understanding: Yes        BMI  Estimated body mass index is 26.22 kg/m  as calculated from the following:    Height as of this encounter: 1.397 m (4' 7\").    Weight as of this encounter: 51.2 kg (112 lb 12.8 oz).       Counseling  Appropriate preventive services were addressed with this patient via screening, questionnaire, or discussion as appropriate for fall prevention, nutrition, physical activity, Tobacco-use cessation, social engagement, weight " loss and cognition.  Checklist reviewing preventive services available has been given to the patient.  Reviewed patient's diet, addressing concerns and/or questions.   Discussed possible causes of fatigue.     Patient Instructions   Labs today.  I will update refills when results return.    Immunizations to consider getting at the pharmacy:   Tetanus booster, RSV vaccine, COVID vaccine (if it has been more than 6 months since last one)    Begin triamcinolone cream to the toes on the right foot twice a day until clear then as needed if recurrent scaling/cracking skin.            The longitudinal plan of care for the diagnosis(es)/condition(s) as documented were addressed during this visit. Due to the added complexity in care, I will continue to support Bree in the subsequent management and with ongoing continuity of care.        Follow-up    Follow-up Visit   Expected date:  May 12, 2026 (Approximate)      Follow Up Appointment Details:     Follow-up with whom?: PCP    Follow-Up for what?: Medicare Wellness    Welcome or Annual?: Annual Wellness    How?: In Person                 Taiwo Giron is a 74 year old, presenting for the following:  Wellness Visit        5/5/2025     6:51 AM   Additional Questions   Roomed by Nick   Accompanied by Self         5/5/2025     6:51 AM   Patient Reported Additional Medications   Patient reports taking the following new medications None         HPI       Hyperlipidemia Follow-Up    Are you regularly taking any medication or supplement to lower your cholesterol?   Yes- atorvastatin  Are you having muscle aches or other side effects that you think could be caused by your cholesterol lowering medication?  No    Hypertension Follow-up    Do you check your blood pressure regularly outside of the clinic? Yes   Are you following a low salt diet? Yes  Are your blood pressures ever more than 140 on the top number (systolic) OR more   than 90 on the bottom number (diastolic), for  example 140/90? No    BP Readings from Last 2 Encounters:   05/05/25 122/66   10/17/24 (!) 180/75       Advance Care Planning  3  Discussed advance care planning with patient; however, patient declined at this time.        5/5/2025   General Health   How would you rate your overall physical health? Good         5/5/2025   Nutrition   Diet: Vegetarian/vegan         2/15/2024   Exercise   Days per week of moderate/strenous exercise 3 days   Treadmill, walking  - tolerated well.        5/5/2025   Social Factors   Frequency of gathering with friends or relatives Three times a week         2/15/2024   Activities of Daily Living- Home Safety   Needs help with the following daily activites None of the above   Safety concerns in the home None of the above         2/15/2024   Dental   Dentist two times every year? Yes         2/15/2024   Hearing Screening   Hearing concerns? None of the above           2/15/2024   Urinary Incontinence Screening   Bothered by leaking urine in past 6 months No           Today's PHQ-2 Score:       5/5/2025     6:59 AM   PHQ-2 ( 1999 [employer])   Q1: Little interest or pleasure in doing things 0   Q2: Feeling down, depressed or hopeless 0   PHQ-2 Score 0    Q1: Little interest or pleasure in doing things Not at all   Q2: Feeling down, depressed or hopeless Not at all   PHQ-2 Score 0       Patient-reported         2/15/2024   Substance Use   Alcohol more than 3/day or more than 7/wk No   Do you have a current opioid prescription? No   How severe/bad is pain from 1 to 10? 0/10 (No Pain)   Do you use any other substances recreationally? No     Social History     Tobacco Use    Smoking status: Never    Smokeless tobacco: Never   Vaping Use    Vaping status: Never Used   Substance Use Topics    Alcohol use: No    Drug use: No           2/16/2024   LAST FHS-7 RESULTS   1st degree relative breast or ovarian cancer No   Any relative bilateral breast cancer No   Any male have breast cancer No   Any ONE  woman have BOTH breast AND ovarian cancer No   Any woman with breast cancer before 50yrs No   2 or more relatives with breast AND/OR ovarian cancer No   2 or more relatives with breast AND/OR bowel cancer No        Mammogram Screening - After age 74- determine frequency with patient based on health status, life expectancy and patient goals    ASCVD Risk   The 10-year ASCVD risk score (Yung SCHRADER, et al., 2019) is: 19.2%    Values used to calculate the score:      Age: 74 years      Sex: Female      Is Non- : No      Diabetic: No      Tobacco smoker: No      Systolic Blood Pressure: 122 mmHg      Is BP treated: Yes      HDL Cholesterol: 78 mg/dL      Total Cholesterol: 173 mg/dL          Reviewed and updated as needed this visit by Provider   Tobacco  Allergies  Meds   Med Hx  Surg Hx  Fam Hx            Past Medical History:   Diagnosis Date    Hyperlipidemia     Hypertension      Past Surgical History:   Procedure Laterality Date    CATARACT EXTRACTION, BILATERAL      LASIK BILATERAL Bilateral 2012    Dr Chavez     OB History   No obstetric history on file.     BP Readings from Last 3 Encounters:   05/05/25 122/66   10/17/24 (!) 180/75   09/24/24 (!) 177/85    Wt Readings from Last 3 Encounters:   05/05/25 51.2 kg (112 lb 12.8 oz)   09/24/24 51.5 kg (113 lb 9.6 oz)   09/11/24 50.5 kg (111 lb 4.8 oz)                  Current providers sharing in care for this patient include:  Patient Care Team:  Sylvie Medrano MD as PCP - General (Family Medicine)  Deanna Lundberg MD as Assigned Endocrinology Provider  Mark Desai Chi, OD as MD (Optometry)  Sylvie Medrano MD as Assigned PCP  Rivera Tyler MD as MD (Cardiovascular Disease)  Rivera Tyler MD as Assigned Heart and Vascular Provider    The following health maintenance items are reviewed in Epic and correct as of today:  Health Maintenance   Topic Date Due    DTAP/TDAP/TD IMMUNIZATION (2 - Td or Tdap) 08/29/2022    RSV  "VACCINE (1 - 1-dose 75+ series) Never done    EYE EXAM  08/03/2024    COVID-19 Vaccine (6 - 2024-25 season) 09/01/2024    COLORECTAL CANCER SCREENING  09/19/2024    ANNUAL REVIEW OF HM ORDERS  12/04/2024    MEDICARE ANNUAL WELLNESS VISIT  02/15/2025    BMP  02/15/2025    CMP  02/15/2025    LIPID  02/15/2025    MICROALBUMIN  02/15/2025    CBC  02/15/2025    MAMMO SCREENING  02/16/2025    INFLUENZA VACCINE (Season Ended) 09/01/2025    FALL RISK ASSESSMENT  05/05/2026    DIABETES SCREENING  02/15/2027    ADVANCE CARE PLANNING  02/15/2029    DEXA  08/07/2039    HEPATITIS C SCREENING  Completed    PHQ-2 (once per calendar year)  Completed    Pneumococcal Vaccine: 50+ Years  Completed    ZOSTER IMMUNIZATION  Completed    HPV IMMUNIZATION  Aged Out    MENINGITIS IMMUNIZATION  Aged Out         Review of Systems  Constitutional, HEENT, cardiovascular, pulmonary, GI, , musculoskeletal, neuro, skin, endocrine and psych systems are negative, except as otherwise noted.   Skin toes: Right foot with scaling and cracking of skin on the plantar surface.  No significant itching or pain.  Will bleed at times.    Objective    Exam  /66 (BP Location: Right arm, Patient Position: Sitting, Cuff Size: Adult Regular)   Pulse 65   Temp 97.5  F (36.4  C) (Tympanic)   Resp 16   Ht 1.397 m (4' 7\")   Wt 51.2 kg (112 lb 12.8 oz)   SpO2 98%   BMI 26.22 kg/m     Estimated body mass index is 26.22 kg/m  as calculated from the following:    Height as of this encounter: 1.397 m (4' 7\").    Weight as of this encounter: 51.2 kg (112 lb 12.8 oz).    Physical Exam  GENERAL: alert and no distress  EYES: Eyes grossly normal to inspection, PERRL and conjunctivae and sclerae normal  HENT: ear canals and TM's normal, nose and mouth without ulcers or lesions  NECK: no adenopathy, no asymmetry, masses, or scars  RESP: lungs clear to auscultation - no rales, rhonchi or wheezes  BREAST: normal without masses, tenderness or nipple discharge and no " palpable axillary masses or adenopathy  CV: regular rate and rhythm, normal S1 S2, no S3 or S4, no murmur, click or rub, no peripheral edema  ABDOMEN: soft, nontender, no hepatosplenomegaly, no masses and bowel sounds normal  MS: no gross musculoskeletal defects noted, no edema  SKIN: no suspicious lesions or rashes and right foot plantar surface toes 2nd and 4th with scaling noted.  Area of denuded skin fourth toe with scant bleeding present.  No ulceration or purulent drainage noted.  NEURO: Normal strength and tone, mentation intact and speech normal  PSYCH: mentation appears normal, affect normal/bright         5/5/2025   Mini Cog   Clock Draw Score 0 Abnormal   3 Item Recall 2 objects recalled   Mini Cog Total Score 2          Six Item Cognitive Impairment Test   (6CIT):    What year is it?                               Correct - 0 points  What month is it?                               Correct - 0 points    Give the patient an address to remember with five components:   Ovidio Corley ( first and last name - 2 components)   [address]  (number and name of street - 2 components)   [place] ( city - 1 component)    About what time is it (within the hour)? Correct - 0 points  Count backwards from 20 to 1:   Correct - 0 points  Say the months of the year in reverse: More than one error - 4 points  Repeat the address phrase:   1 error - 2 points    Total 6CIT Score:      6/28    Interpretation: The 6CIT uses an inverse score and questions are weighted to produce a total out of 28. Scores of 0-7 are considered normal and 8 or more significant.    Advantages The test has high sensitivity without compromising specificity even in mild dementia. It is easy to translate linguistically and culturally.  Disadvantages The main disadvantage is in the scoring and weighting of the test, which is initially confusing, however computer models have simplified this greatly.    Probability Statistics: At the 7/8 cut off: Overall  figures sensitivity 90% specificity 100%, in mild dementia sensitivity = 78% , specificity = 100%    Copyright 2000 [facility], [facility], . Courtesy of Dr. Beto De La Rosa      Signed Electronically by: Sylvie Medrano MD          This chart was documented by provider using a voice activated software called Dragon in addition to manual typing. There may be vocabulary errors or other grammatical errors due to this.

## 2025-05-05 NOTE — PATIENT INSTRUCTIONS
Labs today.  I will update refills when results return.    Immunizations to consider getting at the pharmacy:   Tetanus booster, RSV vaccine, COVID vaccine (if it has been more than 6 months since last one)    Begin triamcinolone cream to the toes on the right foot twice a day until clear then as needed if recurrent scaling/cracking skin.        Patient Education   Preventive Care Advice   This is general advice given by our system to help you stay healthy. However, your care team may have specific advice just for you. Please talk to your care team about your preventive care needs.  Nutrition  Eat 5 or more servings of fruits and vegetables each day.  Try wheat bread, brown rice and whole grain pasta (instead of white bread, rice, and pasta).  Get enough calcium and vitamin D. Check the label on foods and aim for 100% of the RDA (recommended daily allowance).  Lifestyle  Exercise at least 150 minutes each week  (30 minutes a day, 5 days a week).  Do muscle strengthening activities 2 days a week. These help control your weight and prevent disease.  No smoking.  Wear sunscreen to prevent skin cancer.  Have a dental exam and cleaning every 6 months.  Yearly exams  See your health care team every year to talk about:  Any changes in your health.  Any medicines your care team has prescribed.  Preventive care, family planning, and ways to prevent chronic diseases.  Shots (vaccines)   HPV shots (up to age 26), if you've never had them before.  Hepatitis B shots (up to age 59), if you've never had them before.  COVID-19 shot: Get this shot when it's due.  Flu shot: Get a flu shot every year.  Tetanus shot: Get a tetanus shot every 10 years.  Pneumococcal, hepatitis A, and RSV shots: Ask your care team if you need these based on your risk.  Shingles shot (for age 50 and up)  General health tests  Diabetes screening:  Starting at age 35, Get screened for diabetes at least every 3 years.  If you are younger than age 35, ask  your care team if you should be screened for diabetes.  Cholesterol test: At age 39, start having a cholesterol test every 5 years, or more often if advised.  Bone density scan (DEXA): At age 50, ask your care team if you should have this scan for osteoporosis (brittle bones).  Hepatitis C: Get tested at least once in your life.  STIs (sexually transmitted infections)  Before age 24: Ask your care team if you should be screened for STIs.  After age 24: Get screened for STIs if you're at risk. You are at risk for STIs (including HIV) if:  You are sexually active with more than one person.  You don't use condoms every time.  You or a partner was diagnosed with a sexually transmitted infection.  If you are at risk for HIV, ask about PrEP medicine to prevent HIV.  Get tested for HIV at least once in your life, whether you are at risk for HIV or not.  Cancer screening tests  Cervical cancer screening: If you have a cervix, begin getting regular cervical cancer screening tests starting at age 21.  Breast cancer scan (mammogram): If you've ever had breasts, begin having regular mammograms starting at age 40. This is a scan to check for breast cancer.  Colon cancer screening: It is important to start screening for colon cancer at age 45.  Have a colonoscopy test every 10 years (or more often if you're at risk) Or, ask your provider about stool tests like a FIT test every year or Cologuard test every 3 years.  To learn more about your testing options, visit:   .  For help making a decision, visit:   https://bit.ly/mi62807.  Prostate cancer screening test: If you have a prostate, ask your care team if a prostate cancer screening test (PSA) at age 55 is right for you.  Lung cancer screening: If you are a current or former smoker ages 50 to 80, ask your care team if ongoing lung cancer screenings are right for you.  For informational purposes only. Not to replace the advice of your health care provider. Copyright   2023  [facility]. All rights reserved. Clinically reviewed by the [facility]. [employer] 294327 - REV 01/24.  Preventing Falls: Care Instructions  Injuries and health problems such as trouble walking or poor eyesight can increase your risk of falling. So can some medicines. But there are things you can do to help prevent falls. You can exercise to get stronger. You can also arrange your home to make it safer.    Talk to your doctor about the medicines you take. Ask if any of them increase the risk of falls and whether they can be changed or stopped.   Try to exercise regularly. It can help improve your strength and balance. This can help lower your risk of falling.         Practice fall safety and prevention.   Wear low-heeled shoes that fit well and give your feet good support. Talk to your doctor if you have foot problems that make this hard.  Carry a cellphone or wear a medical alert device that you can use to call for help.  Use stepladders instead of chairs to reach high objects. Don't climb if you're at risk for falls. Ask for help, if needed.  Wear the correct eyeglasses, if you need them.        Make your home safer.   Remove rugs, cords, clutter, and furniture from walkways.  Keep your house well lit. Use night-lights in hallways and bathrooms.  Install and use sturdy handrails on stairways.  Wear nonskid footwear, even inside. Don't walk barefoot or in socks without shoes.        Be safe outside.   Use handrails, curb cuts, and ramps whenever possible.  Keep your hands free by using a shoulder bag or backpack.  Try to walk in well-lit areas. Watch out for uneven ground, changes in pavement, and debris.  Be careful in the winter. Walk on the grass or gravel when sidewalks are slippery. Use de-icer on steps and walkways. Add non-slip devices to shoes.    Put grab bars and nonskid mats in your shower or tub and near the toilet. Try to use a shower chair or bath bench when  "bathing.   Get into a tub or shower by putting in your weaker leg first. Get out with your strong side first. Have a phone or medical alert device in the bathroom with you.   Where can you learn more?  Go to https://www.emploi.us.net/patiented  Enter G117 in the search box to learn more about \"Preventing Falls: Care Instructions.\"  Current as of: July 31, 2024  Content Version: 14.4    5716-8083 [employer].   Care instructions adapted under license by your healthcare professional. If you have questions about a medical condition or this instruction, always ask your healthcare professional. [employer] disclaims any warranty or liability for your use of this information.    Learning About Sleeping Well  What does sleeping well mean?     Sleeping well means getting enough sleep to feel good and stay healthy. How much sleep is enough varies among people.  The number of hours you sleep and how you feel when you wake up are both important. If you do not feel refreshed, you probably need more sleep. Another sign of not getting enough sleep is feeling tired during the day.  Experts recommend that adults get at least 7 or more hours of sleep per day. Children and older adults need more sleep.  Why is getting enough sleep important?  Getting enough quality sleep is a basic part of good health. When your sleep suffers, your physical health, mood, and your thoughts can suffer too. You may find yourself feeling more grumpy or stressed. Not getting enough sleep also can lead to serious problems, including injury, accidents, anxiety, and depression.  What might cause poor sleeping?  Many things can cause sleep problems, including:  Changes to your sleep schedule.  Stress. Stress can be caused by fear about a single event, such as giving a speech. Or you may have ongoing stress, such as worry about work or school.  Depression, anxiety, and other mental or emotional conditions.  Changes in your sleep habits " "or surroundings. This includes changes that happen where you sleep, such as noise, light, or sleeping in a different bed. It also includes changes in your sleep pattern, such as having jet lag or working a late shift.  Health problems, such as pain, breathing problems, and restless legs syndrome.  Lack of regular exercise.  Using alcohol, nicotine, or caffeine before bed.  How can you help yourself?  Here are some tips that may help you sleep more soundly and wake up feeling more refreshed.  Your sleeping area   Use your bedroom only for sleeping and sex. A bit of light reading may help you fall asleep. But if it doesn't, do your reading elsewhere in the house. Try not to use your TV, computer, smartphone, or tablet while you are in bed.  Be sure your bed is big enough to stretch out comfortably, especially if you have a sleep partner.  Keep your bedroom quiet, dark, and cool. Use curtains, blinds, or a sleep mask to block out light. To block out noise, use earplugs, soothing music, or a \"white noise\" machine.  Your evening and bedtime routine   Create a relaxing bedtime routine. You might want to take a warm shower or bath, or listen to soothing music.  Go to bed at the same time every night. And get up at the same time every morning, even if you feel tired.  What to avoid   Limit caffeine (coffee, tea, caffeinated sodas) during the day, and don't have any for at least 6 hours before bedtime.  Avoid drinking alcohol before bedtime. Alcohol can cause you to wake up more often during the night.  Try not to smoke or use tobacco, especially in the evening. Nicotine can keep you awake.  Limit naps during the day, especially close to bedtime.  Avoid lying in bed awake for too long. If you can't fall asleep or if you wake up in the middle of the night and can't get back to sleep within about 20 minutes, get out of bed and go to another room until you feel sleepy.  Avoid taking medicine right before bed that may keep you " "awake or make you feel hyper or energized. Your doctor can tell you if your medicine may do this and if you can take it earlier in the day.  If you can't sleep   Imagine yourself in a peaceful, pleasant scene. Focus on the details and feelings of being in a place that is relaxing.  Get up and do a quiet or boring activity until you feel sleepy.  Avoid drinking any liquids before going to bed to help prevent waking up often to use the bathroom.  Where can you learn more?  Go to https://www.[employer].net/patiented  Enter J942 in the search box to learn more about \"Learning About Sleeping Well.\"  Current as of: July 31, 2024  Content Version: 14.4    6826-1391 [employer].   Care instructions adapted under license by your healthcare professional. If you have questions about a medical condition or this instruction, always ask your healthcare professional. [employer] disclaims any warranty or liability for your use of this information.       "

## 2025-05-07 ENCOUNTER — RESULTS FOLLOW-UP (OUTPATIENT)
Dept: FAMILY MEDICINE | Facility: CLINIC | Age: 75
End: 2025-05-07

## 2025-05-07 DIAGNOSIS — D64.9 ANEMIA, UNSPECIFIED TYPE: Primary | ICD-10-CM

## 2025-05-07 DIAGNOSIS — R79.89 ELEVATED SERUM CREATININE: ICD-10-CM

## 2025-05-07 LAB
FERRITIN SERPL-MCNC: 60 NG/ML (ref 11–328)
IRON BINDING CAPACITY (ROCHE): 285 UG/DL (ref 240–430)
IRON SATN MFR SERPL: 34 % (ref 15–46)
IRON SERPL-MCNC: 96 UG/DL (ref 37–145)

## 2025-05-07 NOTE — RESULT ENCOUNTER NOTE
"Please call patient if message not read re: recheck lab for kidney function in 1 to 2 weeks  Prediabetes on current testing -see recommendation      Your kidney function is slightly decreased compared with previous.  This could be related to being fasting at the time of the visit if an adequate water intake occurred prior to the lab draw.  I would recommend a recheck of nonfasting labs in 1 to 2 weeks.  I will have an order available for this and you can schedule a lab appointment.  Your blood sugar is borderline elevated.  This is in the \"prediabetic\" range.  Exercise and limiting carbohydrate and sugars in diet can be helpful at preventing progression to diabetes.   Liver testing is normal.  Your blood cell counts show a borderline low hemoglobin.  I have sent an order to add to the blood in lab to assess your iron stores.  I will forward that result when available.  Urine testing is normal.  There is no elevated protein present.  Cholesterol very good control with current treatment.  Continue current atorvastatin as recommended.  Please call or MyChart message me if you have any questions.  PSK  "

## 2025-05-11 ENCOUNTER — HEALTH MAINTENANCE LETTER (OUTPATIENT)
Age: 75
End: 2025-05-11

## 2025-05-15 ENCOUNTER — TELEPHONE (OUTPATIENT)
Dept: FAMILY MEDICINE | Facility: CLINIC | Age: 75
End: 2025-05-15
Payer: COMMERCIAL

## 2025-05-15 DIAGNOSIS — E78.5 HYPERLIPIDEMIA LDL GOAL <100: ICD-10-CM

## 2025-05-15 DIAGNOSIS — I10 BENIGN ESSENTIAL HYPERTENSION: ICD-10-CM

## 2025-05-15 DIAGNOSIS — Z12.31 VISIT FOR SCREENING MAMMOGRAM: Primary | ICD-10-CM

## 2025-05-15 NOTE — TELEPHONE ENCOUNTER
Pt requesting to have an annual screening mammogram, and requesting 3D. Pt DENIES any sx.  Pt requesting refill of losartan and atorvastatin. Pt states [facility] denies having any refills remaining.    Routing refill request to provider for review/approval because:  Losartan was last prescribed by a different provider (Dr. Tyler).    Pt requests call back when orders have been signed.    Fatou Ralph, KEVYNN, RN

## 2025-05-17 RX ORDER — LOSARTAN POTASSIUM 100 MG/1
100 TABLET ORAL DAILY
Qty: 90 TABLET | Refills: 1 | Status: SHIPPED | OUTPATIENT
Start: 2025-05-17

## 2025-05-17 RX ORDER — ATORVASTATIN CALCIUM 20 MG/1
20 TABLET, FILM COATED ORAL EVERY EVENING
Qty: 90 TABLET | Refills: 3 | Status: SHIPPED | OUTPATIENT
Start: 2025-05-17

## 2025-06-18 ENCOUNTER — ANCILLARY PROCEDURE (OUTPATIENT)
Dept: MAMMOGRAPHY | Facility: CLINIC | Age: 75
End: 2025-06-18
Attending: FAMILY MEDICINE
Payer: COMMERCIAL

## 2025-06-18 DIAGNOSIS — Z12.31 VISIT FOR SCREENING MAMMOGRAM: ICD-10-CM

## 2025-06-18 PROCEDURE — 77067 SCR MAMMO BI INCL CAD: CPT | Performed by: STUDENT IN AN ORGANIZED HEALTH CARE EDUCATION/TRAINING PROGRAM

## 2025-06-18 PROCEDURE — 77063 BREAST TOMOSYNTHESIS BI: CPT | Performed by: STUDENT IN AN ORGANIZED HEALTH CARE EDUCATION/TRAINING PROGRAM

## (undated) RX ORDER — REGADENOSON 0.08 MG/ML
INJECTION, SOLUTION INTRAVENOUS
Status: DISPENSED
Start: 2024-10-17